# Patient Record
Sex: FEMALE | Race: WHITE | Employment: PART TIME | URBAN - METROPOLITAN AREA
[De-identification: names, ages, dates, MRNs, and addresses within clinical notes are randomized per-mention and may not be internally consistent; named-entity substitution may affect disease eponyms.]

---

## 2017-02-20 ENCOUNTER — OFFICE VISIT (OUTPATIENT)
Dept: ENT CLINIC | Age: 57
End: 2017-02-20

## 2017-02-20 VITALS
SYSTOLIC BLOOD PRESSURE: 134 MMHG | DIASTOLIC BLOOD PRESSURE: 77 MMHG | BODY MASS INDEX: 24.8 KG/M2 | HEART RATE: 140 BPM | WEIGHT: 158 LBS | HEIGHT: 67 IN

## 2017-02-20 DIAGNOSIS — S09.93XA FACIAL TRAUMA, INITIAL ENCOUNTER: Primary | ICD-10-CM

## 2017-02-20 PROCEDURE — 99213 OFFICE O/P EST LOW 20 MIN: CPT | Performed by: OTOLARYNGOLOGY

## 2017-02-21 ENCOUNTER — OFFICE VISIT (OUTPATIENT)
Dept: FAMILY MEDICINE CLINIC | Age: 57
End: 2017-02-21

## 2017-02-21 ENCOUNTER — TELEPHONE (OUTPATIENT)
Dept: FAMILY MEDICINE CLINIC | Age: 57
End: 2017-02-21

## 2017-02-21 VITALS
HEART RATE: 94 BPM | BODY MASS INDEX: 23.23 KG/M2 | RESPIRATION RATE: 13 BRPM | DIASTOLIC BLOOD PRESSURE: 82 MMHG | HEIGHT: 67 IN | WEIGHT: 148 LBS | OXYGEN SATURATION: 96 % | SYSTOLIC BLOOD PRESSURE: 138 MMHG

## 2017-02-21 DIAGNOSIS — S09.93XA FACIAL TRAUMA, INITIAL ENCOUNTER: ICD-10-CM

## 2017-02-21 DIAGNOSIS — F10.10 ETOH ABUSE: ICD-10-CM

## 2017-02-21 DIAGNOSIS — E87.1 HYPONATREMIA: Primary | ICD-10-CM

## 2017-02-21 PROCEDURE — 99204 OFFICE O/P NEW MOD 45 MIN: CPT | Performed by: CLINICAL NURSE SPECIALIST

## 2017-02-21 RX ORDER — LORAZEPAM 1 MG/1
1 TABLET ORAL 2 TIMES DAILY PRN
Qty: 5 TABLET | Refills: 0 | Status: ON HOLD | OUTPATIENT
Start: 2017-02-21 | End: 2017-02-24 | Stop reason: HOSPADM

## 2017-02-21 ASSESSMENT — ENCOUNTER SYMPTOMS
SORE THROAT: 0
NAUSEA: 0
CHEST TIGHTNESS: 0
VOMITING: 0
WHEEZING: 0
ABDOMINAL PAIN: 0
SHORTNESS OF BREATH: 0
CONSTIPATION: 0
COUGH: 1
VISUAL CHANGE: 0
SINUS PAIN: 0

## 2017-02-22 PROBLEM — F10.931 ALCOHOL WITHDRAWAL DELIRIUM (HCC): Status: ACTIVE | Noted: 2017-02-22

## 2017-02-22 PROBLEM — S02.92XA FACIAL BONES, CLOSED FRACTURE (HCC): Status: ACTIVE | Noted: 2017-02-22

## 2017-02-24 ASSESSMENT — ENCOUNTER SYMPTOMS: RHINORRHEA: 1

## 2017-02-28 ENCOUNTER — OFFICE VISIT (OUTPATIENT)
Dept: FAMILY MEDICINE CLINIC | Age: 57
End: 2017-02-28

## 2017-02-28 VITALS
OXYGEN SATURATION: 98 % | SYSTOLIC BLOOD PRESSURE: 158 MMHG | HEIGHT: 67 IN | RESPIRATION RATE: 20 BRPM | WEIGHT: 152 LBS | DIASTOLIC BLOOD PRESSURE: 82 MMHG | BODY MASS INDEX: 23.86 KG/M2 | HEART RATE: 132 BPM | TEMPERATURE: 98.2 F

## 2017-02-28 DIAGNOSIS — F10.10 ETOH ABUSE: ICD-10-CM

## 2017-02-28 DIAGNOSIS — E78.49 OTHER HYPERLIPIDEMIA: ICD-10-CM

## 2017-02-28 DIAGNOSIS — N39.0 URINARY TRACT INFECTION WITH HEMATURIA, SITE UNSPECIFIED: ICD-10-CM

## 2017-02-28 DIAGNOSIS — R31.9 URINARY TRACT INFECTION WITH HEMATURIA, SITE UNSPECIFIED: ICD-10-CM

## 2017-02-28 DIAGNOSIS — M54.12 CERVICAL RADICULOPATHY: ICD-10-CM

## 2017-02-28 DIAGNOSIS — F41.9 ANXIETY: ICD-10-CM

## 2017-02-28 DIAGNOSIS — I10 ESSENTIAL HYPERTENSION: Primary | ICD-10-CM

## 2017-02-28 PROCEDURE — 99214 OFFICE O/P EST MOD 30 MIN: CPT | Performed by: CLINICAL NURSE SPECIALIST

## 2017-02-28 RX ORDER — MELOXICAM 7.5 MG/1
7.5 TABLET ORAL DAILY
Qty: 30 TABLET | Refills: 0 | Status: SHIPPED | OUTPATIENT
Start: 2017-02-28 | End: 2017-04-19 | Stop reason: ALTCHOICE

## 2017-02-28 RX ORDER — ESCITALOPRAM OXALATE 10 MG/1
10 TABLET ORAL DAILY
Qty: 30 TABLET | Refills: 0 | Status: SHIPPED | OUTPATIENT
Start: 2017-02-28 | End: 2017-03-13 | Stop reason: SDUPTHER

## 2017-02-28 ASSESSMENT — ENCOUNTER SYMPTOMS
VOMITING: 0
SHORTNESS OF BREATH: 0
CHEST TIGHTNESS: 0
NAUSEA: 0
COUGH: 0
ABDOMINAL PAIN: 0
DIARRHEA: 0

## 2017-03-06 ENCOUNTER — TELEPHONE (OUTPATIENT)
Dept: FAMILY MEDICINE CLINIC | Age: 57
End: 2017-03-06

## 2017-03-07 ENCOUNTER — TELEPHONE (OUTPATIENT)
Dept: FAMILY MEDICINE CLINIC | Age: 57
End: 2017-03-07

## 2017-03-08 ENCOUNTER — TELEPHONE (OUTPATIENT)
Dept: FAMILY MEDICINE CLINIC | Age: 57
End: 2017-03-08

## 2017-03-13 ENCOUNTER — OFFICE VISIT (OUTPATIENT)
Dept: FAMILY MEDICINE CLINIC | Age: 57
End: 2017-03-13

## 2017-03-13 VITALS
DIASTOLIC BLOOD PRESSURE: 72 MMHG | TEMPERATURE: 97.8 F | OXYGEN SATURATION: 96 % | RESPIRATION RATE: 12 BRPM | HEIGHT: 67 IN | WEIGHT: 149.2 LBS | BODY MASS INDEX: 23.42 KG/M2 | SYSTOLIC BLOOD PRESSURE: 136 MMHG | HEART RATE: 90 BPM

## 2017-03-13 DIAGNOSIS — Z72.0 TOBACCO USE: ICD-10-CM

## 2017-03-13 DIAGNOSIS — I10 ESSENTIAL HYPERTENSION: Primary | ICD-10-CM

## 2017-03-13 DIAGNOSIS — F10.11 HISTORY OF ALCOHOL ABUSE: ICD-10-CM

## 2017-03-13 DIAGNOSIS — J06.9 UPPER RESPIRATORY TRACT INFECTION, UNSPECIFIED TYPE: ICD-10-CM

## 2017-03-13 DIAGNOSIS — Z87.440 HX: UTI (URINARY TRACT INFECTION): ICD-10-CM

## 2017-03-13 DIAGNOSIS — F41.9 ANXIETY: ICD-10-CM

## 2017-03-13 PROBLEM — S02.92XA FACIAL BONES, CLOSED FRACTURE (HCC): Status: RESOLVED | Noted: 2017-02-22 | Resolved: 2017-03-13

## 2017-03-13 PROBLEM — F10.931 ALCOHOL WITHDRAWAL DELIRIUM (HCC): Status: RESOLVED | Noted: 2017-02-22 | Resolved: 2017-03-13

## 2017-03-13 PROBLEM — Z91.89 HISTORY OF DRUG OVERDOSE: Status: ACTIVE | Noted: 2017-03-13

## 2017-03-13 PROCEDURE — 99214 OFFICE O/P EST MOD 30 MIN: CPT | Performed by: FAMILY MEDICINE

## 2017-03-13 RX ORDER — AZITHROMYCIN 250 MG/1
TABLET, FILM COATED ORAL
Qty: 1 PACKET | Refills: 0 | Status: SHIPPED | OUTPATIENT
Start: 2017-03-13 | End: 2017-03-23

## 2017-03-13 RX ORDER — GUAIFENESIN 600 MG/1
600 TABLET, EXTENDED RELEASE ORAL 2 TIMES DAILY
Qty: 20 TABLET | Refills: 0 | Status: SHIPPED | OUTPATIENT
Start: 2017-03-13 | End: 2017-04-19 | Stop reason: ALTCHOICE

## 2017-03-13 RX ORDER — LOSARTAN POTASSIUM 25 MG/1
25 TABLET ORAL DAILY
Qty: 30 TABLET | Refills: 2 | Status: SHIPPED | OUTPATIENT
Start: 2017-03-13 | End: 2017-03-28

## 2017-03-13 RX ORDER — ESCITALOPRAM OXALATE 20 MG/1
20 TABLET ORAL DAILY
Qty: 30 TABLET | Refills: 2 | Status: SHIPPED | OUTPATIENT
Start: 2017-03-13 | End: 2017-05-18 | Stop reason: SDUPTHER

## 2017-03-18 PROBLEM — Z72.0 TOBACCO USE: Status: ACTIVE | Noted: 2017-03-18

## 2017-03-18 ASSESSMENT — ENCOUNTER SYMPTOMS
COUGH: 1
GASTROINTESTINAL NEGATIVE: 1

## 2017-03-28 ENCOUNTER — TELEPHONE (OUTPATIENT)
Dept: FAMILY MEDICINE CLINIC | Age: 57
End: 2017-03-28

## 2017-03-28 DIAGNOSIS — I10 ESSENTIAL HYPERTENSION: Primary | ICD-10-CM

## 2017-04-03 ENCOUNTER — TELEPHONE (OUTPATIENT)
Dept: FAMILY MEDICINE CLINIC | Age: 57
End: 2017-04-03

## 2017-04-19 ENCOUNTER — OFFICE VISIT (OUTPATIENT)
Dept: INTERNAL MEDICINE CLINIC | Age: 57
End: 2017-04-19

## 2017-04-19 VITALS
SYSTOLIC BLOOD PRESSURE: 120 MMHG | HEART RATE: 118 BPM | HEIGHT: 66 IN | BODY MASS INDEX: 23.14 KG/M2 | DIASTOLIC BLOOD PRESSURE: 70 MMHG | WEIGHT: 144 LBS

## 2017-04-19 DIAGNOSIS — G47.00 INSOMNIA, UNSPECIFIED TYPE: ICD-10-CM

## 2017-04-19 DIAGNOSIS — M47.812 DEGENERATIVE JOINT DISEASE OF CERVICAL AND LUMBAR SPINE: ICD-10-CM

## 2017-04-19 DIAGNOSIS — F41.9 ANXIETY: ICD-10-CM

## 2017-04-19 DIAGNOSIS — I10 ESSENTIAL HYPERTENSION: Primary | ICD-10-CM

## 2017-04-19 DIAGNOSIS — D64.9 ANEMIA, UNSPECIFIED TYPE: ICD-10-CM

## 2017-04-19 DIAGNOSIS — J45.30 RAD (REACTIVE AIRWAY DISEASE), MILD PERSISTENT, UNCOMPLICATED: ICD-10-CM

## 2017-04-19 DIAGNOSIS — F10.11 HISTORY OF ALCOHOL ABUSE: ICD-10-CM

## 2017-04-19 DIAGNOSIS — M47.816 DEGENERATIVE JOINT DISEASE OF CERVICAL AND LUMBAR SPINE: ICD-10-CM

## 2017-04-19 PROCEDURE — 99215 OFFICE O/P EST HI 40 MIN: CPT | Performed by: INTERNAL MEDICINE

## 2017-04-19 PROCEDURE — 96160 PT-FOCUSED HLTH RISK ASSMT: CPT | Performed by: INTERNAL MEDICINE

## 2017-04-19 RX ORDER — ALBUTEROL SULFATE 90 UG/1
2 AEROSOL, METERED RESPIRATORY (INHALATION) EVERY 6 HOURS PRN
Qty: 1 INHALER | Refills: 3 | Status: SHIPPED | OUTPATIENT
Start: 2017-04-19 | End: 2017-11-29 | Stop reason: SDUPTHER

## 2017-04-19 RX ORDER — LOSARTAN POTASSIUM 25 MG/1
25 TABLET ORAL DAILY
COMMUNITY
End: 2017-10-16 | Stop reason: SDUPTHER

## 2017-04-19 RX ORDER — BUSPIRONE HYDROCHLORIDE 7.5 MG/1
7.5 TABLET ORAL DAILY PRN
Qty: 30 TABLET | Refills: 0 | Status: SHIPPED | OUTPATIENT
Start: 2017-04-19 | End: 2017-09-11 | Stop reason: SDUPTHER

## 2017-04-19 RX ORDER — IBUPROFEN 200 MG
200 TABLET ORAL EVERY 6 HOURS PRN
COMMUNITY
End: 2017-04-19 | Stop reason: ALTCHOICE

## 2017-04-19 ASSESSMENT — PATIENT HEALTH QUESTIONNAIRE - PHQ9
1. LITTLE INTEREST OR PLEASURE IN DOING THINGS: 1
10. IF YOU CHECKED OFF ANY PROBLEMS, HOW DIFFICULT HAVE THESE PROBLEMS MADE IT FOR YOU TO DO YOUR WORK, TAKE CARE OF THINGS AT HOME, OR GET ALONG WITH OTHER PEOPLE: 2
6. FEELING BAD ABOUT YOURSELF - OR THAT YOU ARE A FAILURE OR HAVE LET YOURSELF OR YOUR FAMILY DOWN: 3
3. TROUBLE FALLING OR STAYING ASLEEP: 3
SUM OF ALL RESPONSES TO PHQ9 QUESTIONS 1 & 2: 1
5. POOR APPETITE OR OVEREATING: 0
4. FEELING TIRED OR HAVING LITTLE ENERGY: 0
8. MOVING OR SPEAKING SO SLOWLY THAT OTHER PEOPLE COULD HAVE NOTICED. OR THE OPPOSITE, BEING SO FIGETY OR RESTLESS THAT YOU HAVE BEEN MOVING AROUND A LOT MORE THAN USUAL: 0
9. THOUGHTS THAT YOU WOULD BE BETTER OFF DEAD, OR OF HURTING YOURSELF: 0
7. TROUBLE CONCENTRATING ON THINGS, SUCH AS READING THE NEWSPAPER OR WATCHING TELEVISION: 0
SUM OF ALL RESPONSES TO PHQ QUESTIONS 1-9: 7

## 2017-04-19 ASSESSMENT — ENCOUNTER SYMPTOMS
WHEEZING: 1
COUGH: 1
VOMITING: 0
ABDOMINAL PAIN: 0
COLOR CHANGE: 0
NAUSEA: 0
RHINORRHEA: 1
CHEST TIGHTNESS: 0
SHORTNESS OF BREATH: 0

## 2017-05-15 ENCOUNTER — OFFICE VISIT (OUTPATIENT)
Dept: PSYCHOLOGY | Age: 57
End: 2017-05-15

## 2017-05-15 DIAGNOSIS — F43.22 ADJUSTMENT DISORDER WITH ANXIOUS MOOD: Primary | ICD-10-CM

## 2017-05-15 DIAGNOSIS — D64.9 ANEMIA, UNSPECIFIED TYPE: ICD-10-CM

## 2017-05-15 DIAGNOSIS — I10 ESSENTIAL HYPERTENSION: ICD-10-CM

## 2017-05-15 LAB
A/G RATIO: 2 (ref 1.1–2.2)
ALBUMIN SERPL-MCNC: 4.7 G/DL (ref 3.4–5)
ALP BLD-CCNC: 135 U/L (ref 40–129)
ALT SERPL-CCNC: 13 U/L (ref 10–40)
ANION GAP SERPL CALCULATED.3IONS-SCNC: 17 MMOL/L (ref 3–16)
AST SERPL-CCNC: 23 U/L (ref 15–37)
BASOPHILS ABSOLUTE: 0.1 K/UL (ref 0–0.2)
BASOPHILS RELATIVE PERCENT: 0.9 %
BILIRUB SERPL-MCNC: <0.2 MG/DL (ref 0–1)
BUN BLDV-MCNC: 9 MG/DL (ref 7–20)
CALCIUM SERPL-MCNC: 9.4 MG/DL (ref 8.3–10.6)
CHLORIDE BLD-SCNC: 95 MMOL/L (ref 99–110)
CO2: 25 MMOL/L (ref 21–32)
CREAT SERPL-MCNC: 0.6 MG/DL (ref 0.6–1.1)
EOSINOPHILS ABSOLUTE: 0.1 K/UL (ref 0–0.6)
EOSINOPHILS RELATIVE PERCENT: 0.8 %
FERRITIN: 43.9 NG/ML (ref 15–150)
FOLATE: >20 NG/ML (ref 4.78–24.2)
GFR AFRICAN AMERICAN: >60
GFR NON-AFRICAN AMERICAN: >60
GLOBULIN: 2.4 G/DL
GLUCOSE BLD-MCNC: 99 MG/DL (ref 70–99)
HCT VFR BLD CALC: 44.4 % (ref 36–48)
HEMOGLOBIN: 14.2 G/DL (ref 12–16)
IRON SATURATION: 18 % (ref 15–50)
IRON: 69 UG/DL (ref 37–145)
LYMPHOCYTES ABSOLUTE: 1.2 K/UL (ref 1–5.1)
LYMPHOCYTES RELATIVE PERCENT: 17.9 %
MCH RBC QN AUTO: 32.6 PG (ref 26–34)
MCHC RBC AUTO-ENTMCNC: 32 G/DL (ref 31–36)
MCV RBC AUTO: 101.8 FL (ref 80–100)
MONOCYTES ABSOLUTE: 0.3 K/UL (ref 0–1.3)
MONOCYTES RELATIVE PERCENT: 4.6 %
NEUTROPHILS ABSOLUTE: 4.9 K/UL (ref 1.7–7.7)
NEUTROPHILS RELATIVE PERCENT: 75.8 %
PDW BLD-RTO: 14 % (ref 12.4–15.4)
PLATELET # BLD: 203 K/UL (ref 135–450)
PMV BLD AUTO: 8.8 FL (ref 5–10.5)
POTASSIUM SERPL-SCNC: 4.7 MMOL/L (ref 3.5–5.1)
RBC # BLD: 4.36 M/UL (ref 4–5.2)
SODIUM BLD-SCNC: 137 MMOL/L (ref 136–145)
TOTAL IRON BINDING CAPACITY: 382 UG/DL (ref 260–445)
TOTAL PROTEIN: 7.1 G/DL (ref 6.4–8.2)
TSH REFLEX: 1.21 UIU/ML (ref 0.27–4.2)
VITAMIN B-12: 292 PG/ML (ref 211–911)
WBC # BLD: 6.5 K/UL (ref 4–11)

## 2017-05-15 PROCEDURE — 90791 PSYCH DIAGNOSTIC EVALUATION: CPT | Performed by: PSYCHOLOGIST

## 2017-05-18 ENCOUNTER — OFFICE VISIT (OUTPATIENT)
Dept: INTERNAL MEDICINE CLINIC | Age: 57
End: 2017-05-18

## 2017-05-18 ENCOUNTER — TELEPHONE (OUTPATIENT)
Dept: INTERNAL MEDICINE CLINIC | Age: 57
End: 2017-05-18

## 2017-05-18 VITALS
DIASTOLIC BLOOD PRESSURE: 82 MMHG | HEIGHT: 66 IN | BODY MASS INDEX: 23.14 KG/M2 | SYSTOLIC BLOOD PRESSURE: 130 MMHG | WEIGHT: 144 LBS | HEART RATE: 102 BPM

## 2017-05-18 DIAGNOSIS — J30.1 SEASONAL ALLERGIC RHINITIS DUE TO POLLEN: ICD-10-CM

## 2017-05-18 DIAGNOSIS — R00.0 SINUS TACHYCARDIA: ICD-10-CM

## 2017-05-18 DIAGNOSIS — K21.9 GASTROESOPHAGEAL REFLUX DISEASE, ESOPHAGITIS PRESENCE NOT SPECIFIED: ICD-10-CM

## 2017-05-18 DIAGNOSIS — I10 ESSENTIAL HYPERTENSION: Primary | ICD-10-CM

## 2017-05-18 DIAGNOSIS — F41.9 ANXIETY: ICD-10-CM

## 2017-05-18 DIAGNOSIS — R19.7 DIARRHEA, UNSPECIFIED TYPE: ICD-10-CM

## 2017-05-18 PROCEDURE — 99214 OFFICE O/P EST MOD 30 MIN: CPT | Performed by: INTERNAL MEDICINE

## 2017-05-18 RX ORDER — RANITIDINE 150 MG/1
150 TABLET ORAL 2 TIMES DAILY PRN
Qty: 180 TABLET | Refills: 1 | Status: SHIPPED | OUTPATIENT
Start: 2017-05-18 | End: 2020-08-20

## 2017-05-18 RX ORDER — RANITIDINE 150 MG/1
150 TABLET ORAL PRN
Qty: 180 TABLET | Refills: 0 | Status: SHIPPED | OUTPATIENT
Start: 2017-05-18 | End: 2017-05-18 | Stop reason: SDUPTHER

## 2017-05-18 RX ORDER — METOPROLOL SUCCINATE 25 MG/1
25 TABLET, EXTENDED RELEASE ORAL DAILY
Qty: 90 TABLET | Refills: 1 | Status: SHIPPED | OUTPATIENT
Start: 2017-05-18 | End: 2017-09-11 | Stop reason: SDUPTHER

## 2017-05-18 RX ORDER — ESCITALOPRAM OXALATE 20 MG/1
20 TABLET ORAL DAILY
Qty: 90 TABLET | Refills: 1 | Status: SHIPPED | OUTPATIENT
Start: 2017-05-18 | End: 2017-09-11 | Stop reason: SDUPTHER

## 2017-05-18 RX ORDER — FLUTICASONE PROPIONATE 50 MCG
1 SPRAY, SUSPENSION (ML) NASAL DAILY
Qty: 1 BOTTLE | Refills: 5 | Status: SHIPPED | OUTPATIENT
Start: 2017-05-18 | End: 2018-06-11 | Stop reason: SDUPTHER

## 2017-05-18 ASSESSMENT — ENCOUNTER SYMPTOMS
SINUS PRESSURE: 1
CONSTIPATION: 0
ABDOMINAL DISTENTION: 0
ABDOMINAL PAIN: 0
ANAL BLEEDING: 0
NAUSEA: 0
WHEEZING: 0
DIARRHEA: 1
BLOOD IN STOOL: 0
CHEST TIGHTNESS: 0
RHINORRHEA: 1
SHORTNESS OF BREATH: 0

## 2017-06-14 ENCOUNTER — OFFICE VISIT (OUTPATIENT)
Dept: PSYCHOLOGY | Age: 57
End: 2017-06-14

## 2017-06-14 DIAGNOSIS — F43.22 ADJUSTMENT DISORDER WITH ANXIOUS MOOD: Primary | ICD-10-CM

## 2017-06-14 DIAGNOSIS — F10.21 ALCOHOL DEPENDENCE IN REMISSION (HCC): ICD-10-CM

## 2017-06-14 PROCEDURE — 90832 PSYTX W PT 30 MINUTES: CPT | Performed by: PSYCHOLOGIST

## 2017-06-16 PROBLEM — F43.22 ADJUSTMENT DISORDER WITH ANXIOUS MOOD: Status: ACTIVE | Noted: 2017-06-16

## 2017-06-16 PROBLEM — F10.21 ALCOHOL DEPENDENCE IN REMISSION (HCC): Status: ACTIVE | Noted: 2017-06-16

## 2017-06-26 ENCOUNTER — OFFICE VISIT (OUTPATIENT)
Dept: INTERNAL MEDICINE CLINIC | Age: 57
End: 2017-06-26

## 2017-06-26 VITALS
HEIGHT: 66 IN | SYSTOLIC BLOOD PRESSURE: 138 MMHG | HEART RATE: 88 BPM | BODY MASS INDEX: 23.95 KG/M2 | DIASTOLIC BLOOD PRESSURE: 84 MMHG | WEIGHT: 149 LBS

## 2017-06-26 DIAGNOSIS — M54.50 ACUTE BILATERAL LOW BACK PAIN WITHOUT SCIATICA: Primary | ICD-10-CM

## 2017-06-26 PROCEDURE — 99213 OFFICE O/P EST LOW 20 MIN: CPT | Performed by: NURSE PRACTITIONER

## 2017-06-26 RX ORDER — NAPROXEN 500 MG/1
500 TABLET ORAL 2 TIMES DAILY WITH MEALS
Qty: 60 TABLET | Refills: 0 | Status: SHIPPED | OUTPATIENT
Start: 2017-06-26 | End: 2017-12-11 | Stop reason: SDUPTHER

## 2017-06-26 RX ORDER — TIZANIDINE 2 MG/1
2 TABLET ORAL EVERY 8 HOURS PRN
Qty: 15 TABLET | Refills: 0 | Status: SHIPPED | OUTPATIENT
Start: 2017-06-26 | End: 2017-09-11 | Stop reason: SDUPTHER

## 2017-06-27 ASSESSMENT — ENCOUNTER SYMPTOMS
BACK PAIN: 1
SHORTNESS OF BREATH: 0

## 2017-07-13 ENCOUNTER — TELEPHONE (OUTPATIENT)
Dept: INTERNAL MEDICINE CLINIC | Age: 57
End: 2017-07-13

## 2017-09-11 ENCOUNTER — OFFICE VISIT (OUTPATIENT)
Dept: INTERNAL MEDICINE CLINIC | Age: 57
End: 2017-09-11

## 2017-09-11 VITALS
HEART RATE: 80 BPM | WEIGHT: 149 LBS | DIASTOLIC BLOOD PRESSURE: 90 MMHG | HEIGHT: 66 IN | BODY MASS INDEX: 23.95 KG/M2 | SYSTOLIC BLOOD PRESSURE: 166 MMHG

## 2017-09-11 DIAGNOSIS — R00.0 SINUS TACHYCARDIA: ICD-10-CM

## 2017-09-11 DIAGNOSIS — F41.9 ANXIETY: ICD-10-CM

## 2017-09-11 DIAGNOSIS — Z72.0 TOBACCO USE: ICD-10-CM

## 2017-09-11 DIAGNOSIS — Z13.31 POSITIVE DEPRESSION SCREENING: ICD-10-CM

## 2017-09-11 DIAGNOSIS — J43.9 PULMONARY EMPHYSEMA, UNSPECIFIED EMPHYSEMA TYPE (HCC): ICD-10-CM

## 2017-09-11 DIAGNOSIS — M62.830 BACK SPASM: ICD-10-CM

## 2017-09-11 DIAGNOSIS — K21.9 GASTROESOPHAGEAL REFLUX DISEASE, ESOPHAGITIS PRESENCE NOT SPECIFIED: ICD-10-CM

## 2017-09-11 DIAGNOSIS — I10 ESSENTIAL HYPERTENSION: Primary | ICD-10-CM

## 2017-09-11 PROCEDURE — 99214 OFFICE O/P EST MOD 30 MIN: CPT | Performed by: INTERNAL MEDICINE

## 2017-09-11 PROCEDURE — G8431 POS CLIN DEPRES SCRN F/U DOC: HCPCS | Performed by: INTERNAL MEDICINE

## 2017-09-11 RX ORDER — TIZANIDINE 2 MG/1
2 TABLET ORAL EVERY 8 HOURS PRN
Qty: 30 TABLET | Refills: 0 | Status: SHIPPED | OUTPATIENT
Start: 2017-09-11 | End: 2017-12-11 | Stop reason: SDUPTHER

## 2017-09-11 RX ORDER — BUSPIRONE HYDROCHLORIDE 7.5 MG/1
7.5 TABLET ORAL 2 TIMES DAILY
Qty: 60 TABLET | Refills: 2 | Status: SHIPPED | OUTPATIENT
Start: 2017-09-11 | End: 2017-12-11 | Stop reason: SDUPTHER

## 2017-09-11 RX ORDER — ESCITALOPRAM OXALATE 20 MG/1
20 TABLET ORAL DAILY
Qty: 90 TABLET | Refills: 1 | Status: SHIPPED | OUTPATIENT
Start: 2017-09-11 | End: 2018-05-04 | Stop reason: SDUPTHER

## 2017-09-11 RX ORDER — METOPROLOL SUCCINATE 25 MG/1
25 TABLET, EXTENDED RELEASE ORAL DAILY
Qty: 90 TABLET | Refills: 1 | Status: SHIPPED | OUTPATIENT
Start: 2017-09-11 | End: 2017-12-19 | Stop reason: SDUPTHER

## 2017-09-11 ASSESSMENT — ENCOUNTER SYMPTOMS
NAUSEA: 0
CHOKING: 0
BACK PAIN: 1
WHEEZING: 0
ABDOMINAL PAIN: 0
CHEST TIGHTNESS: 0
SHORTNESS OF BREATH: 0

## 2017-09-13 ENCOUNTER — NURSE ONLY (OUTPATIENT)
Dept: INTERNAL MEDICINE CLINIC | Age: 57
End: 2017-09-13

## 2017-09-13 ENCOUNTER — OFFICE VISIT (OUTPATIENT)
Dept: PSYCHOLOGY | Age: 57
End: 2017-09-13

## 2017-09-13 DIAGNOSIS — F43.22 ADJUSTMENT DISORDER WITH ANXIOUS MOOD: Primary | ICD-10-CM

## 2017-09-13 DIAGNOSIS — Z23 NEED FOR INFLUENZA VACCINATION: Primary | ICD-10-CM

## 2017-09-13 DIAGNOSIS — F10.21 ALCOHOL DEPENDENCE IN REMISSION (HCC): ICD-10-CM

## 2017-09-13 PROCEDURE — 90832 PSYTX W PT 30 MINUTES: CPT | Performed by: PSYCHOLOGIST

## 2017-09-13 PROCEDURE — 90471 IMMUNIZATION ADMIN: CPT | Performed by: INTERNAL MEDICINE

## 2017-09-13 PROCEDURE — 90686 IIV4 VACC NO PRSV 0.5 ML IM: CPT | Performed by: INTERNAL MEDICINE

## 2017-10-02 ENCOUNTER — TELEPHONE (OUTPATIENT)
Dept: INTERNAL MEDICINE CLINIC | Age: 57
End: 2017-10-02

## 2017-10-02 NOTE — TELEPHONE ENCOUNTER
Pt called to report blood pressure reading to dr Jonas Galarza  on 9/23  140/80   On  9/25 132/72      On  9/28 138/80  And  Today 128/82

## 2017-10-05 ENCOUNTER — OFFICE VISIT (OUTPATIENT)
Dept: PSYCHOLOGY | Age: 57
End: 2017-10-05

## 2017-10-05 DIAGNOSIS — F43.22 ADJUSTMENT DISORDER WITH ANXIOUS MOOD: Primary | ICD-10-CM

## 2017-10-05 DIAGNOSIS — F10.21 ALCOHOL DEPENDENCE IN REMISSION (HCC): ICD-10-CM

## 2017-10-05 PROCEDURE — 90832 PSYTX W PT 30 MINUTES: CPT | Performed by: PSYCHOLOGIST

## 2017-10-05 ASSESSMENT — PATIENT HEALTH QUESTIONNAIRE - PHQ9
5. POOR APPETITE OR OVEREATING: 0
SUM OF ALL RESPONSES TO PHQ QUESTIONS 1-9: 2
8. MOVING OR SPEAKING SO SLOWLY THAT OTHER PEOPLE COULD HAVE NOTICED. OR THE OPPOSITE, BEING SO FIGETY OR RESTLESS THAT YOU HAVE BEEN MOVING AROUND A LOT MORE THAN USUAL: 0
SUM OF ALL RESPONSES TO PHQ9 QUESTIONS 1 & 2: 1
3. TROUBLE FALLING OR STAYING ASLEEP: 0
7. TROUBLE CONCENTRATING ON THINGS, SUCH AS READING THE NEWSPAPER OR WATCHING TELEVISION: 0
1. LITTLE INTEREST OR PLEASURE IN DOING THINGS: 0
2. FEELING DOWN, DEPRESSED OR HOPELESS: 1
4. FEELING TIRED OR HAVING LITTLE ENERGY: 0
10. IF YOU CHECKED OFF ANY PROBLEMS, HOW DIFFICULT HAVE THESE PROBLEMS MADE IT FOR YOU TO DO YOUR WORK, TAKE CARE OF THINGS AT HOME, OR GET ALONG WITH OTHER PEOPLE: 0
6. FEELING BAD ABOUT YOURSELF - OR THAT YOU ARE A FAILURE OR HAVE LET YOURSELF OR YOUR FAMILY DOWN: 1
9. THOUGHTS THAT YOU WOULD BE BETTER OFF DEAD, OR OF HURTING YOURSELF: 0

## 2017-10-05 NOTE — MR AVS SNAPSHOT
After Visit Summary             Heena Jimenez   10/5/2017 3:00 PM   Office Visit    Description:  Female : 1960   Provider:  Sultana Bolivar, PhD   Department:  [facility]              Your Follow-Up and Future Appointments         Below is a list of your follow-up and future appointments. This may not be a complete list as you may have made appointments directly with providers that we are not aware of or your providers may have made some for you. Please call your providers to confirm appointments. It is important to keep your appointments. Please bring your current insurance card, photo ID, co-pay, and all medication bottles to your appointment. If self-pay, payment is expected at the time of service. Your To-Do List     Future Appointments Provider Department Dept Phone    2017 10:30 AM RYLAND Damon MD [facility] [phone]    Please arrive 15 minutes prior to appointment, bring photo ID and insurance card. Follow-Up    Return in about 4 weeks (around 2017) for follow-up. Information from Your Visit        Department     Name Address Phone Fax    [facility] [address] [place] [address] Dr Lovett S [address] [phone]      Vital Signs     Smoking Status                   Current Some Day Smoker           Instructions    1. Research your housing option and create a tentative budget. 2. Move journaling up to a few hours before you want to go to bed. 3. Follow-up with Dr. Sergio Pope in 4 weeks, or sooner, if necessary.                Medications and Orders      Your Current Medications Are              tiZANidine (ZANAFLEX) 2 MG tablet Take 1 tablet by mouth every 8 hours as needed (back pain)    busPIRone (BUSPAR) 7.5 MG tablet Take 1 tablet by mouth 2 times daily    escitalopram (LEXAPRO) 20 MG tablet Take 1 tablet by mouth daily aged 15-65 years at least once (lifetime) who have never been HIV tested. 11/25/1974    Tetanus Combination Vaccine (1 - Tdap) 11/25/1978    Mammograms are recommended every 2 years for low/average risk patients aged 48 - 69, and every year for high risk patients per updated national guidelines. However these guidelines can be individualized by your provider. 5/21/2017    Colon Cancer Stool Test 2/22/2018    Pap Smear 7/27/2018    Cholesterol Screening 8/14/2020            MyChart Signup           [employer] allows you to send messages to your doctor, view your test results, renew your prescriptions, schedule appointments, view visit notes, and more. How Do I Sign Up? 1. In your Internet browser, go to [email or web address]  2. Click on the Sign Up Now link in the Sign In box. You will see the New Member Sign Up page. 3. Enter your [employer] Access Code exactly as it appears below. You will not need to use this code after youve completed the sign-up process. If you do not sign up before the expiration date, you must request a new code. [employer] Access Code: [email or web address]  Expires: 11/10/2017 11:18 AM    4. Enter your Social Security Number ([id number]) and Date of Birth (mm/dd/yyyy) as indicated and click Submit. You will be taken to the next sign-up page. 5. Create a [employer] ID. This will be your [employer] login ID and cannot be changed, so think of one that is secure and easy to remember. 6. Create a [employer] password. You can change your password at any time. 7. Enter your Password Reset Question and Answer. This can be used at a later time if you forget your password. 8. Enter your e-mail address. You will receive e-mail notification when new information is available in [address] 9. Click Sign Up. You can now view your medical record.      Additional Information  If you have questions, please contact the physician practice where you

## 2017-10-05 NOTE — PROGRESS NOTES
Behavioral Health Consultation  Lance Soto, Ph.D.  Psychologist  10/5/2017  3:17 PM      Time spent with Patient: 25 minutes  This is patient's fourth  [facility] appointment. Reason for Consult:    Chief Complaint   Patient presents with    Anxiety     Referring Provider: Lisa Monroe MD  [address] [email or web address], [address]    Feedback given to PCP. S:  Pt seen for f/u of anxiety. Reported generally stable mood and symptoms, slightly improved relationship with son and DIL. Feeling strong desire to move into her own place, whenever she discusses sister, sister convinces her that she is not yet ready. Explored this and patient acknowledges her sister will likely never view her as ready, patient plans to minimize her anxiety by learning about her options and creating a tentative budget. Patient's journaling continues to be helpful over all but often increases her agitation and results in poor sleep. Encouraged moving journaling time to earlier in the evening and creating relaxing bedtime routine.   O:  MSE:    Appearance    alert, cooperative  Appetite normal  Sleep disturbance Yes  Fatigue Yes  Loss of pleasure No  Impulsive behavior No  Speech    spontaneous, normal rate, normal volume and well articulated  Mood    Anxious  Affect    anxiety  Thought Content    intact and cognitive distortions  Thought Process    goal directed and coherent  Associations    logical connections  Insight    Fair  Judgment    Intact  Orientation    oriented to person, place, time, and general circumstances  Memory    recent and remote memory intact  Attention/Concentration    impaired  Morbid ideation No  Suicide Assessment    no suicidal ideation    History:    Medications:   Current Outpatient Prescriptions   Medication Sig Dispense Refill    tiZANidine (ZANAFLEX) 2 MG tablet Take 1 tablet by mouth every 8 hours as needed (back pain) 30 tablet 0    busPIRone (BUSPAR) 7.5 MG tablet Take 1 tablet by mouth 2 times daily 60 0.09 packs per day. She has never used smokeless tobacco.  ETOH:   reports that she does not drink alcohol. Family History:   Family History   Problem Relation Age of Onset    Kidney Disease Mother     Rheum Arthritis Mother    Kosta Gibson Migraines Mother     Heart Disease Father     Hypertension Father     Cancer Father      lung/liver    Asthma Father      A:  Administered PHQ-9 (see below). Patient endorses minimal symptoms of depression. Denied SI/HI.      PHQ Scores 10/5/2017 4/19/2017   PHQ2 Score 1 1   PHQ9 Score 2 7     Interpretation of Total Score Depression Severity: 1-4 = Minimal depression, 5-9 = Mild depression, 10-14 = Moderate depression, 15-19 = Moderately severe depression, 20-27 = Severe depression      Diagnosis:  Adjustment disorder with anxiety  Alcohol Use Disorder, in early remission      Diagnosis Date    Alcohol problem drinking     Alcohol withdrawal delirium ([facility] [address] 2/22/2017    Anxiety     Facial bones, closed fracture ([facility] [address] 2/22/2017    Hypertension     Hyponatremia     Malignant hypertension 3/17/2016    Panic attacks      Problems with primary support group, Problems related to the social environment, Occupational problems, Housing problems and Economic problems     Plan:  Pt interventions:  Trained in strategies for increasing balanced thinking, CBT to target anxiety and Collaboratively set goals with pt re: self-care

## 2017-10-05 NOTE — PATIENT INSTRUCTIONS
1. Research your housing option and create a tentative budget. 2. Move journaling up to a few hours before you want to go to bed. 3. Follow-up with Dr. Pb Reynoso in 4 weeks, or sooner, if necessary.

## 2017-10-16 RX ORDER — LOSARTAN POTASSIUM 25 MG/1
25 TABLET ORAL DAILY
Qty: 30 TABLET | Refills: 5 | Status: SHIPPED | OUTPATIENT
Start: 2017-10-16 | End: 2018-04-15 | Stop reason: SDUPTHER

## 2017-11-02 ENCOUNTER — OFFICE VISIT (OUTPATIENT)
Dept: PSYCHOLOGY | Age: 57
End: 2017-11-02

## 2017-11-02 DIAGNOSIS — F43.22 ADJUSTMENT DISORDER WITH ANXIOUS MOOD: Primary | ICD-10-CM

## 2017-11-02 DIAGNOSIS — F10.11 ALCOHOL USE DISORDER, MILD, IN EARLY REMISSION: ICD-10-CM

## 2017-11-02 PROCEDURE — 90832 PSYTX W PT 30 MINUTES: CPT | Performed by: PSYCHOLOGIST

## 2017-11-02 ASSESSMENT — PATIENT HEALTH QUESTIONNAIRE - PHQ9
7. TROUBLE CONCENTRATING ON THINGS, SUCH AS READING THE NEWSPAPER OR WATCHING TELEVISION: 0
10. IF YOU CHECKED OFF ANY PROBLEMS, HOW DIFFICULT HAVE THESE PROBLEMS MADE IT FOR YOU TO DO YOUR WORK, TAKE CARE OF THINGS AT HOME, OR GET ALONG WITH OTHER PEOPLE: 0
2. FEELING DOWN, DEPRESSED OR HOPELESS: 1
9. THOUGHTS THAT YOU WOULD BE BETTER OFF DEAD, OR OF HURTING YOURSELF: 0
1. LITTLE INTEREST OR PLEASURE IN DOING THINGS: 0
SUM OF ALL RESPONSES TO PHQ9 QUESTIONS 1 & 2: 1
SUM OF ALL RESPONSES TO PHQ QUESTIONS 1-9: 4
8. MOVING OR SPEAKING SO SLOWLY THAT OTHER PEOPLE COULD HAVE NOTICED. OR THE OPPOSITE, BEING SO FIGETY OR RESTLESS THAT YOU HAVE BEEN MOVING AROUND A LOT MORE THAN USUAL: 0
3. TROUBLE FALLING OR STAYING ASLEEP: 1
5. POOR APPETITE OR OVEREATING: 0
4. FEELING TIRED OR HAVING LITTLE ENERGY: 1
6. FEELING BAD ABOUT YOURSELF - OR THAT YOU ARE A FAILURE OR HAVE LET YOURSELF OR YOUR FAMILY DOWN: 1

## 2017-11-02 NOTE — PROGRESS NOTES
Behavioral Health Consultation  Alonzo Wild, Ph.D.  Psychologist  11/2/2017  1:06 PM      Time spent with Patient: 32 minutes  This is patient's fifth  [facility] appointment. Reason for Consult:    Chief Complaint   Patient presents with    Anxiety     Referring Provider: Errol Ingram MD  [address] [employer], [address]    Feedback given to PCP. S:  Pt seen for f/u of anxiety. Pt reported generally stable mood and sxs. Frustrated by process of determining housing costs via [employer]. Would benefit from case management, but unsure how to secure. Son is  from wife, children and wife have moved out of the house to live w DIL's sister, whom pt respects and trusts to provide a secure environment. Son will be moving out soon, pt is relieved.      O:  MSE:    Appearance    alert, cooperative  Appetite normal  Sleep disturbance Yes  Fatigue Yes  Loss of pleasure No  Impulsive behavior No  Speech    spontaneous, normal rate, normal volume and well articulated  Mood    Anxious  Affect    anxiety  Thought Content    intact and cognitive distortions  Thought Process    linear, goal directed and coherent  Associations    logical connections  Insight    Fair  Judgment    Intact  Orientation    oriented to person, place, time, and general circumstances  Memory    recent and remote memory intact  Attention/Concentration    impaired  Morbid ideation No  Suicide Assessment    no suicidal ideation    History:    Medications:   Current Outpatient Prescriptions   Medication Sig Dispense Refill    losartan (COZAAR) 25 MG tablet Take 1 tablet by mouth daily 30 tablet 5    tiZANidine (ZANAFLEX) 2 MG tablet Take 1 tablet by mouth every 8 hours as needed (back pain) 30 tablet 0    busPIRone (BUSPAR) 7.5 MG tablet Take 1 tablet by mouth 2 times daily 60 tablet 2    escitalopram (LEXAPRO) 20 MG tablet Take 1 tablet by mouth daily 90 tablet 1    metoprolol succinate (TOPROL XL) 25 MG extended release tablet Take 1

## 2017-11-07 PROBLEM — F10.11 ALCOHOL USE DISORDER, MILD, IN EARLY REMISSION: Status: ACTIVE | Noted: 2017-11-07

## 2017-11-29 ENCOUNTER — OFFICE VISIT (OUTPATIENT)
Dept: INTERNAL MEDICINE CLINIC | Age: 57
End: 2017-11-29

## 2017-11-29 ENCOUNTER — HOSPITAL ENCOUNTER (OUTPATIENT)
Dept: OTHER | Age: 57
Discharge: HOME | End: 2017-11-29
Attending: INTERNAL MEDICINE | Admitting: INTERNAL MEDICINE

## 2017-11-29 VITALS
HEART RATE: 72 BPM | TEMPERATURE: 97.8 F | WEIGHT: 149 LBS | BODY MASS INDEX: 24.05 KG/M2 | SYSTOLIC BLOOD PRESSURE: 132 MMHG | DIASTOLIC BLOOD PRESSURE: 78 MMHG

## 2017-11-29 DIAGNOSIS — J20.9 ACUTE BRONCHITIS, UNSPECIFIED ORGANISM: ICD-10-CM

## 2017-11-29 DIAGNOSIS — J45.30 RAD (REACTIVE AIRWAY DISEASE), MILD PERSISTENT, UNCOMPLICATED: ICD-10-CM

## 2017-11-29 DIAGNOSIS — R68.89 FLU-LIKE SYMPTOMS: Primary | ICD-10-CM

## 2017-11-29 PROCEDURE — 3017F COLORECTAL CA SCREEN DOC REV: CPT | Performed by: INTERNAL MEDICINE

## 2017-11-29 PROCEDURE — 99214 OFFICE O/P EST MOD 30 MIN: CPT | Performed by: INTERNAL MEDICINE

## 2017-11-29 PROCEDURE — 87804 INFLUENZA ASSAY W/OPTIC: CPT | Performed by: INTERNAL MEDICINE

## 2017-11-29 PROCEDURE — G8484 FLU IMMUNIZE NO ADMIN: HCPCS | Performed by: INTERNAL MEDICINE

## 2017-11-29 PROCEDURE — 3014F SCREEN MAMMO DOC REV: CPT | Performed by: INTERNAL MEDICINE

## 2017-11-29 PROCEDURE — 1036F TOBACCO NON-USER: CPT | Performed by: INTERNAL MEDICINE

## 2017-11-29 PROCEDURE — G8420 CALC BMI NORM PARAMETERS: HCPCS | Performed by: INTERNAL MEDICINE

## 2017-11-29 PROCEDURE — G8427 DOCREV CUR MEDS BY ELIG CLIN: HCPCS | Performed by: INTERNAL MEDICINE

## 2017-11-29 RX ORDER — AMOXICILLIN AND CLAVULANATE POTASSIUM 875; 125 MG/1; MG/1
1 TABLET, FILM COATED ORAL 2 TIMES DAILY
Qty: 20 TABLET | Refills: 0 | Status: SHIPPED | OUTPATIENT
Start: 2017-11-29 | End: 2017-12-09

## 2017-11-29 RX ORDER — ALBUTEROL SULFATE 90 UG/1
2 AEROSOL, METERED RESPIRATORY (INHALATION) EVERY 6 HOURS PRN
Qty: 1 INHALER | Refills: 3 | Status: SHIPPED | OUTPATIENT
Start: 2017-11-29 | End: 2018-11-29 | Stop reason: SDUPTHER

## 2017-11-29 RX ORDER — METHYLPREDNISOLONE 4 MG/1
TABLET ORAL
Qty: 1 KIT | Refills: 0 | Status: SHIPPED | OUTPATIENT
Start: 2017-11-29 | End: 2017-12-05

## 2017-11-29 ASSESSMENT — ENCOUNTER SYMPTOMS
COUGH: 1
SORE THROAT: 1
ABDOMINAL PAIN: 0
WHEEZING: 1
NAUSEA: 0

## 2017-11-29 NOTE — PROGRESS NOTES
Subjective:      Chief Complaint   Patient presents with    Cough     x 5 days productive, 101 the other day    Other     otc meds not helping. (proair inhaler defective)-now needs ventolin       Patient ID: Lizette Billy is a 61 y.o. female. Cough   This is a new problem. Episode onset: last 3 days. The cough is productive of sputum. Associated symptoms include chills, a fever, myalgias, a sore throat and wheezing. Pertinent negatives include no headaches. Nothing aggravates the symptoms. Treatments tried: ran out of albuterol. The treatment provided no relief. Her past medical history is significant for COPD. Other   Associated symptoms include chills, coughing, a fever, myalgias and a sore throat. Pertinent negatives include no abdominal pain, headaches or nausea. Review of Systems   Constitutional: Positive for chills and fever. HENT: Positive for sore throat. Respiratory: Positive for cough and wheezing. Gastrointestinal: Negative for abdominal pain and nausea. Vomited once  2 days ago   Genitourinary: Negative for enuresis, flank pain and frequency. Musculoskeletal: Positive for myalgias. Neurological: Negative for dizziness, light-headedness and headaches. Allergies   Allergen Reactions    Epinephrine     Novocain [Procaine]     Procaine Hcl      Vitals:    11/29/17 1132 11/29/17 1138   BP: (!) 158/86 132/78   Site: Right Arm Right Arm   Position: Sitting Sitting   Cuff Size: Medium Adult Medium Adult   Pulse: 72    Temp: 97.8 °F (36.6 °C)    Weight: 149 lb (67.6 kg)          Objective:   Physical Exam   Constitutional: She is oriented to person, place, and time. She appears well-developed and well-nourished. Eyes: Conjunctivae and EOM are normal.   Neck: Normal range of motion. Neck supple. Cardiovascular: Normal rate and normal heart sounds. Pulmonary/Chest:   Bilateral scattered rhonchi in both lungs field   Abdominal: Soft.  Bowel sounds are normal. She

## 2017-12-11 ENCOUNTER — OFFICE VISIT (OUTPATIENT)
Dept: INTERNAL MEDICINE CLINIC | Age: 57
End: 2017-12-11

## 2017-12-11 VITALS
HEIGHT: 66 IN | HEART RATE: 72 BPM | DIASTOLIC BLOOD PRESSURE: 84 MMHG | SYSTOLIC BLOOD PRESSURE: 132 MMHG | WEIGHT: 150 LBS | BODY MASS INDEX: 24.11 KG/M2

## 2017-12-11 DIAGNOSIS — M62.830 BACK SPASM: ICD-10-CM

## 2017-12-11 DIAGNOSIS — M62.838 NECK MUSCLE SPASM: ICD-10-CM

## 2017-12-11 DIAGNOSIS — I10 ESSENTIAL HYPERTENSION: Primary | ICD-10-CM

## 2017-12-11 DIAGNOSIS — M54.50 ACUTE BILATERAL LOW BACK PAIN WITHOUT SCIATICA: ICD-10-CM

## 2017-12-11 DIAGNOSIS — F41.9 ANXIETY: ICD-10-CM

## 2017-12-11 DIAGNOSIS — L98.9 SKIN LESION: ICD-10-CM

## 2017-12-11 PROCEDURE — 99214 OFFICE O/P EST MOD 30 MIN: CPT | Performed by: INTERNAL MEDICINE

## 2017-12-11 PROCEDURE — G8420 CALC BMI NORM PARAMETERS: HCPCS | Performed by: INTERNAL MEDICINE

## 2017-12-11 PROCEDURE — 3017F COLORECTAL CA SCREEN DOC REV: CPT | Performed by: INTERNAL MEDICINE

## 2017-12-11 PROCEDURE — G8427 DOCREV CUR MEDS BY ELIG CLIN: HCPCS | Performed by: INTERNAL MEDICINE

## 2017-12-11 PROCEDURE — 1036F TOBACCO NON-USER: CPT | Performed by: INTERNAL MEDICINE

## 2017-12-11 PROCEDURE — 3014F SCREEN MAMMO DOC REV: CPT | Performed by: INTERNAL MEDICINE

## 2017-12-11 PROCEDURE — G8484 FLU IMMUNIZE NO ADMIN: HCPCS | Performed by: INTERNAL MEDICINE

## 2017-12-11 RX ORDER — NAPROXEN 500 MG/1
500 TABLET ORAL 2 TIMES DAILY WITH MEALS
Qty: 60 TABLET | Refills: 5 | Status: SHIPPED | OUTPATIENT
Start: 2017-12-11 | End: 2019-03-12 | Stop reason: SDUPTHER

## 2017-12-11 RX ORDER — TIZANIDINE 2 MG/1
2 TABLET ORAL EVERY 8 HOURS PRN
Qty: 30 TABLET | Refills: 5 | Status: SHIPPED | OUTPATIENT
Start: 2017-12-11 | End: 2018-06-11 | Stop reason: SDUPTHER

## 2017-12-11 RX ORDER — BUSPIRONE HYDROCHLORIDE 7.5 MG/1
7.5 TABLET ORAL 2 TIMES DAILY
Qty: 60 TABLET | Refills: 5 | Status: SHIPPED | OUTPATIENT
Start: 2017-12-11 | End: 2018-06-11 | Stop reason: SDUPTHER

## 2017-12-11 ASSESSMENT — ENCOUNTER SYMPTOMS
COUGH: 0
NAUSEA: 0
CHEST TIGHTNESS: 0
VOMITING: 0
ABDOMINAL PAIN: 0
WHEEZING: 0
SHORTNESS OF BREATH: 0

## 2017-12-11 NOTE — PROGRESS NOTES
mood and affect. Thought content normal.   Nursing note and vitals reviewed. Assessment/Plan:  Alfredo Umanzor was seen today for 3 month follow-up, hypertension and other. Diagnoses and all orders for this visit:    Essential hypertension  We'll continue current losartan and metoprolol  Anxiety  -     busPIRone (BUSPAR) 7.5 MG tablet; Take 1 tablet by mouth 2 times daily  We'll continue current . Lexapro  Acute bilateral low back pain without sciatica  As needed-     naproxen (NAPROSYN) 500 MG tablet; Take 1 tablet by mouth 2 times daily (with meals)    Back spasm  As needed-     tiZANidine (ZANAFLEX) 2 MG tablet; Take 1 tablet by mouth every 8 hours as needed (back pain)    Skin lesion  -     fluocinonide (LIDEX) 0.05 % cream; Apply topically 2 times daily. Patient will call and let me know in a month if skin lesion does not resolve completely  Neck muscle spasm  As needed Zanaflex   Documentation was done using voice recognition dragon software. Every effort was made to ensure accuracy; however, inadvertent  Unintentional computerized transcription errors may be present. An After Visit Summary was printed and given to the patient. \  F/u in 3 months

## 2017-12-13 ENCOUNTER — OFFICE VISIT (OUTPATIENT)
Dept: PSYCHOLOGY | Age: 57
End: 2017-12-13

## 2017-12-13 DIAGNOSIS — F10.21 ALCOHOL DEPENDENCE IN REMISSION (HCC): Primary | ICD-10-CM

## 2017-12-13 DIAGNOSIS — F43.22 ADJUSTMENT DISORDER WITH ANXIOUS MOOD: ICD-10-CM

## 2017-12-13 PROCEDURE — 90832 PSYTX W PT 30 MINUTES: CPT | Performed by: PSYCHOLOGIST

## 2017-12-13 ASSESSMENT — PATIENT HEALTH QUESTIONNAIRE - PHQ9
10. IF YOU CHECKED OFF ANY PROBLEMS, HOW DIFFICULT HAVE THESE PROBLEMS MADE IT FOR YOU TO DO YOUR WORK, TAKE CARE OF THINGS AT HOME, OR GET ALONG WITH OTHER PEOPLE: 0
1. LITTLE INTEREST OR PLEASURE IN DOING THINGS: 0
7. TROUBLE CONCENTRATING ON THINGS, SUCH AS READING THE NEWSPAPER OR WATCHING TELEVISION: 0
9. THOUGHTS THAT YOU WOULD BE BETTER OFF DEAD, OR OF HURTING YOURSELF: 0
3. TROUBLE FALLING OR STAYING ASLEEP: 1
8. MOVING OR SPEAKING SO SLOWLY THAT OTHER PEOPLE COULD HAVE NOTICED. OR THE OPPOSITE, BEING SO FIGETY OR RESTLESS THAT YOU HAVE BEEN MOVING AROUND A LOT MORE THAN USUAL: 0
2. FEELING DOWN, DEPRESSED OR HOPELESS: 1
SUM OF ALL RESPONSES TO PHQ9 QUESTIONS 1 & 2: 1
4. FEELING TIRED OR HAVING LITTLE ENERGY: 1
5. POOR APPETITE OR OVEREATING: 0
SUM OF ALL RESPONSES TO PHQ QUESTIONS 1-9: 4
6. FEELING BAD ABOUT YOURSELF - OR THAT YOU ARE A FAILURE OR HAVE LET YOURSELF OR YOUR FAMILY DOWN: 1

## 2017-12-13 NOTE — PROGRESS NOTES
Behavioral Health Consultation  Queta Stacy, Ph.D.  Psychologist  12/13/2017  3:02 PM      Time spent with Patient: 30 minutes  This is patient's sixth  [facility] appointment. Reason for Consult:    Chief Complaint   Patient presents with    Anxiety     Referring Provider: Ana Pendleton MD  [address], [address]    Feedback given to PCP. S:  Patient seen for follow-up of anxiety and alcohol use disorder. Reported mood and symptoms are generally stable. Did have one instance of creating a beer while in a restaurant that serves alcohol, but decided not engage in this because she has done so well for the last year. \"I cannot believe that I've come this far. \" Continued anxiety about her son's situation: couch homeless, in chaotic relationship. Continues to have high economic distress, beginning to believe that perhaps she cannot move out of her sister's home. Stressed about living situation, doesn't think she can move out anymore.      O:  MSE:    Appearance    alert, cooperative  Appetite normal  Sleep disturbance Yes  Fatigue Yes  Loss of pleasure No  Impulsive behavior No  Speech    spontaneous, normal rate, normal volume and well articulated  Mood    Anxious  Affect    anxiety  Thought Content    intact  Thought Process    linear, goal directed and coherent  Associations    logical connections  Insight    Good  Judgment    Intact  Orientation    oriented to person, place, time, and general circumstances  Memory    recent and remote memory intact  Attention/Concentration    impaired  Morbid ideation No  Suicide Assessment    no suicidal ideation    History:    Medications:   Current Outpatient Prescriptions   Medication Sig Dispense Refill    busPIRone (BUSPAR) 7.5 MG tablet Take 1 tablet by mouth 2 times daily 60 tablet 5    naproxen (NAPROSYN) 500 MG tablet Take 1 tablet by mouth 2 times daily (with meals) 60 tablet 5    tiZANidine (ZANAFLEX) 2 MG tablet Take 1 tablet by mouth every 8

## 2017-12-19 DIAGNOSIS — I10 ESSENTIAL HYPERTENSION: ICD-10-CM

## 2017-12-19 RX ORDER — METOPROLOL SUCCINATE 25 MG/1
TABLET, EXTENDED RELEASE ORAL
Qty: 90 TABLET | Refills: 0 | Status: SHIPPED | OUTPATIENT
Start: 2017-12-19 | End: 2018-04-03 | Stop reason: SDUPTHER

## 2018-01-15 ENCOUNTER — OFFICE VISIT (OUTPATIENT)
Dept: PSYCHOLOGY | Age: 58
End: 2018-01-15

## 2018-01-15 DIAGNOSIS — F43.22 ADJUSTMENT DISORDER WITH ANXIOUS MOOD: Primary | ICD-10-CM

## 2018-01-15 DIAGNOSIS — F10.21 ALCOHOL DEPENDENCE IN REMISSION (HCC): ICD-10-CM

## 2018-01-15 PROCEDURE — 90832 PSYTX W PT 30 MINUTES: CPT | Performed by: PSYCHOLOGIST

## 2018-01-15 ASSESSMENT — PATIENT HEALTH QUESTIONNAIRE - PHQ9
3. TROUBLE FALLING OR STAYING ASLEEP: 0
4. FEELING TIRED OR HAVING LITTLE ENERGY: 0
SUM OF ALL RESPONSES TO PHQ9 QUESTIONS 1 & 2: 1
8. MOVING OR SPEAKING SO SLOWLY THAT OTHER PEOPLE COULD HAVE NOTICED. OR THE OPPOSITE, BEING SO FIGETY OR RESTLESS THAT YOU HAVE BEEN MOVING AROUND A LOT MORE THAN USUAL: 0
1. LITTLE INTEREST OR PLEASURE IN DOING THINGS: 0
6. FEELING BAD ABOUT YOURSELF - OR THAT YOU ARE A FAILURE OR HAVE LET YOURSELF OR YOUR FAMILY DOWN: 1
9. THOUGHTS THAT YOU WOULD BE BETTER OFF DEAD, OR OF HURTING YOURSELF: 0
10. IF YOU CHECKED OFF ANY PROBLEMS, HOW DIFFICULT HAVE THESE PROBLEMS MADE IT FOR YOU TO DO YOUR WORK, TAKE CARE OF THINGS AT HOME, OR GET ALONG WITH OTHER PEOPLE: 0
7. TROUBLE CONCENTRATING ON THINGS, SUCH AS READING THE NEWSPAPER OR WATCHING TELEVISION: 0
2. FEELING DOWN, DEPRESSED OR HOPELESS: 1
SUM OF ALL RESPONSES TO PHQ QUESTIONS 1-9: 2
5. POOR APPETITE OR OVEREATING: 0
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
## 2018-02-19 ENCOUNTER — OFFICE VISIT (OUTPATIENT)
Dept: PSYCHOLOGY | Age: 58
End: 2018-02-19

## 2018-02-19 DIAGNOSIS — F43.22 ADJUSTMENT DISORDER WITH ANXIOUS MOOD: ICD-10-CM

## 2018-02-19 DIAGNOSIS — F10.11 ALCOHOL USE DISORDER, MILD, IN EARLY REMISSION: Primary | ICD-10-CM

## 2018-02-19 PROCEDURE — 90832 PSYTX W PT 30 MINUTES: CPT | Performed by: PSYCHOLOGIST

## 2018-02-19 ASSESSMENT — PATIENT HEALTH QUESTIONNAIRE - PHQ9
SUM OF ALL RESPONSES TO PHQ QUESTIONS 1-9: 4
2. FEELING DOWN, DEPRESSED OR HOPELESS: 1
SUM OF ALL RESPONSES TO PHQ9 QUESTIONS 1 & 2: 1
9. THOUGHTS THAT YOU WOULD BE BETTER OFF DEAD, OR OF HURTING YOURSELF: 0
5. POOR APPETITE OR OVEREATING: 0
6. FEELING BAD ABOUT YOURSELF - OR THAT YOU ARE A FAILURE OR HAVE LET YOURSELF OR YOUR FAMILY DOWN: 1
7. TROUBLE CONCENTRATING ON THINGS, SUCH AS READING THE NEWSPAPER OR WATCHING TELEVISION: 0
4. FEELING TIRED OR HAVING LITTLE ENERGY: 1
3. TROUBLE FALLING OR STAYING ASLEEP: 1
10. IF YOU CHECKED OFF ANY PROBLEMS, HOW DIFFICULT HAVE THESE PROBLEMS MADE IT FOR YOU TO DO YOUR WORK, TAKE CARE OF THINGS AT HOME, OR GET ALONG WITH OTHER PEOPLE: 0
1. LITTLE INTEREST OR PLEASURE IN DOING THINGS: 0
8. MOVING OR SPEAKING SO SLOWLY THAT OTHER PEOPLE COULD HAVE NOTICED. OR THE OPPOSITE, BEING SO FIGETY OR RESTLESS THAT YOU HAVE BEEN MOVING AROUND A LOT MORE THAN USUAL: 0

## 2018-02-19 NOTE — PATIENT INSTRUCTIONS
Assertive Communication     Assertiveness Is Simple but Hard    NonAssertive   Assertive   Aggressive     (Passive)            (Tactful)             (Rude)           H onest         X  H onest          X  H onest             X A ppropriate        X  A ppropriate                 A ppropriate             X R espectful        X  R espectful             R espectful     D irect                   X  D irect        X  D irect      Assertiveness involves respecting your rights and the rights of others. Important Facts About Assertiveness      Use I or me statements such as When you do ______, I feel _____.     Voice tone, eye contact, and body posture are important parts of assertive communication.  Use a steady and calm voice, stand or sit up straight, look the other person in the eyes without glaring.  Feelings are usually only one word (e.g. angry, anxious, happy, sad, hurt, frustrated, joyful)    Remember, assertiveness doesnt guarantee that you will get what you want or that the other person will understand your concerns or be happy with what you said. It does improve the chances that the other person will understand what you want or how you feel and thus improve your chances of communicating effectively. Four Essential Steps to Assertive Communication   1. Tell the person what you think about their behavior without accusing them. 2. Tell them how you feel when they behave a certain way. 3. Tell them how their behavior affects you and your relationship with them. 4. Tell them what you would prefer them to do instead. XYZ* Formula for Effective Communication   The goal of the XYZ* formula is to express the way you feel (internal world) in response to others behavior (external world) in specific situations. You are the only person who has access to your feelings. Others have no access to your internal world. The only way they will know what you are feeling is if you tell them.  Similarly, you only have access to other peoples external world. It is very easy to make a mistake when trying to guess what others are feeling or intending. I feel X  when you do Y  in situation Z  and I would like *    I feel angry  when you leave your socks and underwear on the bedroom floor  after work  and I would like you to put them in the hamper. I felt insignificant  when you left me with an empty gas tank  yesterday  and I would like you to leave the car with at least 1/4 tank of gas. I feel angry  when you dont call me  if you are staying late at work  and I would like you to call as soon as you know you will be late. I feel loved  when you kiss me  when you get home  and I would like you to do that everyday. COMMUNICATING EFFECTIVELY  ______________________________________________________________________  Through communication, we can have an impact on how others act toward us and how much they take our preferences and feelings into account.  ______________________________________________________________________    Components of Communication:    1)  Body Language (non-verbal behavior)    2)  What you say (speech content)    3)  How you say it (tone of voice)    Method of Effective Communication:     1)    Plan what you will say (What do you need?)    2)   Avoid framing requests using Why?  or other blaming types of   statements (these typically elicit defensive responses from the other person)    3)  Consider the following framework (X = a particular situation;   Y = a specific behavior (can be verbal or non-verbal; Z = a specific feeling)    a)  When stating how you need help:  In [Situation X], what I need from   you is [Y]. b)  If someone did / said something un-helpful:  In [Situation X],            when you did / said [Y], I felt [Z]. Heres what you could do   instead [State what you believe would have been a more helpful Y].      4)  The preceding framework has four key

## 2018-02-19 NOTE — PROGRESS NOTES
Behavioral Health Consultation  Chrissie Velásquez, Ph.D.  Psychologist  2/19/2018  11:15 AM      Time spent with Patient: 30 minutes  This is patient's eighth  [facility] appointment. Reason for Consult:    Chief Complaint   Patient presents with    Anxiety     Referring Provider: Amish Navas MD  [address]    Feedback given to PCP. S:  Patient seen for follow-up of anxiety and alcohol use disorder, in sustained remission. Patient reported increased anxiety related to son's wife having a baby that is likely not her son's and uncertainty about her future work and living situation. Discussed insurance issues and created plan. Discussed assertive communication w sister. O:  MSE:    Appearance    alert, cooperative  Appetite normal  Sleep disturbance Yes  Fatigue Yes  Loss of pleasure No  Impulsive behavior No  Speech    spontaneous, normal rate, normal volume and well articulated  Mood    Anxious  Affect    anxiety  Thought Content    intact and all or nothing thinking  Thought Process    goal directed and coherent  Associations    logical connections  Insight    Fair  Judgment    Intact  Orientation    oriented to person, place, time, and general circumstances  Memory    recent and remote memory intact  Attention/Concentration    intact  Morbid ideation No  Suicide Assessment    no suicidal ideation    History:    Medications:   Current Outpatient Prescriptions   Medication Sig Dispense Refill    metoprolol succinate (TOPROL XL) 25 MG extended release tablet TAKE ONE TABLET BY MOUTH DAILY 90 tablet 0    busPIRone (BUSPAR) 7.5 MG tablet Take 1 tablet by mouth 2 times daily 60 tablet 5    naproxen (NAPROSYN) 500 MG tablet Take 1 tablet by mouth 2 times daily (with meals) 60 tablet 5    tiZANidine (ZANAFLEX) 2 MG tablet Take 1 tablet by mouth every 8 hours as needed (back pain) 30 tablet 5    fluocinonide (LIDEX) 0.05 % cream Apply topically 2 times daily.  30 g 1    albuterol

## 2018-02-22 ENCOUNTER — TELEPHONE (OUTPATIENT)
Dept: INTERNAL MEDICINE CLINIC | Age: 58
End: 2018-02-22

## 2018-02-22 NOTE — TELEPHONE ENCOUNTER
Yes, her care should be covered under [facility]. Can you please call her to advise? Thanks Windy Man!

## 2018-02-22 NOTE — TELEPHONE ENCOUNTER
Patient has [address]. She was advised her plan will not cover behavioral health visits. She states she has an opportunity to change insurance to [employer] but wants to make sure that Dr Dillon Padilla would be covered. I told her that I think [facility] but she ask that I send a message to Dr Dillon Padilla as she wants to be sure before she changes plans.

## 2018-03-01 ENCOUNTER — OFFICE VISIT (OUTPATIENT)
Dept: INTERNAL MEDICINE CLINIC | Age: 58
End: 2018-03-01

## 2018-03-01 VITALS
DIASTOLIC BLOOD PRESSURE: 80 MMHG | SYSTOLIC BLOOD PRESSURE: 138 MMHG | BODY MASS INDEX: 24.91 KG/M2 | HEIGHT: 66 IN | WEIGHT: 155 LBS | HEART RATE: 72 BPM

## 2018-03-01 DIAGNOSIS — Z12.31 ENCOUNTER FOR SCREENING MAMMOGRAM FOR BREAST CANCER: ICD-10-CM

## 2018-03-01 DIAGNOSIS — M47.816 DEGENERATIVE JOINT DISEASE OF CERVICAL AND LUMBAR SPINE: ICD-10-CM

## 2018-03-01 DIAGNOSIS — T14.8XXA BRUISE: ICD-10-CM

## 2018-03-01 DIAGNOSIS — K21.9 GASTROESOPHAGEAL REFLUX DISEASE, ESOPHAGITIS PRESENCE NOT SPECIFIED: ICD-10-CM

## 2018-03-01 DIAGNOSIS — Z12.11 ENCOUNTER FOR SCREENING COLONOSCOPY: ICD-10-CM

## 2018-03-01 DIAGNOSIS — I10 ESSENTIAL HYPERTENSION: Primary | ICD-10-CM

## 2018-03-01 DIAGNOSIS — F43.22 ADJUSTMENT DISORDER WITH ANXIOUS MOOD: ICD-10-CM

## 2018-03-01 DIAGNOSIS — M47.812 DEGENERATIVE JOINT DISEASE OF CERVICAL AND LUMBAR SPINE: ICD-10-CM

## 2018-03-01 PROCEDURE — G8427 DOCREV CUR MEDS BY ELIG CLIN: HCPCS | Performed by: INTERNAL MEDICINE

## 2018-03-01 PROCEDURE — 99214 OFFICE O/P EST MOD 30 MIN: CPT | Performed by: INTERNAL MEDICINE

## 2018-03-01 PROCEDURE — G8419 CALC BMI OUT NRM PARAM NOF/U: HCPCS | Performed by: INTERNAL MEDICINE

## 2018-03-01 PROCEDURE — 1036F TOBACCO NON-USER: CPT | Performed by: INTERNAL MEDICINE

## 2018-03-01 PROCEDURE — 3014F SCREEN MAMMO DOC REV: CPT | Performed by: INTERNAL MEDICINE

## 2018-03-01 PROCEDURE — 3017F COLORECTAL CA SCREEN DOC REV: CPT | Performed by: INTERNAL MEDICINE

## 2018-03-01 PROCEDURE — G8484 FLU IMMUNIZE NO ADMIN: HCPCS | Performed by: INTERNAL MEDICINE

## 2018-03-01 RX ORDER — ASCORBIC ACID 500 MG
500 TABLET ORAL DAILY
Qty: 30 TABLET | Refills: 3 | Status: SHIPPED | OUTPATIENT
Start: 2018-03-01 | End: 2018-06-11 | Stop reason: SDUPTHER

## 2018-03-01 ASSESSMENT — ENCOUNTER SYMPTOMS
WHEEZING: 0
VOMITING: 0
NAUSEA: 0
COUGH: 0
VOICE CHANGE: 0
TROUBLE SWALLOWING: 0
SHORTNESS OF BREATH: 0
ABDOMINAL PAIN: 0

## 2018-03-14 ENCOUNTER — OFFICE VISIT (OUTPATIENT)
Dept: PSYCHOLOGY | Age: 58
End: 2018-03-14

## 2018-03-14 DIAGNOSIS — F43.22 ADJUSTMENT DISORDER WITH ANXIOUS MOOD: Primary | ICD-10-CM

## 2018-03-14 DIAGNOSIS — F10.21 ALCOHOL DEPENDENCE IN REMISSION (HCC): ICD-10-CM

## 2018-03-14 PROCEDURE — 90832 PSYTX W PT 30 MINUTES: CPT | Performed by: PSYCHOLOGIST

## 2018-03-14 NOTE — PROGRESS NOTES
Behavioral Health Consultation  Constance Salas, Ph.D.  Psychologist  3/14/2018  3:10 PM      Time spent with Patient: 30 minutes  This is patient's ninth  [facility] appointment. Reason for Consult:    Chief Complaint   Patient presents with    Depression    Anxiety    Alcohol Problem     Referring Provider: Geovanna Jones MD  [address], [address]    Feedback given to PCP. S:  Pt seen for f/u of anxiety and etoh use disorder. Pt reported continued improved mood and sxs. Working w insurance on getting changed to [employer]. Pt engaging in adaptive coping, working well toward financial independence and is quite proud of her progress. O:  MSE:    Appearance    alert, cooperative  Appetite normal  Sleep disturbance No  Fatigue Yes  Loss of pleasure No  Impulsive behavior No  Speech    spontaneous, normal rate, normal volume and well articulated  Mood    Euthymic  Affect    normal affect  Thought Content    intact  Thought Process    linear, goal directed and coherent  Associations    logical connections  Insight    Fair  Judgment    Intact  Orientation    oriented to person, place, time, and general circumstances  Memory    recent and remote memory intact  Attention/Concentration    impaired  Morbid ideation No  Suicide Assessment    no suicidal ideation    History:    Medications:   Current Outpatient Prescriptions   Medication Sig Dispense Refill    vitamin C (ASCORBIC ACID) 500 MG tablet Take 1 tablet by mouth daily 30 tablet 3    metoprolol succinate (TOPROL XL) 25 MG extended release tablet TAKE ONE TABLET BY MOUTH DAILY 90 tablet 0    busPIRone (BUSPAR) 7.5 MG tablet Take 1 tablet by mouth 2 times daily 60 tablet 5    naproxen (NAPROSYN) 500 MG tablet Take 1 tablet by mouth 2 times daily (with meals) 60 tablet 5    tiZANidine (ZANAFLEX) 2 MG tablet Take 1 tablet by mouth every 8 hours as needed (back pain) 30 tablet 5    fluocinonide (LIDEX) 0.05 % cream Apply topically 2 times daily. Migraines Mother     Heart Disease Father     Hypertension Father     Cancer Father      lung/liver    Asthma Father      A:  Pt's mood and sxs are improving as circumstances and adaptive coping improve. Diagnosis:  Adjustment disorder with anxiety  Substance disorders:  Alcohol Use Disorder, in remission        Diagnosis Date    Alcohol problem drinking     Alcohol withdrawal delirium ([facility] [address] 2/22/2017    Anxiety     [facility], [facility] 2/22/2017    Hypertension     Hyponatremia     Malignant hypertension 3/17/2016    Panic attacks        Plan:  Pt interventions:  Supportive techniques and CBT to target depression and anxiety        Documentation was done using voice recognition dragon software. Every effort was made to ensure accuracy; however, inadvertent, unintentional computerized transcription errors may be present.

## 2018-03-30 ENCOUNTER — TELEPHONE (OUTPATIENT)
Dept: INTERNAL MEDICINE CLINIC | Age: 58
End: 2018-03-30

## 2018-04-03 DIAGNOSIS — I10 ESSENTIAL HYPERTENSION: ICD-10-CM

## 2018-04-03 RX ORDER — METOPROLOL SUCCINATE 25 MG/1
TABLET, EXTENDED RELEASE ORAL
Qty: 90 TABLET | Refills: 1 | Status: SHIPPED | OUTPATIENT
Start: 2018-04-03 | End: 2018-11-08 | Stop reason: SDUPTHER

## 2018-04-04 ENCOUNTER — TELEPHONE (OUTPATIENT)
Dept: PSYCHOLOGY | Age: 58
End: 2018-04-04

## 2018-04-11 ENCOUNTER — TELEPHONE (OUTPATIENT)
Dept: PSYCHOLOGY | Age: 58
End: 2018-04-11

## 2018-04-12 ENCOUNTER — TELEPHONE (OUTPATIENT)
Dept: INTERNAL MEDICINE CLINIC | Age: 58
End: 2018-04-12

## 2018-04-24 ENCOUNTER — TELEPHONE (OUTPATIENT)
Dept: PSYCHOLOGY | Age: 58
End: 2018-04-24

## 2018-04-25 ENCOUNTER — TELEPHONE (OUTPATIENT)
Dept: PSYCHOLOGY | Age: 58
End: 2018-04-25

## 2018-05-14 ENCOUNTER — OFFICE VISIT (OUTPATIENT)
Dept: PSYCHOLOGY | Age: 58
End: 2018-05-14

## 2018-05-14 DIAGNOSIS — F10.11 ALCOHOL USE DISORDER, MILD, IN SUSTAINED REMISSION: Primary | ICD-10-CM

## 2018-05-14 DIAGNOSIS — F43.22 ADJUSTMENT DISORDER WITH ANXIOUS MOOD: ICD-10-CM

## 2018-05-14 DIAGNOSIS — T14.8XXA BRUISE: ICD-10-CM

## 2018-05-14 LAB
A/G RATIO: 2.1 (ref 1.1–2.2)
ALBUMIN SERPL-MCNC: 5.4 G/DL (ref 3.4–5)
ALP BLD-CCNC: 125 U/L (ref 40–129)
ALT SERPL-CCNC: 17 U/L (ref 10–40)
ANION GAP SERPL CALCULATED.3IONS-SCNC: 23 MMOL/L (ref 3–16)
AST SERPL-CCNC: 27 U/L (ref 15–37)
BILIRUB SERPL-MCNC: 0.4 MG/DL (ref 0–1)
BUN BLDV-MCNC: 7 MG/DL (ref 7–20)
CALCIUM SERPL-MCNC: 9.8 MG/DL (ref 8.3–10.6)
CHLORIDE BLD-SCNC: 88 MMOL/L (ref 99–110)
CO2: 22 MMOL/L (ref 21–32)
CREAT SERPL-MCNC: <0.5 MG/DL (ref 0.6–1.1)
GFR AFRICAN AMERICAN: >60
GFR NON-AFRICAN AMERICAN: >60
GLOBULIN: 2.6 G/DL
GLUCOSE BLD-MCNC: 87 MG/DL (ref 70–99)
HCT VFR BLD CALC: 40.2 % (ref 36–48)
HEMOGLOBIN: 14.2 G/DL (ref 12–16)
MCH RBC QN AUTO: 34.8 PG (ref 26–34)
MCHC RBC AUTO-ENTMCNC: 35.2 G/DL (ref 31–36)
MCV RBC AUTO: 98.7 FL (ref 80–100)
PDW BLD-RTO: 13.6 % (ref 12.4–15.4)
PLATELET # BLD: 207 K/UL (ref 135–450)
PMV BLD AUTO: 8.5 FL (ref 5–10.5)
POTASSIUM SERPL-SCNC: 4.5 MMOL/L (ref 3.5–5.1)
RBC # BLD: 4.08 M/UL (ref 4–5.2)
SODIUM BLD-SCNC: 133 MMOL/L (ref 136–145)
TOTAL PROTEIN: 8 G/DL (ref 6.4–8.2)
WBC # BLD: 7.1 K/UL (ref 4–11)

## 2018-05-14 PROCEDURE — 99999 PR OFFICE/OUTPT VISIT,PROCEDURE ONLY: CPT | Performed by: PSYCHOLOGIST

## 2018-06-11 ENCOUNTER — OFFICE VISIT (OUTPATIENT)
Dept: INTERNAL MEDICINE CLINIC | Age: 58
End: 2018-06-11

## 2018-06-11 VITALS
DIASTOLIC BLOOD PRESSURE: 76 MMHG | WEIGHT: 150 LBS | SYSTOLIC BLOOD PRESSURE: 116 MMHG | HEART RATE: 72 BPM | BODY MASS INDEX: 24.11 KG/M2 | HEIGHT: 66 IN

## 2018-06-11 DIAGNOSIS — J30.9 CHRONIC ALLERGIC RHINITIS, UNSPECIFIED SEASONALITY, UNSPECIFIED TRIGGER: ICD-10-CM

## 2018-06-11 DIAGNOSIS — J43.9 PULMONARY EMPHYSEMA, UNSPECIFIED EMPHYSEMA TYPE (HCC): Primary | ICD-10-CM

## 2018-06-11 DIAGNOSIS — T14.8XXA BRUISE: ICD-10-CM

## 2018-06-11 DIAGNOSIS — I10 ESSENTIAL HYPERTENSION: ICD-10-CM

## 2018-06-11 DIAGNOSIS — M62.830 BACK SPASM: ICD-10-CM

## 2018-06-11 DIAGNOSIS — F41.9 ANXIETY: ICD-10-CM

## 2018-06-11 PROCEDURE — 3023F SPIROM DOC REV: CPT | Performed by: INTERNAL MEDICINE

## 2018-06-11 PROCEDURE — G8926 SPIRO NO PERF OR DOC: HCPCS | Performed by: INTERNAL MEDICINE

## 2018-06-11 PROCEDURE — 3017F COLORECTAL CA SCREEN DOC REV: CPT | Performed by: INTERNAL MEDICINE

## 2018-06-11 PROCEDURE — 1036F TOBACCO NON-USER: CPT | Performed by: INTERNAL MEDICINE

## 2018-06-11 PROCEDURE — 99214 OFFICE O/P EST MOD 30 MIN: CPT | Performed by: INTERNAL MEDICINE

## 2018-06-11 PROCEDURE — G8427 DOCREV CUR MEDS BY ELIG CLIN: HCPCS | Performed by: INTERNAL MEDICINE

## 2018-06-11 PROCEDURE — G8420 CALC BMI NORM PARAMETERS: HCPCS | Performed by: INTERNAL MEDICINE

## 2018-06-11 RX ORDER — ASCORBIC ACID 500 MG
500 TABLET ORAL DAILY
Qty: 30 TABLET | Refills: 3 | Status: SHIPPED | OUTPATIENT
Start: 2018-06-11 | End: 2019-01-13 | Stop reason: SDUPTHER

## 2018-06-11 RX ORDER — TIZANIDINE 2 MG/1
2 TABLET ORAL EVERY 8 HOURS PRN
Qty: 30 TABLET | Refills: 5 | Status: SHIPPED | OUTPATIENT
Start: 2018-06-11 | End: 2018-12-07 | Stop reason: SDUPTHER

## 2018-06-11 RX ORDER — FLUTICASONE PROPIONATE 50 MCG
1 SPRAY, SUSPENSION (ML) NASAL DAILY
Qty: 3 BOTTLE | Refills: 1 | Status: SHIPPED | OUTPATIENT
Start: 2018-06-11 | End: 2019-07-31 | Stop reason: SDUPTHER

## 2018-06-11 RX ORDER — BUSPIRONE HYDROCHLORIDE 7.5 MG/1
7.5 TABLET ORAL 2 TIMES DAILY
Qty: 60 TABLET | Refills: 5 | Status: SHIPPED | OUTPATIENT
Start: 2018-06-11 | End: 2018-12-07 | Stop reason: SDUPTHER

## 2018-06-11 RX ORDER — FOLIC ACID 1 MG/1
1 TABLET ORAL DAILY
Qty: 90 TABLET | Refills: 3 | Status: SHIPPED | OUTPATIENT
Start: 2018-06-11 | End: 2019-06-15 | Stop reason: SDUPTHER

## 2018-06-11 ASSESSMENT — ENCOUNTER SYMPTOMS
WHEEZING: 0
VOMITING: 0
NAUSEA: 0
ABDOMINAL PAIN: 0
PHOTOPHOBIA: 0
COUGH: 0

## 2018-07-11 ENCOUNTER — OFFICE VISIT (OUTPATIENT)
Dept: PSYCHOLOGY | Age: 58
End: 2018-07-11

## 2018-07-11 DIAGNOSIS — F10.21 ALCOHOL DEPENDENCE IN REMISSION (HCC): ICD-10-CM

## 2018-07-11 DIAGNOSIS — F43.22 ADJUSTMENT DISORDER WITH ANXIOUS MOOD: Primary | ICD-10-CM

## 2018-07-11 PROCEDURE — 99999 PR OFFICE/OUTPT VISIT,PROCEDURE ONLY: CPT | Performed by: PSYCHOLOGIST

## 2018-07-11 ASSESSMENT — PATIENT HEALTH QUESTIONNAIRE - PHQ9
1. LITTLE INTEREST OR PLEASURE IN DOING THINGS: 0
7. TROUBLE CONCENTRATING ON THINGS, SUCH AS READING THE NEWSPAPER OR WATCHING TELEVISION: 0
6. FEELING BAD ABOUT YOURSELF - OR THAT YOU ARE A FAILURE OR HAVE LET YOURSELF OR YOUR FAMILY DOWN: 1
3. TROUBLE FALLING OR STAYING ASLEEP: 1
SUM OF ALL RESPONSES TO PHQ9 QUESTIONS 1 & 2: 1
8. MOVING OR SPEAKING SO SLOWLY THAT OTHER PEOPLE COULD HAVE NOTICED. OR THE OPPOSITE, BEING SO FIGETY OR RESTLESS THAT YOU HAVE BEEN MOVING AROUND A LOT MORE THAN USUAL: 0
SUM OF ALL RESPONSES TO PHQ QUESTIONS 1-9: 3
10. IF YOU CHECKED OFF ANY PROBLEMS, HOW DIFFICULT HAVE THESE PROBLEMS MADE IT FOR YOU TO DO YOUR WORK, TAKE CARE OF THINGS AT HOME, OR GET ALONG WITH OTHER PEOPLE: 0
4. FEELING TIRED OR HAVING LITTLE ENERGY: 0
5. POOR APPETITE OR OVEREATING: 0
2. FEELING DOWN, DEPRESSED OR HOPELESS: 1
9. THOUGHTS THAT YOU WOULD BE BETTER OFF DEAD, OR OF HURTING YOURSELF: 0
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
## 2018-07-18 ENCOUNTER — HOSPITAL ENCOUNTER (OUTPATIENT)
Dept: MAMMOGRAPHY | Age: 58
Discharge: HOME | End: 2018-07-18
Attending: INTERNAL MEDICINE | Admitting: INTERNAL MEDICINE

## 2018-07-18 DIAGNOSIS — Z12.31 ENCOUNTER FOR SCREENING MAMMOGRAM FOR BREAST CANCER: ICD-10-CM

## 2018-07-27 ENCOUNTER — TELEPHONE (OUTPATIENT)
Dept: SURGERY | Age: 58
End: 2018-07-27

## 2018-08-15 RX ORDER — SODIUM CHLORIDE 9 MG/ML
INJECTION, SOLUTION INTRAVENOUS CONTINUOUS
Status: CANCELLED | OUTPATIENT
Start: 2018-08-15

## 2018-08-15 RX ORDER — SODIUM CHLORIDE 0.9 % (FLUSH) 0.9 %
10 SYRINGE (ML) INJECTION EVERY 12 HOURS SCHEDULED
Status: CANCELLED | OUTPATIENT
Start: 2018-08-15

## 2018-08-15 RX ORDER — SODIUM CHLORIDE 0.9 % (FLUSH) 0.9 %
10 SYRINGE (ML) INJECTION PRN
Status: CANCELLED | OUTPATIENT
Start: 2018-08-15

## 2018-08-15 RX ORDER — LIDOCAINE HYDROCHLORIDE 10 MG/ML
1 INJECTION, SOLUTION EPIDURAL; INFILTRATION; INTRACAUDAL; PERINEURAL
Status: CANCELLED | OUTPATIENT
Start: 2018-08-15 | End: 2018-08-15

## 2018-08-21 DIAGNOSIS — Z12.11 SCREENING FOR COLON CANCER: Primary | ICD-10-CM

## 2018-08-29 ENCOUNTER — TELEPHONE (OUTPATIENT)
Dept: SURGERY | Age: 58
End: 2018-08-29

## 2018-08-29 NOTE — TELEPHONE ENCOUNTER
Attempted to reach patient. Colonoscopy will need to be rescheduled. LMOM for patient to call office.

## 2018-08-31 ENCOUNTER — HOSPITAL ENCOUNTER (OUTPATIENT)
Dept: ENDOSCOPY | Age: 58
Discharge: HOME | End: 2018-09-19
Attending: SURGERY | Admitting: SURGERY

## 2018-09-10 ENCOUNTER — OFFICE VISIT (OUTPATIENT)
Dept: INTERNAL MEDICINE CLINIC | Age: 58
End: 2018-09-10

## 2018-09-10 ENCOUNTER — OFFICE VISIT (OUTPATIENT)
Dept: PSYCHOLOGY | Age: 58
End: 2018-09-10

## 2018-09-10 VITALS
WEIGHT: 162 LBS | BODY MASS INDEX: 26.03 KG/M2 | SYSTOLIC BLOOD PRESSURE: 158 MMHG | HEART RATE: 80 BPM | DIASTOLIC BLOOD PRESSURE: 100 MMHG | HEIGHT: 66 IN

## 2018-09-10 DIAGNOSIS — F10.21 ALCOHOL USE DISORDER, MODERATE, IN SUSTAINED REMISSION (HCC): Primary | ICD-10-CM

## 2018-09-10 DIAGNOSIS — I10 ESSENTIAL HYPERTENSION: ICD-10-CM

## 2018-09-10 DIAGNOSIS — B35.3 ATHLETE'S FOOT ON LEFT: Primary | ICD-10-CM

## 2018-09-10 DIAGNOSIS — F43.22 ADJUSTMENT DISORDER WITH ANXIOUS MOOD: ICD-10-CM

## 2018-09-10 DIAGNOSIS — J43.9 PULMONARY EMPHYSEMA, UNSPECIFIED EMPHYSEMA TYPE (HCC): ICD-10-CM

## 2018-09-10 DIAGNOSIS — F41.9 ANXIETY: ICD-10-CM

## 2018-09-10 PROCEDURE — 3017F COLORECTAL CA SCREEN DOC REV: CPT | Performed by: INTERNAL MEDICINE

## 2018-09-10 PROCEDURE — G8419 CALC BMI OUT NRM PARAM NOF/U: HCPCS | Performed by: INTERNAL MEDICINE

## 2018-09-10 PROCEDURE — 99999 PR OFFICE/OUTPT VISIT,PROCEDURE ONLY: CPT | Performed by: PSYCHOLOGIST

## 2018-09-10 PROCEDURE — G8428 CUR MEDS NOT DOCUMENT: HCPCS | Performed by: INTERNAL MEDICINE

## 2018-09-10 PROCEDURE — G8926 SPIRO NO PERF OR DOC: HCPCS | Performed by: INTERNAL MEDICINE

## 2018-09-10 PROCEDURE — 1036F TOBACCO NON-USER: CPT | Performed by: INTERNAL MEDICINE

## 2018-09-10 PROCEDURE — 99214 OFFICE O/P EST MOD 30 MIN: CPT | Performed by: INTERNAL MEDICINE

## 2018-09-10 PROCEDURE — 3023F SPIROM DOC REV: CPT | Performed by: INTERNAL MEDICINE

## 2018-09-10 RX ORDER — CLOTRIMAZOLE 1 %
CREAM (GRAM) TOPICAL
Qty: 45 G | Refills: 1 | Status: SHIPPED | OUTPATIENT
Start: 2018-09-10 | End: 2018-09-17

## 2018-09-10 ASSESSMENT — PATIENT HEALTH QUESTIONNAIRE - PHQ9
2. FEELING DOWN, DEPRESSED OR HOPELESS: 1
SUM OF ALL RESPONSES TO PHQ QUESTIONS 1-9: 3
3. TROUBLE FALLING OR STAYING ASLEEP: 1
4. FEELING TIRED OR HAVING LITTLE ENERGY: 0
7. TROUBLE CONCENTRATING ON THINGS, SUCH AS READING THE NEWSPAPER OR WATCHING TELEVISION: 0
5. POOR APPETITE OR OVEREATING: 0
10. IF YOU CHECKED OFF ANY PROBLEMS, HOW DIFFICULT HAVE THESE PROBLEMS MADE IT FOR YOU TO DO YOUR WORK, TAKE CARE OF THINGS AT HOME, OR GET ALONG WITH OTHER PEOPLE: 0
9. THOUGHTS THAT YOU WOULD BE BETTER OFF DEAD, OR OF HURTING YOURSELF: 0
SUM OF ALL RESPONSES TO PHQ QUESTIONS 1-9: 3
6. FEELING BAD ABOUT YOURSELF - OR THAT YOU ARE A FAILURE OR HAVE LET YOURSELF OR YOUR FAMILY DOWN: 1
8. MOVING OR SPEAKING SO SLOWLY THAT OTHER PEOPLE COULD HAVE NOTICED. OR THE OPPOSITE, BEING SO FIGETY OR RESTLESS THAT YOU HAVE BEEN MOVING AROUND A LOT MORE THAN USUAL: 0
1. LITTLE INTEREST OR PLEASURE IN DOING THINGS: 0
SUM OF ALL RESPONSES TO PHQ9 QUESTIONS 1 & 2: 1

## 2018-09-10 ASSESSMENT — ENCOUNTER SYMPTOMS
WHEEZING: 0
SHORTNESS OF BREATH: 0
ABDOMINAL PAIN: 0
VOMITING: 0
NAUSEA: 0

## 2018-09-10 NOTE — PROGRESS NOTES
affecting 4th interdigital space   Psychiatric: She has a normal mood and affect. Thought content normal.   Nursing note and vitals reviewed.       CBC:   Lab Results   Component Value Date    WBC 7.1 05/14/2018    HGB 14.2 05/14/2018    HCT 40.2 05/14/2018     05/14/2018     CMP:   Lab Results   Component Value Date     05/14/2018    K 4.5 05/14/2018    CL 88 05/14/2018    CO2 22 05/14/2018    ANIONGAP 23 05/14/2018    GLUCOSE 87 05/14/2018    BUN 7 05/14/2018    CREATININE <0.5 05/14/2018    GFRAA >60 05/14/2018    GFRAA >60 08/17/2010    CALCIUM 9.8 05/14/2018    PROT 8.0 05/14/2018    PROT 7.3 08/17/2010    LABALBU 5.4 05/14/2018    AGRATIO 2.1 05/14/2018    BILITOT 0.4 05/14/2018    ALKPHOS 125 05/14/2018    ALT 17 05/14/2018    AST 27 05/14/2018    GLOB 2.6 05/14/2018     URINALYSIS:   Lab Results   Component Value Date    GLUCOSEU Negative 02/22/2017    KETUA Negative 02/22/2017    SPECGRAV 1.006 02/22/2017    BLOODU MODERATE 02/22/2017    PHUR 7.0 02/22/2017    PROTEINU Negative 02/22/2017    NITRU Negative 02/22/2017    LEUKOCYTESUR SMALL 02/22/2017    LABMICR YES 02/22/2017    URINETYPE Voided 02/22/2017     HBA1C:   Lab Results   Component Value Date    LABA1C 5.6 08/14/2015     MICRO/ALB:   Lab Results   Component Value Date    LABMICR YES 02/22/2017     LIPID:   Lab Results   Component Value Date    CHOL 200 08/14/2015    CHOL 129 08/14/2015    TRIG 79 08/14/2015    HDL 71 08/14/2015    HDL 65 08/17/2010    LDLCALC 113 08/14/2015    LABVLDL 36 08/17/2010     TSH:   Lab Results   Component Value Date    TSHREFLEX 1.21 05/15/2017     PHQ Scores 9/10/2018 7/11/2018 2/19/2018 1/15/2018 12/13/2017 11/2/2017 10/5/2017   PHQ2 Score 1 1 1 1 1 1 1   PHQ9 Score 3 3 4 2 4 4 2     Interpretation of Total Score Depression Severity: 1-4 = Minimal depression, 5-9 = Mild depression, 10-14 = Moderate depression, 15-19 = Moderately severe depression, 20-27 = Severe depression      ASSESSMENT/PLAN:    Shaista Alvarado was seen today for 3 month follow-up, hypertension and other. Diagnoses and all orders for this visit:    Athlete's foot on left  -     clotrimazole (LOTRIMIN AF) 1 % cream; Apply topically 2 times daily. Pulmonary emphysema, unspecified emphysema type ([address]  Patient has  quitted smoking. Continue albuterol as needed  Essential hypertension  She will monitor her blood pressure while taking medication regularly and will let us know  Anxiety  Well controlled. Continue BuSpar and Lexapro          No orders of the defined types were placed in this encounter. Current Outpatient Prescriptions   Medication Sig Dispense Refill    clotrimazole (LOTRIMIN AF) 1 % cream Apply topically 2 times daily. 45 g 1    escitalopram (LEXAPRO) 20 MG tablet TAKE ONE TABLET BY MOUTH DAILY 30 tablet 5    folic acid (FOLVITE) 1 MG tablet Take 1 tablet by mouth daily 90 tablet 3    fluticasone (FLONASE) 50 MCG/ACT nasal spray 1 spray by Nasal route daily 3 Bottle 1    tiZANidine (ZANAFLEX) 2 MG tablet Take 1 tablet by mouth every 8 hours as needed (back pain) 30 tablet 5    busPIRone (BUSPAR) 7.5 MG tablet Take 1 tablet by mouth 2 times daily 60 tablet 5    vitamin C (ASCORBIC ACID) 500 MG tablet Take 1 tablet by mouth daily 30 tablet 3    losartan (COZAAR) 25 MG tablet TAKE ONE TABLET BY MOUTH DAILY 90 tablet 1    metoprolol succinate (TOPROL XL) 25 MG extended release tablet TAKE ONE TABLET BY MOUTH DAILY 90 tablet 1    naproxen (NAPROSYN) 500 MG tablet Take 1 tablet by mouth 2 times daily (with meals) (Patient taking differently: Take 500 mg by mouth 2 times daily as needed ) 60 tablet 5    fluocinonide (LIDEX) 0.05 % cream Apply topically 2 times daily.  30 g 1    albuterol sulfate HFA (VENTOLIN HFA) 108 (90 Base) MCG/ACT inhaler Inhale 2 puffs into the lungs every 6 hours as needed for Wheezing 1 Inhaler 3    ranitidine (ZANTAC) 150 MG tablet Take 1 tablet by mouth 2 times daily as needed for Heartburn 180 tablet 1  aspirin 81 MG tablet Take 81 mg by mouth daily      vitamin B-1 (THIAMINE) 100 MG tablet Take 1 tablet by mouth daily 90 tablet 1    polyethylene glycol (GOLYTELY) 236 g solution Please follow prep instructions provided by doctor's office. 4000 mL 0     No current facility-administered medications for this visit. Return in about 3 months (around 12/10/2018) for physical.  An After Visit Summary was printed and given to the patient. Documentation was done using voice recognition dragon software. Every effort was made to ensure accuracy; however, inadvertent  Unintentional computerized transcription errors may be present.

## 2018-09-10 NOTE — PATIENT INSTRUCTIONS
Follow-up with Dr. Carmel Bales as needed.  If the following occur, call our office to schedule an appointment:   - If really struggling with sleep  - If struggling with concentration  - If anxiety or depression seem worse  - If craving for alcohol becomes overwhelming
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
## 2018-09-12 ENCOUNTER — TELEPHONE (OUTPATIENT)
Dept: INTERNAL MEDICINE CLINIC | Age: 58
End: 2018-09-12

## 2018-10-01 ENCOUNTER — TELEPHONE (OUTPATIENT)
Dept: SURGERY | Age: 58
End: 2018-10-01

## 2018-10-30 NOTE — PROGRESS NOTES
Patient not reached. Preop instructions left on voice mail.  [phone]______________    -Date_11/16/18  ____time_1100______arrival__0930 - [facility]__________  -Nothing to eat or drink after midnight  -Responsible adult 18 or older to stay on site while you are here and drive you home and stay with you after  -Follow any instructions your doctors office has given you  -Bring a complete list of all your medications and supplements  -If you normally take the following medications in the morning please do so with a small    sip of water-heart,blood pressure,seizure,breathing or thyroid-avoid water pillls and any blood pressure medications ending in \"rebekah\" or \"pril\"  -You may use your inhalers  -Take half of your normal dose of any long acting insulins the night before-do not take    any diabetic medications in the morning  -Follow your doctors instructions regarding blood thinners  -Any questions call your surgeons office  Anesthesia attempts to review all Endo charts prior to surgery and will place any PAT orders,Surgery patients will have orders placed based on history in chart which may not be complete  ENDOSCOPY PATIENTS ONLY-FOLLOW YOUR DOCTORS BOWEL PREP INSTRUCTIONS,THIS MAY INCLUDE TAKING A SECOND PORTION OF YOUR PREP AFTER MIDNIGHT

## 2018-10-31 ENCOUNTER — ANESTHESIA EVENT (OUTPATIENT)
Dept: ENDOSCOPY | Age: 58
End: 2018-10-31
Payer: MEDICAID

## 2018-11-07 DIAGNOSIS — Z12.11 SCREENING FOR COLON CANCER: Primary | ICD-10-CM

## 2018-11-15 ENCOUNTER — TELEPHONE (OUTPATIENT)
Dept: SURGERY | Age: 58
End: 2018-11-15

## 2018-11-16 ENCOUNTER — ANESTHESIA (OUTPATIENT)
Dept: ENDOSCOPY | Age: 58
End: 2018-11-16
Payer: MEDICAID

## 2018-11-16 ENCOUNTER — HOSPITAL ENCOUNTER (OUTPATIENT)
Age: 58
Setting detail: OUTPATIENT SURGERY
Discharge: HOME | End: 2018-11-16
Attending: SURGERY | Admitting: SURGERY
Payer: MEDICAID

## 2018-11-16 VITALS
DIASTOLIC BLOOD PRESSURE: 85 MMHG | SYSTOLIC BLOOD PRESSURE: 166 MMHG | TEMPERATURE: 98.1 F | HEIGHT: 67 IN | OXYGEN SATURATION: 100 % | WEIGHT: 158 LBS | HEART RATE: 87 BPM | RESPIRATION RATE: 16 BRPM | BODY MASS INDEX: 24.8 KG/M2

## 2018-11-16 VITALS — DIASTOLIC BLOOD PRESSURE: 47 MMHG | SYSTOLIC BLOOD PRESSURE: 120 MMHG | OXYGEN SATURATION: 100 %

## 2018-11-16 PROCEDURE — 2709999900 HC NON-CHARGEABLE SUPPLY: Performed by: SURGERY

## 2018-11-16 PROCEDURE — 6360000002 HC RX W HCPCS: Performed by: NURSE ANESTHETIST, CERTIFIED REGISTERED

## 2018-11-16 PROCEDURE — 3609010600 HC COLONOSCOPY POLYPECTOMY SNARE/COLD BIOPSY: Performed by: SURGERY

## 2018-11-16 PROCEDURE — 7100000000 HC PACU RECOVERY - FIRST 15 MIN: Performed by: SURGERY

## 2018-11-16 PROCEDURE — 88305 TISSUE EXAM BY PATHOLOGIST: CPT

## 2018-11-16 PROCEDURE — 7100000011 HC PHASE II RECOVERY - ADDTL 15 MIN: Performed by: SURGERY

## 2018-11-16 PROCEDURE — 2580000003 HC RX 258: Performed by: ANESTHESIOLOGY

## 2018-11-16 PROCEDURE — 2580000003 HC RX 258: Performed by: NURSE ANESTHETIST, CERTIFIED REGISTERED

## 2018-11-16 PROCEDURE — 7100000010 HC PHASE II RECOVERY - FIRST 15 MIN: Performed by: SURGERY

## 2018-11-16 PROCEDURE — 3700000001 HC ADD 15 MINUTES (ANESTHESIA): Performed by: SURGERY

## 2018-11-16 PROCEDURE — 7100000001 HC PACU RECOVERY - ADDTL 15 MIN: Performed by: SURGERY

## 2018-11-16 PROCEDURE — 45385 COLONOSCOPY W/LESION REMOVAL: CPT | Performed by: SURGERY

## 2018-11-16 PROCEDURE — 2500000003 HC RX 250 WO HCPCS: Performed by: NURSE ANESTHETIST, CERTIFIED REGISTERED

## 2018-11-16 PROCEDURE — 2500000003 HC RX 250 WO HCPCS

## 2018-11-16 PROCEDURE — 3700000000 HC ANESTHESIA ATTENDED CARE: Performed by: SURGERY

## 2018-11-16 RX ORDER — SODIUM CHLORIDE 9 MG/ML
INJECTION, SOLUTION INTRAVENOUS CONTINUOUS
Status: DISCONTINUED | OUTPATIENT
Start: 2018-11-16 | End: 2018-11-16 | Stop reason: HOSPADM

## 2018-11-16 RX ORDER — SODIUM CHLORIDE 0.9 % (FLUSH) 0.9 %
10 SYRINGE (ML) INJECTION PRN
Status: DISCONTINUED | OUTPATIENT
Start: 2018-11-16 | End: 2018-11-16 | Stop reason: HOSPADM

## 2018-11-16 RX ORDER — SODIUM CHLORIDE 9 MG/ML
INJECTION, SOLUTION INTRAVENOUS CONTINUOUS PRN
Status: DISCONTINUED | OUTPATIENT
Start: 2018-11-16 | End: 2018-11-16 | Stop reason: SDUPTHER

## 2018-11-16 RX ORDER — ONDANSETRON 2 MG/ML
4 INJECTION INTRAMUSCULAR; INTRAVENOUS PRN
Status: DISCONTINUED | OUTPATIENT
Start: 2018-11-16 | End: 2018-11-16 | Stop reason: HOSPADM

## 2018-11-16 RX ORDER — PROPOFOL 10 MG/ML
INJECTION, EMULSION INTRAVENOUS CONTINUOUS PRN
Status: DISCONTINUED | OUTPATIENT
Start: 2018-11-16 | End: 2018-11-16 | Stop reason: SDUPTHER

## 2018-11-16 RX ORDER — SODIUM CHLORIDE 0.9 % (FLUSH) 0.9 %
10 SYRINGE (ML) INJECTION EVERY 12 HOURS SCHEDULED
Status: DISCONTINUED | OUTPATIENT
Start: 2018-11-16 | End: 2018-11-16 | Stop reason: HOSPADM

## 2018-11-16 RX ORDER — LABETALOL HYDROCHLORIDE 5 MG/ML
5 INJECTION, SOLUTION INTRAVENOUS ONCE
Status: COMPLETED | OUTPATIENT
Start: 2018-11-16 | End: 2018-11-16

## 2018-11-16 RX ORDER — LABETALOL HYDROCHLORIDE 5 MG/ML
INJECTION, SOLUTION INTRAVENOUS
Status: COMPLETED
Start: 2018-11-16 | End: 2018-11-16

## 2018-11-16 RX ORDER — LIDOCAINE HYDROCHLORIDE 10 MG/ML
1 INJECTION, SOLUTION EPIDURAL; INFILTRATION; INTRACAUDAL; PERINEURAL
Status: DISCONTINUED | OUTPATIENT
Start: 2018-11-16 | End: 2018-11-16 | Stop reason: HOSPADM

## 2018-11-16 RX ORDER — LIDOCAINE HYDROCHLORIDE 20 MG/ML
INJECTION, SOLUTION INFILTRATION; PERINEURAL PRN
Status: DISCONTINUED | OUTPATIENT
Start: 2018-11-16 | End: 2018-11-16 | Stop reason: SDUPTHER

## 2018-11-16 RX ORDER — PROPOFOL 10 MG/ML
INJECTION, EMULSION INTRAVENOUS PRN
Status: DISCONTINUED | OUTPATIENT
Start: 2018-11-16 | End: 2018-11-16 | Stop reason: SDUPTHER

## 2018-11-16 RX ADMIN — PROPOFOL 200 MCG/KG/MIN: 10 INJECTION, EMULSION INTRAVENOUS at 11:51

## 2018-11-16 RX ADMIN — SODIUM CHLORIDE: 9 INJECTION, SOLUTION INTRAVENOUS at 11:15

## 2018-11-16 RX ADMIN — PROPOFOL 60 MG: 10 INJECTION, EMULSION INTRAVENOUS at 12:04

## 2018-11-16 RX ADMIN — PROPOFOL 50 MG: 10 INJECTION, EMULSION INTRAVENOUS at 11:55

## 2018-11-16 RX ADMIN — LIDOCAINE HYDROCHLORIDE 100 MG: 20 INJECTION, SOLUTION INFILTRATION; PERINEURAL at 11:46

## 2018-11-16 RX ADMIN — SODIUM CHLORIDE: 9 INJECTION, SOLUTION INTRAVENOUS at 11:23

## 2018-11-16 RX ADMIN — PROPOFOL 200 MG: 10 INJECTION, EMULSION INTRAVENOUS at 11:46

## 2018-11-16 RX ADMIN — LABETALOL HYDROCHLORIDE 5 MG: 5 INJECTION, SOLUTION INTRAVENOUS at 13:09

## 2018-11-16 RX ADMIN — LABETALOL HYDROCHLORIDE 5 MG: 5 INJECTION INTRAVENOUS at 13:09

## 2018-11-16 ASSESSMENT — PULMONARY FUNCTION TESTS
PIF_VALUE: 0

## 2018-11-16 ASSESSMENT — PAIN - FUNCTIONAL ASSESSMENT: PAIN_FUNCTIONAL_ASSESSMENT: 0-10

## 2018-11-16 NOTE — ANESTHESIA PRE PROCEDURE
Department of Anesthesiology  Preprocedure Note       Name:  Savita Martínez   Age:  61 y.o.  :  1960                                          MRN:  [id number]         Date:  2018      Surgeon: Jena Curry):  Kierra Ross MD    Procedure: COLONOSCOPY (N/A )    Medications prior to admission:   Prior to Admission medications    Medication Sig Start Date End Date Taking? Authorizing Provider   metoprolol succinate (TOPROL XL) 25 MG extended release tablet TAKE ONE TABLET BY MOUTH DAILY 18  Yes Keara Ba MD   losartan (COZAAR) 25 MG tablet TAKE ONE TABLET BY MOUTH DAILY 10/8/18  Yes Keara Ba MD   escitalopram (LEXAPRO) 20 MG tablet TAKE ONE TABLET BY MOUTH DAILY 18  Yes RYLAND Garber MD   folic acid (FOLVITE) 1 MG tablet Take 1 tablet by mouth daily 18  Yes Keara Ba MD   fluticasone (FLONASE) 50 MCG/ACT nasal spray 1 spray by Nasal route daily 18  Yes Keara Ba MD   tiZANidine (ZANAFLEX) 2 MG tablet Take 1 tablet by mouth every 8 hours as needed (back pain) 18  Yes Keara Ba MD   busPIRone (BUSPAR) 7.5 MG tablet Take 1 tablet by mouth 2 times daily 18  Yes Keara Ba MD   vitamin C (ASCORBIC ACID) 500 MG tablet Take 1 tablet by mouth daily 18  Yes Keara Ba MD   albuterol sulfate HFA (VENTOLIN HFA) 108 (90 Base) MCG/ACT inhaler Inhale 2 puffs into the lungs every 6 hours as needed for Wheezing 17  Yes RYLAND Garber MD   vitamin B-1 (THIAMINE) 100 MG tablet Take 1 tablet by mouth daily 17  Yes Stalin Butler MD   polyethylene glycol (GOLYTELY) 236 g solution Please follow prep instructions provided by doctor's office. 18   Kierra Ross MD   polyethylene glycol (GOLYTELY) 236 g solution Please follow prep instructions provided by doctor's office.  18   Kierra Ross MD   naproxen (NAPROSYN) 500 MG tablet Take 1 tablet by mouth 2 times daily (with meals)  Patient taking differently: Take

## 2018-11-16 NOTE — H&P
PRE-ENDOSCOPY H&P    Visit Date: 11/16/2018    History:     Diana Disla is a 61 y.o. female who presents today for endoscopy procedure. See A/P below for indications. Problem list, PMH, PSH, Meds, Allergies reviewed with patient. Physical Exam:     BP (!) 178/89   Pulse 95   Temp 98.1 °F (36.7 °C) (Temporal)   Resp 18   Ht 5' 7\" (1.702 m)   Wt 158 lb (71.7 kg)   SpO2 100%   BMI 24.75 kg/m²  Body mass index is 24.75 kg/m². Constitutional: Appears well-developed and well-nourished. Grooming appropriate. Head: Normocephalic, atraumatic. Eyes: No scleral icterus. Vision intact grossly. ENT: Hearing grossly intact. No facial deformity. Cardiovascular: Normal rate on monitor. Pulmonary/Chest: Effort normal. No respiratory distress. No wheezes. No use of accessory muscles. Musculoskeletal: Normal range of motion of UE. No gross deformity. Neurological: Alert and oriented to person, place, and time. No gross deficits. Psychiatric: Normal mood and affect. Behavior normal. Oriented to person, place, and time. Abdomen: soft, NTTP, non distended    Recent labs/radiology reviewed as appropriate    Assessment/Plan:     Previous colonoscopy: no  Family history of colorectal cancer: no  Symptoms: yes - diarrhea    Anesthesia to provide sedation. Please see their documentation regarding airway and ASA classification. Proceed as planned for endoscopy, possible polypectomy    Risks/benefits/alternatives of procedure discussed with patient and any present family members. Risks including, but not limited to: bleeding, perforation, post polypectomy syndrome, splenic injury, need for additional procedures or surgery, risks of anesthesia. Patient understands it is their responsibility to call office for pathology results if they do not hear from my office within 1-2 weeks. All questions answered.     Electronically signed by Mary Beth Flanagan MD on 11/16/2018 at 10:42 AM

## 2018-12-10 ENCOUNTER — OFFICE VISIT (OUTPATIENT)
Dept: INTERNAL MEDICINE CLINIC | Age: 58
End: 2018-12-10
Payer: COMMERCIAL

## 2018-12-10 VITALS
SYSTOLIC BLOOD PRESSURE: 138 MMHG | HEIGHT: 67 IN | DIASTOLIC BLOOD PRESSURE: 72 MMHG | HEART RATE: 72 BPM | BODY MASS INDEX: 25.43 KG/M2 | WEIGHT: 162 LBS

## 2018-12-10 DIAGNOSIS — M47.812 DEGENERATIVE JOINT DISEASE OF CERVICAL AND LUMBAR SPINE: ICD-10-CM

## 2018-12-10 DIAGNOSIS — Z23 NEED FOR INFLUENZA VACCINATION: ICD-10-CM

## 2018-12-10 DIAGNOSIS — F43.22 ADJUSTMENT DISORDER WITH ANXIOUS MOOD: ICD-10-CM

## 2018-12-10 DIAGNOSIS — M47.816 DEGENERATIVE JOINT DISEASE OF CERVICAL AND LUMBAR SPINE: ICD-10-CM

## 2018-12-10 DIAGNOSIS — I10 ESSENTIAL HYPERTENSION: Primary | ICD-10-CM

## 2018-12-10 DIAGNOSIS — M62.830 LUMBAR PARASPINAL MUSCLE SPASM: ICD-10-CM

## 2018-12-10 PROCEDURE — 1036F TOBACCO NON-USER: CPT | Performed by: INTERNAL MEDICINE

## 2018-12-10 PROCEDURE — 3017F COLORECTAL CA SCREEN DOC REV: CPT | Performed by: INTERNAL MEDICINE

## 2018-12-10 PROCEDURE — 99214 OFFICE O/P EST MOD 30 MIN: CPT | Performed by: INTERNAL MEDICINE

## 2018-12-10 PROCEDURE — G8427 DOCREV CUR MEDS BY ELIG CLIN: HCPCS | Performed by: INTERNAL MEDICINE

## 2018-12-10 PROCEDURE — G8419 CALC BMI OUT NRM PARAM NOF/U: HCPCS | Performed by: INTERNAL MEDICINE

## 2018-12-10 PROCEDURE — G8482 FLU IMMUNIZE ORDER/ADMIN: HCPCS | Performed by: INTERNAL MEDICINE

## 2018-12-10 PROCEDURE — 90471 IMMUNIZATION ADMIN: CPT | Performed by: INTERNAL MEDICINE

## 2018-12-10 PROCEDURE — 90686 IIV4 VACC NO PRSV 0.5 ML IM: CPT | Performed by: INTERNAL MEDICINE

## 2018-12-10 ASSESSMENT — ENCOUNTER SYMPTOMS
TROUBLE SWALLOWING: 0
VOMITING: 0
NAUSEA: 0
VOICE CHANGE: 0
WHEEZING: 0
SHORTNESS OF BREATH: 0
BACK PAIN: 1
ABDOMINAL PAIN: 0

## 2018-12-10 NOTE — PROGRESS NOTES
Samira Starr  1960  female  60 y.o. SUBJECTIVE:    Chief Complaint   Patient presents with    3 Month Follow-Up    Hypertension       HPI:  Hypertension:    Samira Starr returns for follow up of hypertension. Tolerating medications well and taking them as directed. Does not check BP at home. No symptoms (denies chest pain,dyspnea,edema or TIA's or blurred vision) concerning for end organ damage are present. Patient feels her mood disorder anxiety stress appears better control with current medication. She would like to see Dr. Vaughn Winters. She denies any suicidal or homicidal ideation    Patient has been complaining of on and off lower back pain. Pain get worse with certain activities. She continued to use muscle relaxant. Denies any bladder or bowel incontinence, denies any leg weakness.       Past Medical History:   Diagnosis Date    Alcohol problem drinking     Alcohol withdrawal delirium ([facility] [address] 2/22/2017    Anxiety     Facial bones, closed fracture ([facility] [address] 2/22/2017    Hypertension     Hyponatremia     Malignant hypertension 3/17/2016    Panic attacks      Past Surgical History:   Procedure Laterality Date    COLONOSCOPY  11/16/2018    next colonoscopy in 2028, 10 years    COLONOSCOPY N/A 11/16/2018    COLONOSCOPY POLYPECTOMY SNARE/COLD BIOPSY performed by Sylvia Carballo MD at [address] [place] [address]    OTHER SURGICAL HISTORY      Right carotid artery surgery    TUBAL LIGATION      WRIST GANGLION EXCISION  1988    bilateral [facility] [place]     Social History     Social History    Marital status: Legally      Spouse name: N/A    Number of children: 1    Years of education: N/A     Occupational History    [employer]       5/2017    Previously a nurse's aide      Social History Main Topics    Smoking status: Former Smoker     Types: Cigarettes     Quit date: 10/18/2017    Smokeless tobacco: Never Used    Alcohol use No      Comment: off

## 2019-01-03 ENCOUNTER — HOSPITAL ENCOUNTER (OUTPATIENT)
Dept: GENERAL RADIOLOGY | Age: 59
Discharge: HOME | End: 2019-01-03
Payer: COMMERCIAL

## 2019-01-03 ENCOUNTER — HOSPITAL ENCOUNTER (OUTPATIENT)
Age: 59
Discharge: HOME | End: 2019-01-03
Payer: COMMERCIAL

## 2019-01-03 ENCOUNTER — OFFICE VISIT (OUTPATIENT)
Dept: INTERNAL MEDICINE CLINIC | Age: 59
End: 2019-01-03
Payer: COMMERCIAL

## 2019-01-03 VITALS
SYSTOLIC BLOOD PRESSURE: 160 MMHG | HEIGHT: 67 IN | DIASTOLIC BLOOD PRESSURE: 90 MMHG | WEIGHT: 165 LBS | HEART RATE: 78 BPM | BODY MASS INDEX: 25.9 KG/M2

## 2019-01-03 DIAGNOSIS — J43.9 PULMONARY EMPHYSEMA, UNSPECIFIED EMPHYSEMA TYPE (HCC): ICD-10-CM

## 2019-01-03 DIAGNOSIS — J20.9 ACUTE BRONCHITIS, UNSPECIFIED ORGANISM: ICD-10-CM

## 2019-01-03 DIAGNOSIS — I10 ESSENTIAL HYPERTENSION: ICD-10-CM

## 2019-01-03 DIAGNOSIS — J20.9 ACUTE BRONCHITIS, UNSPECIFIED ORGANISM: Primary | ICD-10-CM

## 2019-01-03 PROCEDURE — 99214 OFFICE O/P EST MOD 30 MIN: CPT | Performed by: INTERNAL MEDICINE

## 2019-01-03 PROCEDURE — 71046 X-RAY EXAM CHEST 2 VIEWS: CPT

## 2019-01-03 RX ORDER — SULFAMETHOXAZOLE AND TRIMETHOPRIM 800; 160 MG/1; MG/1
1 TABLET ORAL 2 TIMES DAILY
Qty: 20 TABLET | Refills: 0 | Status: SHIPPED | OUTPATIENT
Start: 2019-01-03 | End: 2019-01-03 | Stop reason: SDUPTHER

## 2019-01-03 RX ORDER — SULFAMETHOXAZOLE AND TRIMETHOPRIM 800; 160 MG/1; MG/1
1 TABLET ORAL 2 TIMES DAILY
Qty: 20 TABLET | Refills: 0 | Status: SHIPPED | OUTPATIENT
Start: 2019-01-03 | End: 2019-01-13

## 2019-01-03 RX ORDER — GUAIFENESIN 600 MG/1
600 TABLET, EXTENDED RELEASE ORAL 2 TIMES DAILY
Qty: 20 TABLET | Refills: 0 | Status: SHIPPED | OUTPATIENT
Start: 2019-01-03 | End: 2019-01-03 | Stop reason: SDUPTHER

## 2019-01-03 RX ORDER — PREDNISONE 10 MG/1
10 TABLET ORAL 2 TIMES DAILY
Qty: 10 TABLET | Refills: 0 | Status: SHIPPED | OUTPATIENT
Start: 2019-01-03 | End: 2019-01-03 | Stop reason: SDUPTHER

## 2019-01-03 RX ORDER — GUAIFENESIN 600 MG/1
600 TABLET, EXTENDED RELEASE ORAL 2 TIMES DAILY
Qty: 20 TABLET | Refills: 0 | Status: SHIPPED | OUTPATIENT
Start: 2019-01-03 | End: 2019-01-13

## 2019-01-03 RX ORDER — PREDNISONE 10 MG/1
10 TABLET ORAL 2 TIMES DAILY
Qty: 10 TABLET | Refills: 0 | Status: SHIPPED | OUTPATIENT
Start: 2019-01-03 | End: 2019-01-08

## 2019-01-03 ASSESSMENT — ENCOUNTER SYMPTOMS
ABDOMINAL PAIN: 0
WHEEZING: 1
SHORTNESS OF BREATH: 0
NAUSEA: 0
COUGH: 1
HEMOPTYSIS: 0
PHOTOPHOBIA: 0
VOMITING: 0
TROUBLE SWALLOWING: 0
SORE THROAT: 0
CHEST TIGHTNESS: 0
RHINORRHEA: 1

## 2019-02-04 ENCOUNTER — OFFICE VISIT (OUTPATIENT)
Dept: INTERNAL MEDICINE CLINIC | Age: 59
End: 2019-02-04
Payer: COMMERCIAL

## 2019-02-04 VITALS
SYSTOLIC BLOOD PRESSURE: 162 MMHG | WEIGHT: 168 LBS | HEART RATE: 72 BPM | HEIGHT: 67 IN | DIASTOLIC BLOOD PRESSURE: 98 MMHG | BODY MASS INDEX: 26.37 KG/M2

## 2019-02-04 DIAGNOSIS — R05.8 RECURRENT COUGH: Primary | ICD-10-CM

## 2019-02-04 DIAGNOSIS — I10 ESSENTIAL HYPERTENSION: ICD-10-CM

## 2019-02-04 DIAGNOSIS — J30.9 CHRONIC ALLERGIC RHINITIS: ICD-10-CM

## 2019-02-04 DIAGNOSIS — J43.9 PULMONARY EMPHYSEMA, UNSPECIFIED EMPHYSEMA TYPE (HCC): ICD-10-CM

## 2019-02-04 PROCEDURE — 1036F TOBACCO NON-USER: CPT | Performed by: INTERNAL MEDICINE

## 2019-02-04 PROCEDURE — G8427 DOCREV CUR MEDS BY ELIG CLIN: HCPCS | Performed by: INTERNAL MEDICINE

## 2019-02-04 PROCEDURE — G8419 CALC BMI OUT NRM PARAM NOF/U: HCPCS | Performed by: INTERNAL MEDICINE

## 2019-02-04 PROCEDURE — 3017F COLORECTAL CA SCREEN DOC REV: CPT | Performed by: INTERNAL MEDICINE

## 2019-02-04 PROCEDURE — G8482 FLU IMMUNIZE ORDER/ADMIN: HCPCS | Performed by: INTERNAL MEDICINE

## 2019-02-04 PROCEDURE — 99214 OFFICE O/P EST MOD 30 MIN: CPT | Performed by: INTERNAL MEDICINE

## 2019-02-04 PROCEDURE — G8926 SPIRO NO PERF OR DOC: HCPCS | Performed by: INTERNAL MEDICINE

## 2019-02-04 PROCEDURE — 3023F SPIROM DOC REV: CPT | Performed by: INTERNAL MEDICINE

## 2019-02-04 RX ORDER — LOSARTAN POTASSIUM 100 MG/1
100 TABLET ORAL DAILY
Qty: 30 TABLET | Refills: 2 | Status: SHIPPED | OUTPATIENT
Start: 2019-02-04 | End: 2019-05-03 | Stop reason: SDUPTHER

## 2019-02-04 RX ORDER — DOXYCYCLINE HYCLATE 100 MG
100 TABLET ORAL 2 TIMES DAILY
Qty: 20 TABLET | Refills: 0 | Status: SHIPPED | OUTPATIENT
Start: 2019-02-04 | End: 2019-02-14

## 2019-02-04 RX ORDER — PREDNISONE 10 MG/1
10 TABLET ORAL 2 TIMES DAILY
Qty: 10 TABLET | Refills: 0 | Status: SHIPPED | OUTPATIENT
Start: 2019-02-04 | End: 2019-02-09

## 2019-02-04 RX ORDER — MONTELUKAST SODIUM 10 MG/1
10 TABLET ORAL DAILY
Qty: 30 TABLET | Refills: 3 | Status: SHIPPED | OUTPATIENT
Start: 2019-02-04 | End: 2019-06-04 | Stop reason: SDUPTHER

## 2019-02-04 ASSESSMENT — ENCOUNTER SYMPTOMS
ABDOMINAL PAIN: 0
PHOTOPHOBIA: 0
VOMITING: 0
WHEEZING: 1
NAUSEA: 0
VOICE CHANGE: 0
TROUBLE SWALLOWING: 0
COUGH: 1

## 2019-02-13 DIAGNOSIS — F41.9 ANXIETY: ICD-10-CM

## 2019-02-13 RX ORDER — ESCITALOPRAM OXALATE 20 MG/1
TABLET ORAL
Qty: 30 TABLET | Refills: 5 | Status: SHIPPED | OUTPATIENT
Start: 2019-02-13 | End: 2019-08-25 | Stop reason: SDUPTHER

## 2019-03-18 ENCOUNTER — OFFICE VISIT (OUTPATIENT)
Dept: INTERNAL MEDICINE CLINIC | Age: 59
End: 2019-03-18
Payer: COMMERCIAL

## 2019-03-18 VITALS
BODY MASS INDEX: 26.21 KG/M2 | HEIGHT: 67 IN | HEART RATE: 78 BPM | SYSTOLIC BLOOD PRESSURE: 132 MMHG | WEIGHT: 167 LBS | DIASTOLIC BLOOD PRESSURE: 74 MMHG

## 2019-03-18 DIAGNOSIS — B35.3 TINEA PEDIS OF BOTH FEET: ICD-10-CM

## 2019-03-18 DIAGNOSIS — M25.552 LEFT HIP PAIN: ICD-10-CM

## 2019-03-18 DIAGNOSIS — I10 ESSENTIAL HYPERTENSION: ICD-10-CM

## 2019-03-18 DIAGNOSIS — R05.8 RECURRENT COUGH: Primary | ICD-10-CM

## 2019-03-18 DIAGNOSIS — J43.9 PULMONARY EMPHYSEMA, UNSPECIFIED EMPHYSEMA TYPE (HCC): ICD-10-CM

## 2019-03-18 DIAGNOSIS — L30.9 VESICULAR FOOT ECZEMA: ICD-10-CM

## 2019-03-18 PROCEDURE — G8427 DOCREV CUR MEDS BY ELIG CLIN: HCPCS | Performed by: INTERNAL MEDICINE

## 2019-03-18 PROCEDURE — 3023F SPIROM DOC REV: CPT | Performed by: INTERNAL MEDICINE

## 2019-03-18 PROCEDURE — G8419 CALC BMI OUT NRM PARAM NOF/U: HCPCS | Performed by: INTERNAL MEDICINE

## 2019-03-18 PROCEDURE — G8482 FLU IMMUNIZE ORDER/ADMIN: HCPCS | Performed by: INTERNAL MEDICINE

## 2019-03-18 PROCEDURE — G8926 SPIRO NO PERF OR DOC: HCPCS | Performed by: INTERNAL MEDICINE

## 2019-03-18 PROCEDURE — 3017F COLORECTAL CA SCREEN DOC REV: CPT | Performed by: INTERNAL MEDICINE

## 2019-03-18 PROCEDURE — 1036F TOBACCO NON-USER: CPT | Performed by: INTERNAL MEDICINE

## 2019-03-18 PROCEDURE — 99214 OFFICE O/P EST MOD 30 MIN: CPT | Performed by: INTERNAL MEDICINE

## 2019-03-18 ASSESSMENT — ENCOUNTER SYMPTOMS
ABDOMINAL PAIN: 0
COUGH: 0
VOMITING: 0
SHORTNESS OF BREATH: 0
WHEEZING: 0
NAUSEA: 0
CHEST TIGHTNESS: 0

## 2019-03-18 ASSESSMENT — PATIENT HEALTH QUESTIONNAIRE - PHQ9
2. FEELING DOWN, DEPRESSED OR HOPELESS: 0
SUM OF ALL RESPONSES TO PHQ9 QUESTIONS 1 & 2: 0
1. LITTLE INTEREST OR PLEASURE IN DOING THINGS: 0
SUM OF ALL RESPONSES TO PHQ QUESTIONS 1-9: 0
SUM OF ALL RESPONSES TO PHQ QUESTIONS 1-9: 0

## 2019-06-03 ENCOUNTER — OFFICE VISIT (OUTPATIENT)
Dept: INTERNAL MEDICINE CLINIC | Age: 59
End: 2019-06-03
Payer: COMMERCIAL

## 2019-06-03 VITALS
BODY MASS INDEX: 26.31 KG/M2 | SYSTOLIC BLOOD PRESSURE: 134 MMHG | WEIGHT: 168 LBS | DIASTOLIC BLOOD PRESSURE: 84 MMHG | HEART RATE: 66 BPM

## 2019-06-03 DIAGNOSIS — J31.0 RHINOSINUSITIS: ICD-10-CM

## 2019-06-03 DIAGNOSIS — J32.9 RHINOSINUSITIS: ICD-10-CM

## 2019-06-03 DIAGNOSIS — H65.92 OME (OTITIS MEDIA WITH EFFUSION), LEFT: Primary | ICD-10-CM

## 2019-06-03 PROCEDURE — 3017F COLORECTAL CA SCREEN DOC REV: CPT | Performed by: INTERNAL MEDICINE

## 2019-06-03 PROCEDURE — G8419 CALC BMI OUT NRM PARAM NOF/U: HCPCS | Performed by: INTERNAL MEDICINE

## 2019-06-03 PROCEDURE — 99213 OFFICE O/P EST LOW 20 MIN: CPT | Performed by: INTERNAL MEDICINE

## 2019-06-03 PROCEDURE — 1036F TOBACCO NON-USER: CPT | Performed by: INTERNAL MEDICINE

## 2019-06-03 PROCEDURE — G8427 DOCREV CUR MEDS BY ELIG CLIN: HCPCS | Performed by: INTERNAL MEDICINE

## 2019-06-03 RX ORDER — FEXOFENADINE HCL 180 MG/1
180 TABLET ORAL DAILY
Qty: 7 TABLET | Refills: 0 | Status: SHIPPED | OUTPATIENT
Start: 2019-06-03 | End: 2019-07-25 | Stop reason: ALTCHOICE

## 2019-06-03 RX ORDER — PREDNISONE 20 MG/1
20 TABLET ORAL 2 TIMES DAILY
Qty: 10 TABLET | Refills: 0 | Status: SHIPPED | OUTPATIENT
Start: 2019-06-03 | End: 2019-06-08

## 2019-06-03 RX ORDER — AMOXICILLIN AND CLAVULANATE POTASSIUM 875; 125 MG/1; MG/1
1 TABLET, FILM COATED ORAL 2 TIMES DAILY
Qty: 20 TABLET | Refills: 0 | Status: SHIPPED | OUTPATIENT
Start: 2019-06-03 | End: 2019-06-13

## 2019-06-03 ASSESSMENT — ENCOUNTER SYMPTOMS
VOMITING: 0
COUGH: 1
HEMOPTYSIS: 0
SHORTNESS OF BREATH: 0
DIARRHEA: 0
RHINORRHEA: 1
WHEEZING: 0
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
## 2019-06-15 DIAGNOSIS — F41.9 ANXIETY: ICD-10-CM

## 2019-06-15 DIAGNOSIS — M62.830 BACK SPASM: ICD-10-CM

## 2019-06-17 RX ORDER — FOLIC ACID 1 MG/1
TABLET ORAL
Qty: 30 TABLET | Refills: 2 | Status: SHIPPED | OUTPATIENT
Start: 2019-06-17 | End: 2019-09-17 | Stop reason: SDUPTHER

## 2019-06-17 RX ORDER — BUSPIRONE HYDROCHLORIDE 7.5 MG/1
TABLET ORAL
Qty: 60 TABLET | Refills: 4 | Status: SHIPPED | OUTPATIENT
Start: 2019-06-17 | End: 2020-10-19

## 2019-06-17 RX ORDER — TIZANIDINE 2 MG/1
TABLET ORAL
Qty: 30 TABLET | Refills: 4 | Status: SHIPPED | OUTPATIENT
Start: 2019-06-17 | End: 2020-01-02

## 2019-07-25 ENCOUNTER — OFFICE VISIT (OUTPATIENT)
Dept: INTERNAL MEDICINE CLINIC | Age: 59
End: 2019-07-25
Payer: COMMERCIAL

## 2019-07-25 VITALS
HEART RATE: 84 BPM | WEIGHT: 167 LBS | SYSTOLIC BLOOD PRESSURE: 154 MMHG | HEIGHT: 67 IN | BODY MASS INDEX: 26.21 KG/M2 | DIASTOLIC BLOOD PRESSURE: 92 MMHG

## 2019-07-25 DIAGNOSIS — M62.830 LUMBAR PARASPINAL MUSCLE SPASM: ICD-10-CM

## 2019-07-25 DIAGNOSIS — F43.22 ADJUSTMENT DISORDER WITH ANXIOUS MOOD: ICD-10-CM

## 2019-07-25 DIAGNOSIS — L57.0 ACTINIC KERATOSIS: ICD-10-CM

## 2019-07-25 DIAGNOSIS — I49.9 IRREGULAR HEART BEAT: ICD-10-CM

## 2019-07-25 DIAGNOSIS — Z13.220 LIPID SCREENING: ICD-10-CM

## 2019-07-25 DIAGNOSIS — I10 ESSENTIAL HYPERTENSION: Primary | ICD-10-CM

## 2019-07-25 DIAGNOSIS — I10 ESSENTIAL HYPERTENSION: ICD-10-CM

## 2019-07-25 DIAGNOSIS — J43.9 PULMONARY EMPHYSEMA, UNSPECIFIED EMPHYSEMA TYPE (HCC): ICD-10-CM

## 2019-07-25 LAB
A/G RATIO: 2.1 (ref 1.1–2.2)
ALBUMIN SERPL-MCNC: 5.1 G/DL (ref 3.4–5)
ALP BLD-CCNC: 121 U/L (ref 40–129)
ALT SERPL-CCNC: 18 U/L (ref 10–40)
ANION GAP SERPL CALCULATED.3IONS-SCNC: 18 MMOL/L (ref 3–16)
AST SERPL-CCNC: 28 U/L (ref 15–37)
BILIRUB SERPL-MCNC: 0.6 MG/DL (ref 0–1)
BUN BLDV-MCNC: 5 MG/DL (ref 7–20)
CALCIUM SERPL-MCNC: 9.6 MG/DL (ref 8.3–10.6)
CHLORIDE BLD-SCNC: 90 MMOL/L (ref 99–110)
CO2: 22 MMOL/L (ref 21–32)
CREAT SERPL-MCNC: <0.5 MG/DL (ref 0.6–1.1)
GFR AFRICAN AMERICAN: >60
GFR NON-AFRICAN AMERICAN: >60
GLOBULIN: 2.4 G/DL
GLUCOSE BLD-MCNC: 94 MG/DL (ref 70–99)
HCT VFR BLD CALC: 38.5 % (ref 36–48)
HEMOGLOBIN: 13.3 G/DL (ref 12–16)
MCH RBC QN AUTO: 35.4 PG (ref 26–34)
MCHC RBC AUTO-ENTMCNC: 34.6 G/DL (ref 31–36)
MCV RBC AUTO: 102.4 FL (ref 80–100)
PDW BLD-RTO: 12.5 % (ref 12.4–15.4)
PLATELET # BLD: 202 K/UL (ref 135–450)
PMV BLD AUTO: 8.4 FL (ref 5–10.5)
POTASSIUM SERPL-SCNC: 4.6 MMOL/L (ref 3.5–5.1)
RBC # BLD: 3.76 M/UL (ref 4–5.2)
SODIUM BLD-SCNC: 130 MMOL/L (ref 136–145)
TOTAL PROTEIN: 7.5 G/DL (ref 6.4–8.2)
TSH REFLEX: 1.61 UIU/ML (ref 0.27–4.2)
WBC # BLD: 8 K/UL (ref 4–11)

## 2019-07-25 PROCEDURE — 3017F COLORECTAL CA SCREEN DOC REV: CPT | Performed by: INTERNAL MEDICINE

## 2019-07-25 PROCEDURE — G8427 DOCREV CUR MEDS BY ELIG CLIN: HCPCS | Performed by: INTERNAL MEDICINE

## 2019-07-25 PROCEDURE — 93000 ELECTROCARDIOGRAM COMPLETE: CPT | Performed by: INTERNAL MEDICINE

## 2019-07-25 PROCEDURE — 99214 OFFICE O/P EST MOD 30 MIN: CPT | Performed by: INTERNAL MEDICINE

## 2019-07-25 PROCEDURE — 3023F SPIROM DOC REV: CPT | Performed by: INTERNAL MEDICINE

## 2019-07-25 PROCEDURE — 1036F TOBACCO NON-USER: CPT | Performed by: INTERNAL MEDICINE

## 2019-07-25 PROCEDURE — G8926 SPIRO NO PERF OR DOC: HCPCS | Performed by: INTERNAL MEDICINE

## 2019-07-25 PROCEDURE — G8419 CALC BMI OUT NRM PARAM NOF/U: HCPCS | Performed by: INTERNAL MEDICINE

## 2019-07-25 RX ORDER — METOPROLOL SUCCINATE 50 MG/1
50 TABLET, EXTENDED RELEASE ORAL DAILY
Qty: 90 TABLET | Refills: 1 | Status: SHIPPED | OUTPATIENT
Start: 2019-07-25 | End: 2020-01-31

## 2019-07-25 ASSESSMENT — ENCOUNTER SYMPTOMS
WHEEZING: 0
NAUSEA: 0
ABDOMINAL PAIN: 0
SHORTNESS OF BREATH: 0
TROUBLE SWALLOWING: 0
CHEST TIGHTNESS: 0
VOICE CHANGE: 0
VOMITING: 0
PHOTOPHOBIA: 0

## 2019-07-25 NOTE — PROGRESS NOTES
Negative for chest tightness, shortness of breath and wheezing. Cardiovascular: Negative for chest pain and palpitations. Gastrointestinal: Negative for abdominal pain, nausea and vomiting. Endocrine: Negative for polyphagia. Genitourinary: Negative for difficulty urinating and flank pain. Neurological: Negative for dizziness and light-headedness. Psychiatric/Behavioral: Negative for dysphoric mood and sleep disturbance. OBJECTIVE:  Pulse Readings from Last 4 Encounters:   07/25/19 84   06/03/19 66   03/18/19 78   02/04/19 72     Wt Readings from Last 4 Encounters:   07/25/19 167 lb (75.8 kg)   06/03/19 168 lb (76.2 kg)   03/18/19 167 lb (75.8 kg)   02/04/19 168 lb (76.2 kg)     BP Readings from Last 4 Encounters:   07/25/19 (!) 154/92   06/03/19 134/84   03/18/19 132/74   02/04/19 (!) 162/98     Physical Exam   Constitutional: She is oriented to person, place, and time. She appears well-developed and well-nourished. Eyes: Conjunctivae are normal.   Neck: No thyromegaly present. Cardiovascular: Normal rate and normal heart sounds. No murmur heard. Rhythm appears irregular today  Patient denies chest pain, palpitation dizziness  No longer drinking alcohol   Pulmonary/Chest: Effort normal and breath sounds normal.   Abdominal: Soft. Bowel sounds are normal.   Musculoskeletal: She exhibits no edema. Lymphadenopathy:     She has no cervical adenopathy. Neurological: She is alert and oriented to person, place, and time. Skin:   Actinic keratotic-like lesion at the anterior chest wall   Psychiatric: She has a normal mood and affect. Thought content normal.   Nursing note and vitals reviewed.       CBC:   Lab Results   Component Value Date    WBC 7.1 05/14/2018    HGB 14.2 05/14/2018    HCT 40.2 05/14/2018     05/14/2018     CMP:  Lab Results   Component Value Date     05/14/2018    K 4.5 05/14/2018    CL 88 05/14/2018    CO2 22 05/14/2018    ANIONGAP 23 05/14/2018    GLUCOSE 87 XL) 50 MG extended release tablet; Take 1 tablet by mouth daily    Actinic keratosis  -     External Referral To Dermatology    Lipid screening  -     Lipid, Fasting;  Future    Adjustment disorder with anxious mood  Stable with current Lexapro  Dictation side effect          Orders Placed This Encounter   Procedures    CBC     Standing Status:   Future     Standing Expiration Date:   7/25/2020    COMPREHENSIVE METABOLIC PANEL     Standing Status:   Future     Standing Expiration Date:   7/25/2020    TSH with Reflex     Standing Status:   Future     Standing Expiration Date:   7/25/2020    Urinalysis     Standing Status:   Future     Standing Expiration Date:   7/25/2020    Lipid, Fasting     Standing Status:   Future     Standing Expiration Date:   7/25/2020    External Referral To Dermatology     Referral Priority:   Routine     Referral Type:   Eval and Treat     Referral Reason:   Specialty Services Required     Requested Specialty:   Dermatology     Number of Visits Requested:   1    EKG 12 Lead     Order Specific Question:   Reason for Exam?     Answer:   Irregular heart rate     Current Outpatient Medications   Medication Sig Dispense Refill    metoprolol succinate (TOPROL XL) 50 MG extended release tablet Take 1 tablet by mouth daily 90 tablet 1    folic acid (FOLVITE) 1 MG tablet TAKE ONE TABLET BY MOUTH DAILY 30 tablet 2    busPIRone (BUSPAR) 7.5 MG tablet TAKE ONE TABLET BY MOUTH TWICE A DAY 60 tablet 4    tiZANidine (ZANAFLEX) 2 MG tablet TAKE ONE TABLET BY MOUTH EVERY 8 HOURS AS NEEDED FOR BACK PAIN 30 tablet 4    montelukast (SINGULAIR) 10 MG tablet TAKE ONE TABLET BY MOUTH DAILY 90 tablet 1    losartan (COZAAR) 100 MG tablet TAKE ONE TABLET BY MOUTH DAILY 30 tablet 5    naproxen (NAPROSYN) 500 MG tablet Take 1 tablet by mouth 2 times daily as needed (arthritis) 60 tablet 2    escitalopram (LEXAPRO) 20 MG tablet TAKE ONE TABLET BY MOUTH DAILY 30 tablet 5    vitamin C (ASCORBIC ACID) 500

## 2019-08-18 DIAGNOSIS — T14.8XXA BRUISE: ICD-10-CM

## 2019-08-19 RX ORDER — ASCORBIC ACID 500 MG
TABLET ORAL
Qty: 30 TABLET | Refills: 4 | Status: SHIPPED | OUTPATIENT
Start: 2019-08-19 | End: 2020-01-16

## 2019-09-05 ENCOUNTER — OFFICE VISIT (OUTPATIENT)
Dept: INTERNAL MEDICINE CLINIC | Age: 59
End: 2019-09-05
Payer: COMMERCIAL

## 2019-09-05 VITALS
BODY MASS INDEX: 26.21 KG/M2 | HEIGHT: 67 IN | HEART RATE: 78 BPM | WEIGHT: 167 LBS | DIASTOLIC BLOOD PRESSURE: 80 MMHG | SYSTOLIC BLOOD PRESSURE: 120 MMHG

## 2019-09-05 DIAGNOSIS — Z23 NEED FOR INFLUENZA VACCINATION: ICD-10-CM

## 2019-09-05 DIAGNOSIS — I10 ESSENTIAL HYPERTENSION: Primary | ICD-10-CM

## 2019-09-05 DIAGNOSIS — Z13.220 LIPID SCREENING: ICD-10-CM

## 2019-09-05 DIAGNOSIS — R79.89 LOW SERUM SODIUM: ICD-10-CM

## 2019-09-05 DIAGNOSIS — I10 ESSENTIAL HYPERTENSION: ICD-10-CM

## 2019-09-05 DIAGNOSIS — E87.1 CHRONIC HYPONATREMIA: Primary | ICD-10-CM

## 2019-09-05 LAB
ANION GAP SERPL CALCULATED.3IONS-SCNC: 15 MMOL/L (ref 3–16)
BILIRUBIN URINE: NEGATIVE
BLOOD, URINE: NEGATIVE
BUN BLDV-MCNC: 12 MG/DL (ref 7–20)
CALCIUM SERPL-MCNC: 9.4 MG/DL (ref 8.3–10.6)
CHLORIDE BLD-SCNC: 92 MMOL/L (ref 99–110)
CHOLESTEROL, FASTING: 231 MG/DL (ref 0–199)
CLARITY: CLEAR
CO2: 21 MMOL/L (ref 21–32)
COLOR: YELLOW
CREAT SERPL-MCNC: 0.7 MG/DL (ref 0.6–1.1)
EPITHELIAL CELLS, UA: 8 /HPF (ref 0–5)
GFR AFRICAN AMERICAN: >60
GFR NON-AFRICAN AMERICAN: >60
GLUCOSE BLD-MCNC: 118 MG/DL (ref 70–99)
GLUCOSE URINE: NEGATIVE MG/DL
HDLC SERPL-MCNC: 68 MG/DL (ref 40–60)
HYALINE CASTS: 2 /LPF (ref 0–8)
KETONES, URINE: NEGATIVE MG/DL
LDL CHOLESTEROL CALCULATED: 145 MG/DL
LEUKOCYTE ESTERASE, URINE: ABNORMAL
MICROSCOPIC EXAMINATION: YES
NITRITE, URINE: NEGATIVE
PH UA: 7 (ref 5–8)
POTASSIUM SERPL-SCNC: 4.9 MMOL/L (ref 3.5–5.1)
PROTEIN UA: NEGATIVE MG/DL
RBC UA: 3 /HPF (ref 0–4)
SODIUM BLD-SCNC: 128 MMOL/L (ref 136–145)
SPECIFIC GRAVITY UA: 1.01 (ref 1–1.03)
TRIGLYCERIDE, FASTING: 90 MG/DL (ref 0–150)
URINE TYPE: ABNORMAL
UROBILINOGEN, URINE: 0.2 E.U./DL
VLDLC SERPL CALC-MCNC: 18 MG/DL
WBC UA: 4 /HPF (ref 0–5)

## 2019-09-05 PROCEDURE — 99213 OFFICE O/P EST LOW 20 MIN: CPT | Performed by: INTERNAL MEDICINE

## 2019-09-05 PROCEDURE — 1036F TOBACCO NON-USER: CPT | Performed by: INTERNAL MEDICINE

## 2019-09-05 PROCEDURE — 3017F COLORECTAL CA SCREEN DOC REV: CPT | Performed by: INTERNAL MEDICINE

## 2019-09-05 PROCEDURE — G8419 CALC BMI OUT NRM PARAM NOF/U: HCPCS | Performed by: INTERNAL MEDICINE

## 2019-09-05 PROCEDURE — 90686 IIV4 VACC NO PRSV 0.5 ML IM: CPT | Performed by: INTERNAL MEDICINE

## 2019-09-05 PROCEDURE — G8427 DOCREV CUR MEDS BY ELIG CLIN: HCPCS | Performed by: INTERNAL MEDICINE

## 2019-09-05 PROCEDURE — 90471 IMMUNIZATION ADMIN: CPT | Performed by: INTERNAL MEDICINE

## 2019-09-05 ASSESSMENT — ENCOUNTER SYMPTOMS
SHORTNESS OF BREATH: 0
WHEEZING: 0
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
## 2019-12-05 ENCOUNTER — OFFICE VISIT (OUTPATIENT)
Dept: INTERNAL MEDICINE CLINIC | Age: 59
End: 2019-12-05
Payer: COMMERCIAL

## 2019-12-05 VITALS
SYSTOLIC BLOOD PRESSURE: 138 MMHG | DIASTOLIC BLOOD PRESSURE: 70 MMHG | WEIGHT: 169 LBS | HEART RATE: 78 BPM | BODY MASS INDEX: 26.53 KG/M2 | HEIGHT: 67 IN

## 2019-12-05 DIAGNOSIS — I10 ESSENTIAL HYPERTENSION: Primary | ICD-10-CM

## 2019-12-05 DIAGNOSIS — I65.22 ASYMPTOMATIC STENOSIS OF LEFT CAROTID ARTERY: ICD-10-CM

## 2019-12-05 DIAGNOSIS — J43.9 PULMONARY EMPHYSEMA, UNSPECIFIED EMPHYSEMA TYPE (HCC): ICD-10-CM

## 2019-12-05 DIAGNOSIS — F41.9 ANXIETY: ICD-10-CM

## 2019-12-05 DIAGNOSIS — R73.9 HYPERGLYCEMIA: ICD-10-CM

## 2019-12-05 LAB — HBA1C MFR BLD: 5.3 %

## 2019-12-05 PROCEDURE — G8926 SPIRO NO PERF OR DOC: HCPCS | Performed by: INTERNAL MEDICINE

## 2019-12-05 PROCEDURE — 3017F COLORECTAL CA SCREEN DOC REV: CPT | Performed by: INTERNAL MEDICINE

## 2019-12-05 PROCEDURE — G8482 FLU IMMUNIZE ORDER/ADMIN: HCPCS | Performed by: INTERNAL MEDICINE

## 2019-12-05 PROCEDURE — G8598 ASA/ANTIPLAT THER USED: HCPCS | Performed by: INTERNAL MEDICINE

## 2019-12-05 PROCEDURE — G8419 CALC BMI OUT NRM PARAM NOF/U: HCPCS | Performed by: INTERNAL MEDICINE

## 2019-12-05 PROCEDURE — 3023F SPIROM DOC REV: CPT | Performed by: INTERNAL MEDICINE

## 2019-12-05 PROCEDURE — 99214 OFFICE O/P EST MOD 30 MIN: CPT | Performed by: INTERNAL MEDICINE

## 2019-12-05 PROCEDURE — 83036 HEMOGLOBIN GLYCOSYLATED A1C: CPT | Performed by: INTERNAL MEDICINE

## 2019-12-05 PROCEDURE — G8427 DOCREV CUR MEDS BY ELIG CLIN: HCPCS | Performed by: INTERNAL MEDICINE

## 2019-12-05 PROCEDURE — 1036F TOBACCO NON-USER: CPT | Performed by: INTERNAL MEDICINE

## 2019-12-05 ASSESSMENT — ENCOUNTER SYMPTOMS
VOICE CHANGE: 0
VOMITING: 0
SHORTNESS OF BREATH: 0
NAUSEA: 0
TROUBLE SWALLOWING: 0
COUGH: 1
ABDOMINAL PAIN: 0

## 2019-12-27 ENCOUNTER — TELEPHONE (OUTPATIENT)
Dept: INTERNAL MEDICINE CLINIC | Age: 59
End: 2019-12-27

## 2020-02-04 ENCOUNTER — TELEPHONE (OUTPATIENT)
Dept: INTERNAL MEDICINE CLINIC | Age: 60
End: 2020-02-04

## 2020-02-05 ENCOUNTER — TELEPHONE (OUTPATIENT)
Dept: INTERNAL MEDICINE CLINIC | Age: 60
End: 2020-02-05

## 2020-02-05 RX ORDER — VALSARTAN 160 MG/1
160 TABLET ORAL DAILY
Qty: 30 TABLET | Refills: 5 | Status: SHIPPED | OUTPATIENT
Start: 2020-02-05 | End: 2020-08-05

## 2020-02-13 ENCOUNTER — HOSPITAL ENCOUNTER (OUTPATIENT)
Dept: PULMONOLOGY | Age: 60
Discharge: HOME | End: 2020-02-13
Payer: COMMERCIAL

## 2020-02-13 ENCOUNTER — HOSPITAL ENCOUNTER (OUTPATIENT)
Dept: VASCULAR LAB | Age: 60
Discharge: HOME | End: 2020-02-13
Payer: COMMERCIAL

## 2020-02-13 VITALS — OXYGEN SATURATION: 98 % | HEART RATE: 84 BPM | RESPIRATION RATE: 16 BRPM

## 2020-02-13 PROCEDURE — 94760 N-INVAS EAR/PLS OXIMETRY 1: CPT

## 2020-02-13 PROCEDURE — 94010 BREATHING CAPACITY TEST: CPT

## 2020-02-13 PROCEDURE — 6370000000 HC RX 637 (ALT 250 FOR IP): Performed by: INTERNAL MEDICINE

## 2020-02-13 PROCEDURE — 94060 EVALUATION OF WHEEZING: CPT

## 2020-02-13 PROCEDURE — 93880 EXTRACRANIAL BILAT STUDY: CPT

## 2020-02-13 RX ORDER — ALBUTEROL SULFATE 90 UG/1
4 AEROSOL, METERED RESPIRATORY (INHALATION) ONCE
Status: COMPLETED | OUTPATIENT
Start: 2020-02-13 | End: 2020-02-13

## 2020-02-13 RX ADMIN — Medication 4 PUFF: at 07:56

## 2020-02-14 LAB
EXPIRATORY TIME-POST: NORMAL
EXPIRATORY TIME: NORMAL
FEF 25-75% %CHNG: NORMAL
FEF 25-75% %PRED-POST: NORMAL
FEF 25-75% %PRED-PRE: NORMAL
FEF 25-75% PRED: NORMAL
FEF 25-75%-POST: NORMAL
FEF 25-75%-PRE: NORMAL
FEV1 %PRED-POST: 77 %
FEV1 %PRED-PRE: 70 %
FEV1 PRED: NORMAL
FEV1-POST: NORMAL
FEV1-PRE: NORMAL
FEV1/FVC %PRED-POST: NORMAL
FEV1/FVC %PRED-PRE: NORMAL
FEV1/FVC PRED: NORMAL
FEV1/FVC-POST: 90 %
FEV1/FVC-PRE: 85 %
FVC %PRED-POST: NORMAL
FVC %PRED-PRE: NORMAL
FVC PRED: NORMAL
FVC-POST: NORMAL
FVC-PRE: NORMAL
PEF %PRED-POST: NORMAL
PEF %PRED-PRE: NORMAL
PEF PRED: NORMAL
PEF%CHNG: NORMAL
PEF-POST: NORMAL
PEF-PRE: NORMAL

## 2020-02-14 ASSESSMENT — PULMONARY FUNCTION TESTS
FEV1_PERCENT_PREDICTED_POST: 77
FEV1/FVC_POST: 90
FEV1/FVC_PRE: 85
FEV1_PERCENT_PREDICTED_PRE: 70

## 2020-02-17 NOTE — PROCEDURES
[facility]                     [address] [facility], [address]                               PULMONARY FUNCTION    PATIENT NAME: Reyne Ahumada                   :        1960  MED REC NO:   [id number]                          ROOM:  ACCOUNT NO:   [de-identified]                           ADMIT DATE: 2020  PROVIDER:     Lianna Paez MD    DATE OF PROCEDURE:  2020    Spirometry reveals normal FVC. FEV1 is reduced at 2 liters, which is  70% predicted. FEV1/FVC ratio is reduced at 67%. There is 200 mL  improvement in FEV1 after inhaled bronchodilators, but this is only 10%  improvement. Expiratory flow rates are also reduced. IMPRESSION:  Moderate obstructive lung disease with a trend toward  improvement after inhaled bronchodilators.         Joslyn Pro MD    D: 2020 11:57:04       T: 2020 17:14:14     [email or web address]  Job#: [id number]     Doc#: [id number]    CC:

## 2020-03-02 ENCOUNTER — OFFICE VISIT (OUTPATIENT)
Dept: INTERNAL MEDICINE CLINIC | Age: 60
End: 2020-03-02
Payer: COMMERCIAL

## 2020-03-02 VITALS
DIASTOLIC BLOOD PRESSURE: 72 MMHG | WEIGHT: 177 LBS | BODY MASS INDEX: 27.78 KG/M2 | HEART RATE: 84 BPM | HEIGHT: 67 IN | SYSTOLIC BLOOD PRESSURE: 110 MMHG

## 2020-03-02 DIAGNOSIS — L50.9 URTICARIA: ICD-10-CM

## 2020-03-02 PROBLEM — J45.30 MILD PERSISTENT ASTHMA, UNCOMPLICATED: Status: ACTIVE | Noted: 2020-03-02

## 2020-03-02 LAB
ALBUMIN SERPL-MCNC: 4.6 G/DL (ref 3.4–5)
ALP BLD-CCNC: 127 U/L (ref 40–129)
ALT SERPL-CCNC: 12 U/L (ref 10–40)
ANION GAP SERPL CALCULATED.3IONS-SCNC: 21 MMOL/L (ref 3–16)
AST SERPL-CCNC: 22 U/L (ref 15–37)
BASOPHILS ABSOLUTE: 0 K/UL (ref 0–0.2)
BASOPHILS RELATIVE PERCENT: 0.6 %
BILIRUB SERPL-MCNC: 0.3 MG/DL (ref 0–1)
BILIRUBIN DIRECT: <0.2 MG/DL (ref 0–0.3)
BILIRUBIN, INDIRECT: NORMAL MG/DL (ref 0–1)
BUN BLDV-MCNC: 15 MG/DL (ref 7–20)
CALCIUM SERPL-MCNC: 9.5 MG/DL (ref 8.3–10.6)
CHLORIDE BLD-SCNC: 93 MMOL/L (ref 99–110)
CO2: 20 MMOL/L (ref 21–32)
CREAT SERPL-MCNC: 0.7 MG/DL (ref 0.6–1.2)
EOSINOPHILS ABSOLUTE: 0.1 K/UL (ref 0–0.6)
EOSINOPHILS RELATIVE PERCENT: 0.8 %
GFR AFRICAN AMERICAN: >60
GFR NON-AFRICAN AMERICAN: >60
GLUCOSE BLD-MCNC: 83 MG/DL (ref 70–99)
HCT VFR BLD CALC: 37 % (ref 36–48)
HEMOGLOBIN: 12.8 G/DL (ref 12–16)
LYMPHOCYTES ABSOLUTE: 1.5 K/UL (ref 1–5.1)
LYMPHOCYTES RELATIVE PERCENT: 19.3 %
MCH RBC QN AUTO: 34.6 PG (ref 26–34)
MCHC RBC AUTO-ENTMCNC: 34.5 G/DL (ref 31–36)
MCV RBC AUTO: 100.4 FL (ref 80–100)
MONOCYTES ABSOLUTE: 0.6 K/UL (ref 0–1.3)
MONOCYTES RELATIVE PERCENT: 7.2 %
NEUTROPHILS ABSOLUTE: 5.6 K/UL (ref 1.7–7.7)
NEUTROPHILS RELATIVE PERCENT: 72.1 %
PDW BLD-RTO: 13.6 % (ref 12.4–15.4)
PLATELET # BLD: 194 K/UL (ref 135–450)
PMV BLD AUTO: 9.2 FL (ref 5–10.5)
POTASSIUM SERPL-SCNC: 5 MMOL/L (ref 3.5–5.1)
RBC # BLD: 3.69 M/UL (ref 4–5.2)
SODIUM BLD-SCNC: 134 MMOL/L (ref 136–145)
TOTAL PROTEIN: 7 G/DL (ref 6.4–8.2)
WBC # BLD: 7.8 K/UL (ref 4–11)

## 2020-03-02 PROCEDURE — 3023F SPIROM DOC REV: CPT | Performed by: INTERNAL MEDICINE

## 2020-03-02 PROCEDURE — 1036F TOBACCO NON-USER: CPT | Performed by: INTERNAL MEDICINE

## 2020-03-02 PROCEDURE — G8419 CALC BMI OUT NRM PARAM NOF/U: HCPCS | Performed by: INTERNAL MEDICINE

## 2020-03-02 PROCEDURE — G8482 FLU IMMUNIZE ORDER/ADMIN: HCPCS | Performed by: INTERNAL MEDICINE

## 2020-03-02 PROCEDURE — G8926 SPIRO NO PERF OR DOC: HCPCS | Performed by: INTERNAL MEDICINE

## 2020-03-02 PROCEDURE — 3017F COLORECTAL CA SCREEN DOC REV: CPT | Performed by: INTERNAL MEDICINE

## 2020-03-02 PROCEDURE — G8427 DOCREV CUR MEDS BY ELIG CLIN: HCPCS | Performed by: INTERNAL MEDICINE

## 2020-03-02 PROCEDURE — 99214 OFFICE O/P EST MOD 30 MIN: CPT | Performed by: INTERNAL MEDICINE

## 2020-03-02 RX ORDER — AZITHROMYCIN 250 MG/1
250 TABLET, FILM COATED ORAL SEE ADMIN INSTRUCTIONS
Qty: 6 TABLET | Refills: 0 | Status: SHIPPED | OUTPATIENT
Start: 2020-03-02 | End: 2020-03-07

## 2020-03-02 RX ORDER — PREDNISONE 20 MG/1
20 TABLET ORAL 2 TIMES DAILY
Qty: 10 TABLET | Refills: 0 | Status: SHIPPED | OUTPATIENT
Start: 2020-03-02 | End: 2020-03-07

## 2020-03-02 RX ORDER — CETIRIZINE HYDROCHLORIDE 10 MG/1
10 TABLET ORAL DAILY
Qty: 30 TABLET | Refills: 0 | Status: SHIPPED | OUTPATIENT
Start: 2020-03-02 | End: 2020-04-01

## 2020-03-02 SDOH — ECONOMIC STABILITY: TRANSPORTATION INSECURITY
IN THE PAST 12 MONTHS, HAS THE LACK OF TRANSPORTATION KEPT YOU FROM MEDICAL APPOINTMENTS OR FROM GETTING MEDICATIONS?: NO

## 2020-03-02 SDOH — ECONOMIC STABILITY: INCOME INSECURITY: HOW HARD IS IT FOR YOU TO PAY FOR THE VERY BASICS LIKE FOOD, HOUSING, MEDICAL CARE, AND HEATING?: NOT HARD AT ALL

## 2020-03-02 SDOH — ECONOMIC STABILITY: FOOD INSECURITY: WITHIN THE PAST 12 MONTHS, THE FOOD YOU BOUGHT JUST DIDN'T LAST AND YOU DIDN'T HAVE MONEY TO GET MORE.: NEVER TRUE

## 2020-03-02 SDOH — ECONOMIC STABILITY: TRANSPORTATION INSECURITY
IN THE PAST 12 MONTHS, HAS LACK OF TRANSPORTATION KEPT YOU FROM MEETINGS, WORK, OR FROM GETTING THINGS NEEDED FOR DAILY LIVING?: NO

## 2020-03-02 ASSESSMENT — ENCOUNTER SYMPTOMS
TROUBLE SWALLOWING: 0
SHORTNESS OF BREATH: 0
VOMITING: 0
ABDOMINAL PAIN: 0
WHEEZING: 0
COUGH: 0
VOICE CHANGE: 0
NAUSEA: 0

## 2020-03-02 ASSESSMENT — PATIENT HEALTH QUESTIONNAIRE - PHQ9
SUM OF ALL RESPONSES TO PHQ QUESTIONS 1-9: 0
SUM OF ALL RESPONSES TO PHQ QUESTIONS 1-9: 0
SUM OF ALL RESPONSES TO PHQ9 QUESTIONS 1 & 2: 0
1. LITTLE INTEREST OR PLEASURE IN DOING THINGS: 0
2. FEELING DOWN, DEPRESSED OR HOPELESS: 0

## 2020-03-02 NOTE — PROGRESS NOTES
Yousuf Parks  1960  female  60 y.o. SUBJECTIVE:       Chief Complaint   Patient presents with    3 Month Follow-Up    Hypertension    Rash     arms and legs, gone off back, lump rt groin       HPI:  COPD/EMPHYSEMA/ASthma:  Patient cough, sputum production and shortness of breath have not worsened from baseline. Dyspnea on activities is none. Denies chest pain,dizziness, palpitation. Taking current inhalers as prescribed. Hypertension:    Yousuf Parks returns for follow up of hypertension. Tolerating medications well and taking them as directed. No symptoms (denies chest pain,dyspnea,edema or TIA's or blurred vision) concerning for end organ damage are present. Patient has been complaining of gradual onset of urticarial rash affecting almost everywhere in the body. Symptom has been gradually getting better. She denies any sore throat, fever, joint pain, diarrhea, abdominal pain, any neurologic symptoms. She did not have any lip swelling or tongue swelling. She did not have worsening respiratory symptoms. She is not taking any new medication. No change of soaps. Cannot recall any allergen exposure. History of alcohol use disorder in the past and does not drink alcohol anymore.     Past Medical History:   Diagnosis Date    Alcohol problem drinking     Alcohol withdrawal delirium ([facility] [address] 2/22/2017    Anxiety     Facial bones, closed fracture ([facility] [address] 2/22/2017    Hypertension     Hyponatremia     Malignant hypertension 3/17/2016    Panic attacks      Past Surgical History:   Procedure Laterality Date    COLONOSCOPY  11/16/2018    next colonoscopy in 2028, 10 years    COLONOSCOPY N/A 11/16/2018    COLONOSCOPY POLYPECTOMY SNARE/COLD BIOPSY performed by Alysa Moore MD at [address] [place] [address]    OTHER SURGICAL HISTORY      Right carotid artery surgery    TUBAL LIGATION      WRIST GANGLION EXCISION  1988    [facility], [place]     Social History Review of Systems   Constitutional: Negative for unexpected weight change. HENT: Negative for trouble swallowing and voice change. Respiratory: Negative for cough, shortness of breath and wheezing. Cardiovascular: Negative for chest pain and palpitations. Gastrointestinal: Negative for abdominal pain, nausea and vomiting. Genitourinary: Negative for difficulty urinating, flank pain and frequency. Neurological: Negative for dizziness and light-headedness. OBJECTIVE:  Pulse Readings from Last 4 Encounters:   03/02/20 84   02/13/20 84   12/05/19 78   09/05/19 78     Wt Readings from Last 4 Encounters:   03/02/20 177 lb (80.3 kg)   12/05/19 169 lb (76.7 kg)   09/05/19 167 lb (75.8 kg)   07/25/19 167 lb (75.8 kg)     BP Readings from Last 4 Encounters:   03/02/20 110/72   12/05/19 138/70   09/05/19 120/80   07/25/19 (!) 154/92     Physical Exam  Vitals signs and nursing note reviewed. Constitutional:       Appearance: Normal appearance. She is well-developed. Eyes:      General: No scleral icterus. Conjunctiva/sclera: Conjunctivae normal.   Cardiovascular:      Rate and Rhythm: Normal rate and regular rhythm. Pulses: Normal pulses. Heart sounds: Normal heart sounds. Pulmonary:      Effort: Pulmonary effort is normal.      Breath sounds: Normal breath sounds. Abdominal:      General: Bowel sounds are normal.      Palpations: Abdomen is soft. Tenderness: There is no guarding or rebound. Musculoskeletal:      Right lower leg: No edema. Left lower leg: No edema. Skin:     Comments: Scattered urticarial lesion at the trunk as well as upper and lower extremity. Also have unrelated centimeter size infected cyst at the right inner thigh   Neurological:      General: No focal deficit present. Mental Status: She is alert and oriented to person, place, and time.    Psychiatric:         Mood and Affect: Mood normal.         CBC:   Lab Results   Component Value Date WBC 8.0 07/25/2019    HGB 13.3 07/25/2019    HCT 38.5 07/25/2019     07/25/2019     CMP:  Lab Results   Component Value Date     09/05/2019    K 4.9 09/05/2019    CL 92 09/05/2019    CO2 21 09/05/2019    ANIONGAP 15 09/05/2019    GLUCOSE 118 09/05/2019    BUN 12 09/05/2019    CREATININE 0.7 09/05/2019    GFRAA >60 09/05/2019    GFRAA >60 08/17/2010    CALCIUM 9.4 09/05/2019    PROT 7.5 07/25/2019    PROT 7.3 08/17/2010    LABALBU 5.1 07/25/2019    AGRATIO 2.1 07/25/2019    BILITOT 0.6 07/25/2019    ALKPHOS 121 07/25/2019    ALT 18 07/25/2019    AST 28 07/25/2019    GLOB 2.4 07/25/2019     URINALYSIS:  Lab Results   Component Value Date    GLUCOSEU Negative 09/05/2019    KETUA Negative 09/05/2019    SPECGRAV 1.014 09/05/2019    BLOODU Negative 09/05/2019    PHUR 7.0 09/05/2019    PROTEINU Negative 09/05/2019    NITRU Negative 09/05/2019    LEUKOCYTESUR TRACE 09/05/2019    LABMICR YES 09/05/2019    URINETYPE Clean catch 09/05/2019     HBA1C:   Lab Results   Component Value Date    LABA1C 5.3 12/05/2019     MICRO/ALB:   Lab Results   Component Value Date    LABMICR YES 09/05/2019     LIPID:  Lab Results   Component Value Date    CHOL 200 08/14/2015    CHOL 129 08/14/2015    TRIG 79 08/14/2015    HDL 68 09/05/2019    HDL 65 08/17/2010    LDLCALC 145 09/05/2019    LABVLDL 18 09/05/2019     TSH:   Lab Results   Component Value Date    TSHREFLEX 1.61 07/25/2019         ASSESSMENT/PLAN:    Malcom Guzman was seen today for 3 month follow-up, hypertension and rash. Diagnoses and all orders for this visit:    Urticaria of unclear etiology. -     CBC Auto Differential; Future  -     BASIC METABOLIC PANEL; Future  -     HEPATIC FUNCTION PANEL; Future  -     Urinalysis; Future  -     HEPATITIS B SURFACE ANTIGEN; Future  -     HEPATITIS C ANTIBODY; Future  -     azithromycin (ZITHROMAX) 250 MG tablet;  Take 1 tablet by mouth See Admin Instructions for 5 days 500mg on day 1 followed by 250mg on days 2 - 5  - tablet Take 1 tablet by mouth 2 times daily for 5 days 10 tablet 0    cetirizine (ZYRTEC) 10 MG tablet Take 1 tablet by mouth daily 30 tablet 0    escitalopram (LEXAPRO) 20 MG tablet TAKE ONE TABLET BY MOUTH DAILY 30 tablet 5    valsartan (DIOVAN) 160 MG tablet Take 1 tablet by mouth daily 30 tablet 5    metoprolol succinate (TOPROL XL) 50 MG extended release tablet TAKE ONE TABLET BY MOUTH DAILY 90 tablet 1    vitamin C (ASCORBIC ACID) 500 MG tablet TAKE ONE TABLET BY MOUTH DAILY 30 tablet 11    tiZANidine (ZANAFLEX) 2 MG tablet TAKE ONE TABLET BY MOUTH EVERY 8 HOURS AS NEEDED FOR BACK PAIN 30 tablet 5    montelukast (SINGULAIR) 10 MG tablet TAKE ONE TABLET BY MOUTH DAILY 30 tablet 5    naproxen (NAPROSYN) 500 MG tablet TAKE ONE TABLET BY MOUTH TWICE A DAY AS NEEDED FOR ARTHRITIS 60 tablet 5    folic acid (FOLVITE) 1 MG tablet TAKE ONE TABLET BY MOUTH DAILY 30 tablet 11    fluticasone (FLONASE) 50 MCG/ACT nasal spray SPRAY TWO SPRAYS IN EACH NOSTRIL ONCE DAILY 1 Bottle 5    busPIRone (BUSPAR) 7.5 MG tablet TAKE ONE TABLET BY MOUTH TWICE A DAY (Patient taking differently: Take 7.5 mg by mouth Bid prn) 60 tablet 4    VENTOLIN  (90 Base) MCG/ACT inhaler INHALE TWO PUFFS BY MOUTH EVERY 6 HOURS AS NEEDED FOR WHEEZING 1 Inhaler 3    fluocinonide (LIDEX) 0.05 % cream Apply topically 2 times daily. 30 g 1    ranitidine (ZANTAC) 150 MG tablet Take 1 tablet by mouth 2 times daily as needed for Heartburn 180 tablet 1    aspirin 81 MG tablet Take 81 mg by mouth daily      vitamin B-1 (THIAMINE) 100 MG tablet Take 1 tablet by mouth daily 90 tablet 1     No current facility-administered medications for this visit. Return in about 2 weeks (around 3/16/2020) for rash/urticaria. An After Visit Summary was printed and given to the patient. Documentation was done using voice recognition dragon software.   Every effort was made to ensure accuracy; however, inadvertent  Unintentional computerized transcription errors may be present.

## 2020-03-03 LAB
HEPATITIS B SURFACE ANTIGEN INTERPRETATION: NORMAL
HEPATITIS C ANTIBODY INTERPRETATION: NORMAL

## 2020-03-16 ENCOUNTER — OFFICE VISIT (OUTPATIENT)
Dept: INTERNAL MEDICINE CLINIC | Age: 60
End: 2020-03-16
Payer: COMMERCIAL

## 2020-03-16 VITALS
BODY MASS INDEX: 27.88 KG/M2 | SYSTOLIC BLOOD PRESSURE: 128 MMHG | WEIGHT: 177.6 LBS | HEIGHT: 67 IN | DIASTOLIC BLOOD PRESSURE: 80 MMHG | HEART RATE: 72 BPM

## 2020-03-16 DIAGNOSIS — R71.8 ELEVATED MCV: ICD-10-CM

## 2020-03-16 DIAGNOSIS — E87.1 HYPONATREMIA: ICD-10-CM

## 2020-03-16 DIAGNOSIS — L50.9 URTICARIA: ICD-10-CM

## 2020-03-16 LAB
ANION GAP SERPL CALCULATED.3IONS-SCNC: 15 MMOL/L (ref 3–16)
BASOPHILS ABSOLUTE: 0.1 K/UL (ref 0–0.2)
BASOPHILS RELATIVE PERCENT: 0.8 %
BILIRUBIN URINE: NEGATIVE
BLOOD SMEAR REVIEW: NORMAL
BLOOD, URINE: NEGATIVE
BUN BLDV-MCNC: 11 MG/DL (ref 7–20)
CALCIUM SERPL-MCNC: 9.6 MG/DL (ref 8.3–10.6)
CHLORIDE BLD-SCNC: 92 MMOL/L (ref 99–110)
CLARITY: CLEAR
CO2: 24 MMOL/L (ref 21–32)
COLOR: YELLOW
CREAT SERPL-MCNC: 0.6 MG/DL (ref 0.6–1.2)
EOSINOPHILS ABSOLUTE: 0 K/UL (ref 0–0.6)
EOSINOPHILS RELATIVE PERCENT: 0.7 %
FOLATE: >20 NG/ML (ref 4.78–24.2)
GFR AFRICAN AMERICAN: >60
GFR NON-AFRICAN AMERICAN: >60
GLUCOSE BLD-MCNC: 98 MG/DL (ref 70–99)
GLUCOSE URINE: NEGATIVE MG/DL
HCT VFR BLD CALC: 38.6 % (ref 36–48)
HEMOGLOBIN: 13.4 G/DL (ref 12–16)
KETONES, URINE: NEGATIVE MG/DL
LEUKOCYTE ESTERASE, URINE: NEGATIVE
LYMPHOCYTES ABSOLUTE: 1.7 K/UL (ref 1–5.1)
LYMPHOCYTES RELATIVE PERCENT: 25 %
MCH RBC QN AUTO: 35 PG (ref 26–34)
MCHC RBC AUTO-ENTMCNC: 34.8 G/DL (ref 31–36)
MCV RBC AUTO: 100.8 FL (ref 80–100)
MICROSCOPIC EXAMINATION: NORMAL
MONOCYTES ABSOLUTE: 0.5 K/UL (ref 0–1.3)
MONOCYTES RELATIVE PERCENT: 7.1 %
NEUTROPHILS ABSOLUTE: 4.4 K/UL (ref 1.7–7.7)
NEUTROPHILS RELATIVE PERCENT: 66.4 %
NITRITE, URINE: NEGATIVE
PDW BLD-RTO: 13.3 % (ref 12.4–15.4)
PH UA: 6 (ref 5–8)
PLATELET # BLD: 181 K/UL (ref 135–450)
PMV BLD AUTO: 8.8 FL (ref 5–10.5)
POTASSIUM SERPL-SCNC: 5 MMOL/L (ref 3.5–5.1)
PROTEIN UA: NEGATIVE MG/DL
RBC # BLD: 3.83 M/UL (ref 4–5.2)
SODIUM BLD-SCNC: 131 MMOL/L (ref 136–145)
SPECIFIC GRAVITY UA: 1.01 (ref 1–1.03)
URINE TYPE: NORMAL
UROBILINOGEN, URINE: 0.2 E.U./DL
VITAMIN B-12: 589 PG/ML (ref 211–911)
WBC # BLD: 6.7 K/UL (ref 4–11)

## 2020-03-16 PROCEDURE — 3017F COLORECTAL CA SCREEN DOC REV: CPT | Performed by: INTERNAL MEDICINE

## 2020-03-16 PROCEDURE — G8427 DOCREV CUR MEDS BY ELIG CLIN: HCPCS | Performed by: INTERNAL MEDICINE

## 2020-03-16 PROCEDURE — G8482 FLU IMMUNIZE ORDER/ADMIN: HCPCS | Performed by: INTERNAL MEDICINE

## 2020-03-16 PROCEDURE — 99213 OFFICE O/P EST LOW 20 MIN: CPT | Performed by: INTERNAL MEDICINE

## 2020-03-16 PROCEDURE — G8419 CALC BMI OUT NRM PARAM NOF/U: HCPCS | Performed by: INTERNAL MEDICINE

## 2020-03-16 PROCEDURE — 1036F TOBACCO NON-USER: CPT | Performed by: INTERNAL MEDICINE

## 2020-03-16 RX ORDER — MONTELUKAST SODIUM 10 MG/1
TABLET ORAL
Qty: 30 TABLET | Refills: 5 | Status: SHIPPED | OUTPATIENT
Start: 2020-03-16 | End: 2020-10-27

## 2020-03-16 ASSESSMENT — ENCOUNTER SYMPTOMS
WHEEZING: 0
SHORTNESS OF BREATH: 0
RHINORRHEA: 1
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
## 2020-03-17 LAB — HEMATOLOGY PATH CONSULT: NORMAL

## 2020-06-24 ENCOUNTER — TELEPHONE (OUTPATIENT)
Dept: INTERNAL MEDICINE CLINIC | Age: 60
End: 2020-06-24

## 2020-07-01 NOTE — TELEPHONE ENCOUNTER
Per previous denial letter Jacolyn Boast was suggested by insurance carrier.   Please call and clarify with the insurance carrier

## 2020-07-01 NOTE — TELEPHONE ENCOUNTER
Stiolto Respimat 2.5-2.5MCG/ACT aerosol   Message from [employer] Review Type:Prior Auth;  Important - Please read the below note on eAppeals: Please reference the denial letter for information on the rights for an appeal, rationale for the denial, and how to submit an appeal including if any information is needed to support the appeal. Note about urgent situations - Generally, an urgent situation is one which, in the opinion of the provider, the health of the patient may be in serious jeopardy or may experience pain that cannot be adequately controlled while waiting for a decision on the appeal.;

## 2020-07-14 RX ORDER — FOLIC ACID 1 MG/1
TABLET ORAL
Qty: 90 TABLET | Refills: 1 | Status: SHIPPED | OUTPATIENT
Start: 2020-07-14 | End: 2021-01-19

## 2020-07-14 RX ORDER — ESCITALOPRAM OXALATE 20 MG/1
TABLET ORAL
Qty: 90 TABLET | Refills: 1 | Status: SHIPPED | OUTPATIENT
Start: 2020-07-14 | End: 2021-01-19

## 2020-07-14 RX ORDER — METOPROLOL SUCCINATE 50 MG/1
TABLET, EXTENDED RELEASE ORAL
Qty: 90 TABLET | Refills: 0 | Status: SHIPPED | OUTPATIENT
Start: 2020-07-14 | End: 2020-10-19

## 2020-08-20 ENCOUNTER — OFFICE VISIT (OUTPATIENT)
Dept: INTERNAL MEDICINE CLINIC | Age: 60
End: 2020-08-20
Payer: COMMERCIAL

## 2020-08-20 VITALS
SYSTOLIC BLOOD PRESSURE: 138 MMHG | DIASTOLIC BLOOD PRESSURE: 88 MMHG | WEIGHT: 182 LBS | HEART RATE: 72 BPM | HEIGHT: 67 IN | TEMPERATURE: 98.3 F | BODY MASS INDEX: 28.56 KG/M2

## 2020-08-20 PROCEDURE — 99214 OFFICE O/P EST MOD 30 MIN: CPT | Performed by: INTERNAL MEDICINE

## 2020-08-20 PROCEDURE — 3023F SPIROM DOC REV: CPT | Performed by: INTERNAL MEDICINE

## 2020-08-20 PROCEDURE — G8419 CALC BMI OUT NRM PARAM NOF/U: HCPCS | Performed by: INTERNAL MEDICINE

## 2020-08-20 PROCEDURE — G8926 SPIRO NO PERF OR DOC: HCPCS | Performed by: INTERNAL MEDICINE

## 2020-08-20 PROCEDURE — 3017F COLORECTAL CA SCREEN DOC REV: CPT | Performed by: INTERNAL MEDICINE

## 2020-08-20 PROCEDURE — G8427 DOCREV CUR MEDS BY ELIG CLIN: HCPCS | Performed by: INTERNAL MEDICINE

## 2020-08-20 PROCEDURE — 1036F TOBACCO NON-USER: CPT | Performed by: INTERNAL MEDICINE

## 2020-08-20 RX ORDER — DIAPER,BRIEF,INFANT-TODD,DISP
EACH MISCELLANEOUS
Qty: 1 TUBE | Refills: 1 | Status: SHIPPED | OUTPATIENT
Start: 2020-08-20 | End: 2020-08-27

## 2020-08-20 RX ORDER — TIZANIDINE 2 MG/1
TABLET ORAL
Qty: 30 TABLET | Refills: 5 | Status: SHIPPED | OUTPATIENT
Start: 2020-08-20 | End: 2021-04-27

## 2020-08-20 RX ORDER — OMEPRAZOLE 40 MG/1
40 CAPSULE, DELAYED RELEASE ORAL
Qty: 90 CAPSULE | Refills: 1 | Status: SHIPPED | OUTPATIENT
Start: 2020-08-20 | End: 2021-10-25

## 2020-08-20 RX ORDER — VALSARTAN 160 MG/1
TABLET ORAL
Qty: 30 TABLET | Refills: 5 | Status: SHIPPED | OUTPATIENT
Start: 2020-08-20 | End: 2021-02-22 | Stop reason: SDUPTHER

## 2020-08-20 RX ORDER — CLOTRIMAZOLE 1 %
CREAM (GRAM) TOPICAL
Qty: 60 G | Refills: 1 | Status: SHIPPED | OUTPATIENT
Start: 2020-08-20 | End: 2021-08-30

## 2020-08-20 ASSESSMENT — PATIENT HEALTH QUESTIONNAIRE - PHQ9
SUM OF ALL RESPONSES TO PHQ9 QUESTIONS 1 & 2: 0
SUM OF ALL RESPONSES TO PHQ QUESTIONS 1-9: 0
SUM OF ALL RESPONSES TO PHQ QUESTIONS 1-9: 0
2. FEELING DOWN, DEPRESSED OR HOPELESS: 0
1. LITTLE INTEREST OR PLEASURE IN DOING THINGS: 0

## 2020-08-20 ASSESSMENT — ENCOUNTER SYMPTOMS
WHEEZING: 0
SHORTNESS OF BREATH: 0
VOMITING: 0
ABDOMINAL PAIN: 0
NAUSEA: 0
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
## 2020-10-27 RX ORDER — MONTELUKAST SODIUM 10 MG/1
TABLET ORAL
Qty: 90 TABLET | Refills: 4 | Status: SHIPPED | OUTPATIENT
Start: 2020-10-27 | End: 2021-11-08

## 2020-11-20 ENCOUNTER — OFFICE VISIT (OUTPATIENT)
Dept: INTERNAL MEDICINE CLINIC | Age: 60
End: 2020-11-20
Payer: COMMERCIAL

## 2020-11-20 VITALS
WEIGHT: 180 LBS | HEART RATE: 84 BPM | DIASTOLIC BLOOD PRESSURE: 88 MMHG | TEMPERATURE: 97.1 F | SYSTOLIC BLOOD PRESSURE: 132 MMHG | BODY MASS INDEX: 28.25 KG/M2 | HEIGHT: 67 IN

## 2020-11-20 PROBLEM — K44.9 HIATAL HERNIA: Status: ACTIVE | Noted: 2020-11-20

## 2020-11-20 LAB
BILIRUBIN, POC: ABNORMAL
BLOOD URINE, POC: ABNORMAL
CLARITY, POC: ABNORMAL
COLOR, POC: ABNORMAL
GLUCOSE URINE, POC: ABNORMAL
KETONES, POC: ABNORMAL
LEUKOCYTE EST, POC: ABNORMAL
NITRITE, POC: ABNORMAL
PH, POC: 7
PROTEIN, POC: ABNORMAL
SPECIFIC GRAVITY, POC: 1.01
UROBILINOGEN, POC: 0.2

## 2020-11-20 PROCEDURE — G8926 SPIRO NO PERF OR DOC: HCPCS | Performed by: INTERNAL MEDICINE

## 2020-11-20 PROCEDURE — 3023F SPIROM DOC REV: CPT | Performed by: INTERNAL MEDICINE

## 2020-11-20 PROCEDURE — 3017F COLORECTAL CA SCREEN DOC REV: CPT | Performed by: INTERNAL MEDICINE

## 2020-11-20 PROCEDURE — 1036F TOBACCO NON-USER: CPT | Performed by: INTERNAL MEDICINE

## 2020-11-20 PROCEDURE — G8419 CALC BMI OUT NRM PARAM NOF/U: HCPCS | Performed by: INTERNAL MEDICINE

## 2020-11-20 PROCEDURE — G8427 DOCREV CUR MEDS BY ELIG CLIN: HCPCS | Performed by: INTERNAL MEDICINE

## 2020-11-20 PROCEDURE — 81002 URINALYSIS NONAUTO W/O SCOPE: CPT | Performed by: INTERNAL MEDICINE

## 2020-11-20 PROCEDURE — 99214 OFFICE O/P EST MOD 30 MIN: CPT | Performed by: INTERNAL MEDICINE

## 2020-11-20 PROCEDURE — G8482 FLU IMMUNIZE ORDER/ADMIN: HCPCS | Performed by: INTERNAL MEDICINE

## 2020-11-20 RX ORDER — SULFAMETHOXAZOLE AND TRIMETHOPRIM 800; 160 MG/1; MG/1
1 TABLET ORAL 2 TIMES DAILY
Qty: 20 TABLET | Refills: 0 | Status: SHIPPED | OUTPATIENT
Start: 2020-11-20 | End: 2020-11-30

## 2020-11-20 ASSESSMENT — PATIENT HEALTH QUESTIONNAIRE - PHQ9
SUM OF ALL RESPONSES TO PHQ QUESTIONS 1-9: 0
SUM OF ALL RESPONSES TO PHQ QUESTIONS 1-9: 0
SUM OF ALL RESPONSES TO PHQ9 QUESTIONS 1 & 2: 0
1. LITTLE INTEREST OR PLEASURE IN DOING THINGS: 0
SUM OF ALL RESPONSES TO PHQ QUESTIONS 1-9: 0
2. FEELING DOWN, DEPRESSED OR HOPELESS: 0

## 2020-11-20 ASSESSMENT — ENCOUNTER SYMPTOMS
TROUBLE SWALLOWING: 0
CHEST TIGHTNESS: 0
VOICE CHANGE: 0
SHORTNESS OF BREATH: 0
VOMITING: 0
WHEEZING: 0
NAUSEA: 0
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
## 2020-12-02 ENCOUNTER — HOSPITAL ENCOUNTER (OUTPATIENT)
Dept: WOMENS IMAGING | Age: 60
Discharge: HOME | End: 2020-12-02
Payer: COMMERCIAL

## 2020-12-02 PROCEDURE — 77067 SCR MAMMO BI INCL CAD: CPT

## 2021-02-22 ENCOUNTER — OFFICE VISIT (OUTPATIENT)
Dept: INTERNAL MEDICINE CLINIC | Age: 61
End: 2021-02-22
Payer: COMMERCIAL

## 2021-02-22 VITALS
TEMPERATURE: 96.2 F | BODY MASS INDEX: 27.94 KG/M2 | HEART RATE: 84 BPM | SYSTOLIC BLOOD PRESSURE: 138 MMHG | HEIGHT: 67 IN | DIASTOLIC BLOOD PRESSURE: 88 MMHG | WEIGHT: 178 LBS

## 2021-02-22 DIAGNOSIS — J43.9 PULMONARY EMPHYSEMA, UNSPECIFIED EMPHYSEMA TYPE (HCC): ICD-10-CM

## 2021-02-22 DIAGNOSIS — F41.9 ANXIETY: ICD-10-CM

## 2021-02-22 DIAGNOSIS — M62.838 NECK MUSCLE SPASM: ICD-10-CM

## 2021-02-22 DIAGNOSIS — I10 ESSENTIAL HYPERTENSION: Primary | ICD-10-CM

## 2021-02-22 PROCEDURE — G8419 CALC BMI OUT NRM PARAM NOF/U: HCPCS | Performed by: INTERNAL MEDICINE

## 2021-02-22 PROCEDURE — G8482 FLU IMMUNIZE ORDER/ADMIN: HCPCS | Performed by: INTERNAL MEDICINE

## 2021-02-22 PROCEDURE — 3023F SPIROM DOC REV: CPT | Performed by: INTERNAL MEDICINE

## 2021-02-22 PROCEDURE — 3017F COLORECTAL CA SCREEN DOC REV: CPT | Performed by: INTERNAL MEDICINE

## 2021-02-22 PROCEDURE — 99214 OFFICE O/P EST MOD 30 MIN: CPT | Performed by: INTERNAL MEDICINE

## 2021-02-22 PROCEDURE — G8926 SPIRO NO PERF OR DOC: HCPCS | Performed by: INTERNAL MEDICINE

## 2021-02-22 PROCEDURE — 1036F TOBACCO NON-USER: CPT | Performed by: INTERNAL MEDICINE

## 2021-02-22 PROCEDURE — G8427 DOCREV CUR MEDS BY ELIG CLIN: HCPCS | Performed by: INTERNAL MEDICINE

## 2021-02-22 RX ORDER — BUSPIRONE HYDROCHLORIDE 7.5 MG/1
7.5 TABLET ORAL 2 TIMES DAILY PRN
Qty: 180 TABLET | Refills: 0 | Status: SHIPPED | OUTPATIENT
Start: 2021-02-22 | End: 2021-10-25

## 2021-02-22 RX ORDER — VALSARTAN 160 MG/1
TABLET ORAL
Qty: 30 TABLET | Refills: 5 | Status: SHIPPED | OUTPATIENT
Start: 2021-02-22 | End: 2021-08-25 | Stop reason: SDUPTHER

## 2021-02-22 ASSESSMENT — ENCOUNTER SYMPTOMS
VOICE CHANGE: 0
SHORTNESS OF BREATH: 0
TROUBLE SWALLOWING: 0
WHEEZING: 0
ABDOMINAL PAIN: 0
VOMITING: 0
NAUSEA: 0
PHOTOPHOBIA: 0

## 2021-02-22 ASSESSMENT — PATIENT HEALTH QUESTIONNAIRE - PHQ9
SUM OF ALL RESPONSES TO PHQ QUESTIONS 1-9: 0
SUM OF ALL RESPONSES TO PHQ QUESTIONS 1-9: 0
SUM OF ALL RESPONSES TO PHQ9 QUESTIONS 1 & 2: 0
SUM OF ALL RESPONSES TO PHQ QUESTIONS 1-9: 0
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
## 2021-03-04 LAB
HPV COMMENT: NORMAL
HPV TYPE 16: NOT DETECTED
HPV TYPE 18: NOT DETECTED
HPVOH (OTHER TYPES): NOT DETECTED

## 2021-03-05 LAB
ORGANISM: ABNORMAL
URINE CULTURE, ROUTINE: ABNORMAL

## 2021-04-23 DIAGNOSIS — Z78.0 MENOPAUSE: Primary | ICD-10-CM

## 2021-04-27 DIAGNOSIS — M62.830 BACK SPASM: ICD-10-CM

## 2021-04-27 DIAGNOSIS — M62.838 NECK MUSCLE SPASM: ICD-10-CM

## 2021-04-27 RX ORDER — TIZANIDINE 2 MG/1
TABLET ORAL
Qty: 30 TABLET | Refills: 4 | Status: SHIPPED | OUTPATIENT
Start: 2021-04-27 | End: 2021-11-04 | Stop reason: SDUPTHER

## 2021-04-27 RX ORDER — ALBUTEROL SULFATE 90 UG/1
AEROSOL, METERED RESPIRATORY (INHALATION)
Qty: 18 G | Refills: 4 | Status: SHIPPED | OUTPATIENT
Start: 2021-04-27 | End: 2022-01-28

## 2021-05-24 ENCOUNTER — OFFICE VISIT (OUTPATIENT)
Dept: INTERNAL MEDICINE CLINIC | Age: 61
End: 2021-05-24
Payer: COMMERCIAL

## 2021-05-24 VITALS
BODY MASS INDEX: 27 KG/M2 | HEIGHT: 67 IN | HEART RATE: 84 BPM | WEIGHT: 172 LBS | SYSTOLIC BLOOD PRESSURE: 126 MMHG | DIASTOLIC BLOOD PRESSURE: 84 MMHG

## 2021-05-24 DIAGNOSIS — Z13.220 LIPID SCREENING: ICD-10-CM

## 2021-05-24 DIAGNOSIS — Z00.00 ROUTINE PHYSICAL EXAMINATION: Primary | ICD-10-CM

## 2021-05-24 DIAGNOSIS — Z23 NEED FOR SHINGLES VACCINE: ICD-10-CM

## 2021-05-24 DIAGNOSIS — J43.9 PULMONARY EMPHYSEMA, UNSPECIFIED EMPHYSEMA TYPE (HCC): ICD-10-CM

## 2021-05-24 DIAGNOSIS — J20.9 ACUTE BRONCHITIS, UNSPECIFIED ORGANISM: ICD-10-CM

## 2021-05-24 DIAGNOSIS — I10 ESSENTIAL HYPERTENSION: ICD-10-CM

## 2021-05-24 LAB
ANION GAP SERPL CALCULATED.3IONS-SCNC: 18 MMOL/L (ref 3–16)
BILIRUBIN URINE: NEGATIVE
BLOOD, URINE: NEGATIVE
BUN BLDV-MCNC: 6 MG/DL (ref 7–20)
CALCIUM SERPL-MCNC: 9.5 MG/DL (ref 8.3–10.6)
CHLORIDE BLD-SCNC: 90 MMOL/L (ref 99–110)
CHOLESTEROL, FASTING: 205 MG/DL (ref 0–199)
CLARITY: CLEAR
CO2: 22 MMOL/L (ref 21–32)
COLOR: YELLOW
CREAT SERPL-MCNC: 0.6 MG/DL (ref 0.6–1.2)
GFR AFRICAN AMERICAN: >60
GFR NON-AFRICAN AMERICAN: >60
GLUCOSE BLD-MCNC: 107 MG/DL (ref 70–99)
GLUCOSE URINE: NEGATIVE MG/DL
HDLC SERPL-MCNC: 73 MG/DL (ref 40–60)
KETONES, URINE: NEGATIVE MG/DL
LDL CHOLESTEROL CALCULATED: 120 MG/DL
LEUKOCYTE ESTERASE, URINE: NEGATIVE
MICROSCOPIC EXAMINATION: NORMAL
NITRITE, URINE: NEGATIVE
PH UA: 7 (ref 5–8)
POTASSIUM SERPL-SCNC: 4.5 MMOL/L (ref 3.5–5.1)
PROTEIN UA: NEGATIVE MG/DL
SODIUM BLD-SCNC: 130 MMOL/L (ref 136–145)
SPECIFIC GRAVITY UA: 1.01 (ref 1–1.03)
TRIGLYCERIDE, FASTING: 59 MG/DL (ref 0–150)
URINE TYPE: NORMAL
UROBILINOGEN, URINE: 1 E.U./DL
VLDLC SERPL CALC-MCNC: 12 MG/DL

## 2021-05-24 PROCEDURE — 99214 OFFICE O/P EST MOD 30 MIN: CPT | Performed by: INTERNAL MEDICINE

## 2021-05-24 PROCEDURE — 3017F COLORECTAL CA SCREEN DOC REV: CPT | Performed by: INTERNAL MEDICINE

## 2021-05-24 PROCEDURE — G8926 SPIRO NO PERF OR DOC: HCPCS | Performed by: INTERNAL MEDICINE

## 2021-05-24 PROCEDURE — G8419 CALC BMI OUT NRM PARAM NOF/U: HCPCS | Performed by: INTERNAL MEDICINE

## 2021-05-24 PROCEDURE — 90471 IMMUNIZATION ADMIN: CPT | Performed by: INTERNAL MEDICINE

## 2021-05-24 PROCEDURE — G8427 DOCREV CUR MEDS BY ELIG CLIN: HCPCS | Performed by: INTERNAL MEDICINE

## 2021-05-24 PROCEDURE — 90750 HZV VACC RECOMBINANT IM: CPT | Performed by: INTERNAL MEDICINE

## 2021-05-24 PROCEDURE — 3023F SPIROM DOC REV: CPT | Performed by: INTERNAL MEDICINE

## 2021-05-24 PROCEDURE — 1036F TOBACCO NON-USER: CPT | Performed by: INTERNAL MEDICINE

## 2021-05-24 RX ORDER — AMOXICILLIN 500 MG/1
500 CAPSULE ORAL 3 TIMES DAILY
Qty: 30 CAPSULE | Refills: 0 | Status: SHIPPED | OUTPATIENT
Start: 2021-05-24 | End: 2021-06-03

## 2021-05-24 ASSESSMENT — ENCOUNTER SYMPTOMS
VOICE CHANGE: 0
ABDOMINAL PAIN: 0
COUGH: 1
TROUBLE SWALLOWING: 0
NAUSEA: 0
VOMITING: 0

## 2021-05-24 NOTE — PATIENT INSTRUCTIONS
Patient Education        Well Visit, Women 48 to 72: Care Instructions  Overview     Well visits can help you stay healthy. Your doctor has checked your overall health and may have suggested ways to take good care of yourself. Your doctor also may have recommended tests. At home, you can help prevent illness with healthy eating, regular exercise, and other steps. Follow-up care is a key part of your treatment and safety. Be sure to make and go to all appointments, and call your doctor if you are having problems. It's also a good idea to know your test results and keep a list of the medicines you take. How can you care for yourself at home? · Get screening tests that you and your doctor decide on. Screening helps find diseases before any symptoms appear. · Eat healthy foods. Choose fruits, vegetables, whole grains, protein, and low-fat dairy foods. Limit fat, especially saturated fat. Reduce salt in your diet. · Limit alcohol. Have no more than 1 drink a day or 7 drinks a week. · Get at least 30 minutes of exercise on most days of the week. Walking is a good choice. You also may want to do other activities, such as running, swimming, cycling, or playing tennis or team sports. · Reach and stay at a healthy weight. This will lower your risk for many problems, such as obesity, diabetes, heart disease, and high blood pressure. · Do not smoke. Smoking can make health problems worse. If you need help quitting, talk to your doctor about stop-smoking programs and medicines. These can increase your chances of quitting for good. · Care for your mental health. It is easy to get weighed down by worry and stress. Learn strategies to manage stress, like deep breathing and mindfulness, and stay connected with your family and community. If you find you often feel sad or hopeless, talk with your doctor. Treatment can help.   · Talk to your doctor about whether you have any risk factors for sexually transmitted infections
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
## 2021-06-03 DIAGNOSIS — I49.9 IRREGULAR HEART BEAT: ICD-10-CM

## 2021-06-03 DIAGNOSIS — I10 ESSENTIAL HYPERTENSION: ICD-10-CM

## 2021-06-03 RX ORDER — METOPROLOL SUCCINATE 50 MG/1
TABLET, EXTENDED RELEASE ORAL
Qty: 90 TABLET | Refills: 1 | Status: SHIPPED | OUTPATIENT
Start: 2021-06-03 | End: 2022-01-28

## 2021-08-16 DIAGNOSIS — J30.9 CHRONIC ALLERGIC RHINITIS: ICD-10-CM

## 2021-08-16 RX ORDER — FLUTICASONE PROPIONATE 50 MCG
SPRAY, SUSPENSION (ML) NASAL
Qty: 1 BOTTLE | Refills: 2 | Status: SHIPPED | OUTPATIENT
Start: 2021-08-16 | End: 2022-02-04

## 2021-08-16 RX ORDER — GLYCOPYRROLATE AND FORMOTEROL FUMARATE 9; 4.8 UG/1; UG/1
AEROSOL, METERED RESPIRATORY (INHALATION)
Qty: 10.7 G | Refills: 5 | Status: SHIPPED | OUTPATIENT
Start: 2021-08-16 | End: 2022-03-08

## 2021-08-25 ENCOUNTER — OFFICE VISIT (OUTPATIENT)
Dept: INTERNAL MEDICINE CLINIC | Age: 61
End: 2021-08-25
Payer: COMMERCIAL

## 2021-08-25 VITALS
HEIGHT: 67 IN | DIASTOLIC BLOOD PRESSURE: 84 MMHG | SYSTOLIC BLOOD PRESSURE: 122 MMHG | WEIGHT: 182.6 LBS | BODY MASS INDEX: 28.66 KG/M2 | HEART RATE: 76 BPM

## 2021-08-25 DIAGNOSIS — J43.9 PULMONARY EMPHYSEMA, UNSPECIFIED EMPHYSEMA TYPE (HCC): Primary | ICD-10-CM

## 2021-08-25 DIAGNOSIS — I10 ESSENTIAL HYPERTENSION: ICD-10-CM

## 2021-08-25 DIAGNOSIS — K21.9 GASTROESOPHAGEAL REFLUX DISEASE, UNSPECIFIED WHETHER ESOPHAGITIS PRESENT: ICD-10-CM

## 2021-08-25 DIAGNOSIS — M75.02 ADHESIVE CAPSULITIS OF LEFT SHOULDER: ICD-10-CM

## 2021-08-25 PROCEDURE — G8926 SPIRO NO PERF OR DOC: HCPCS | Performed by: INTERNAL MEDICINE

## 2021-08-25 PROCEDURE — 1036F TOBACCO NON-USER: CPT | Performed by: INTERNAL MEDICINE

## 2021-08-25 PROCEDURE — G8419 CALC BMI OUT NRM PARAM NOF/U: HCPCS | Performed by: INTERNAL MEDICINE

## 2021-08-25 PROCEDURE — 90471 IMMUNIZATION ADMIN: CPT | Performed by: INTERNAL MEDICINE

## 2021-08-25 PROCEDURE — 3017F COLORECTAL CA SCREEN DOC REV: CPT | Performed by: INTERNAL MEDICINE

## 2021-08-25 PROCEDURE — G8427 DOCREV CUR MEDS BY ELIG CLIN: HCPCS | Performed by: INTERNAL MEDICINE

## 2021-08-25 PROCEDURE — 99214 OFFICE O/P EST MOD 30 MIN: CPT | Performed by: INTERNAL MEDICINE

## 2021-08-25 PROCEDURE — 3023F SPIROM DOC REV: CPT | Performed by: INTERNAL MEDICINE

## 2021-08-25 PROCEDURE — 90750 HZV VACC RECOMBINANT IM: CPT | Performed by: INTERNAL MEDICINE

## 2021-08-25 RX ORDER — PREDNISONE 20 MG/1
20 TABLET ORAL 2 TIMES DAILY
Qty: 14 TABLET | Refills: 0 | Status: SHIPPED | OUTPATIENT
Start: 2021-08-25 | End: 2021-09-01

## 2021-08-25 RX ORDER — VALSARTAN 160 MG/1
TABLET ORAL
Qty: 30 TABLET | Refills: 5 | Status: SHIPPED | OUTPATIENT
Start: 2021-08-25 | End: 2021-12-02

## 2021-08-25 SDOH — ECONOMIC STABILITY: FOOD INSECURITY: WITHIN THE PAST 12 MONTHS, THE FOOD YOU BOUGHT JUST DIDN'T LAST AND YOU DIDN'T HAVE MONEY TO GET MORE.: NEVER TRUE

## 2021-08-25 SDOH — ECONOMIC STABILITY: FOOD INSECURITY: WITHIN THE PAST 12 MONTHS, YOU WORRIED THAT YOUR FOOD WOULD RUN OUT BEFORE YOU GOT MONEY TO BUY MORE.: NEVER TRUE

## 2021-08-25 ASSESSMENT — ENCOUNTER SYMPTOMS
VOICE CHANGE: 0
TROUBLE SWALLOWING: 0
WHEEZING: 0
SHORTNESS OF BREATH: 0
ABDOMINAL PAIN: 0
VOMITING: 0
NAUSEA: 0

## 2021-08-25 ASSESSMENT — SOCIAL DETERMINANTS OF HEALTH (SDOH): HOW HARD IS IT FOR YOU TO PAY FOR THE VERY BASICS LIKE FOOD, HOUSING, MEDICAL CARE, AND HEATING?: NOT HARD AT ALL

## 2021-08-25 NOTE — PATIENT INSTRUCTIONS
[address] for [address] [facility] [address], option 2, then option 1
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
## 2021-08-29 DIAGNOSIS — B35.3 TINEA PEDIS OF BOTH FEET: ICD-10-CM

## 2021-08-30 RX ORDER — CLOTRIMAZOLE 1 %
CREAM (GRAM) TOPICAL
Qty: 60 G | Refills: 1 | Status: SHIPPED | OUTPATIENT
Start: 2021-08-30 | End: 2022-08-01

## 2021-10-23 DIAGNOSIS — F41.9 ANXIETY: ICD-10-CM

## 2021-10-25 DIAGNOSIS — K21.9 GASTROESOPHAGEAL REFLUX DISEASE: ICD-10-CM

## 2021-10-25 RX ORDER — OMEPRAZOLE 40 MG/1
CAPSULE, DELAYED RELEASE ORAL
Qty: 90 CAPSULE | Refills: 1 | Status: SHIPPED | OUTPATIENT
Start: 2021-10-25

## 2021-10-25 RX ORDER — BUSPIRONE HYDROCHLORIDE 7.5 MG/1
TABLET ORAL
Qty: 180 TABLET | Refills: 0 | Status: SHIPPED | OUTPATIENT
Start: 2021-10-25 | End: 2022-04-29

## 2021-11-04 DIAGNOSIS — M62.830 BACK SPASM: ICD-10-CM

## 2021-11-04 DIAGNOSIS — M62.838 NECK MUSCLE SPASM: ICD-10-CM

## 2021-11-04 RX ORDER — TIZANIDINE 2 MG/1
TABLET ORAL
Qty: 30 TABLET | Refills: 4 | Status: SHIPPED | OUTPATIENT
Start: 2021-11-04 | End: 2022-04-28

## 2021-11-04 NOTE — TELEPHONE ENCOUNTER
Requested Prescriptions     Pending Prescriptions Disp Refills    tiZANidine (ZANAFLEX) 2 MG tablet 30 tablet 4     Sig: TAKE ONE TABLET BY MOUTH EVERY 8 HOURS AS NEEDED FOR BACK PAIN      Last OV 8/25/2021    Next OV 12/6/2021  Last Labs: 03/02/2020

## 2021-11-06 DIAGNOSIS — J30.9 CHRONIC ALLERGIC RHINITIS: ICD-10-CM

## 2021-11-06 DIAGNOSIS — R05.8 RECURRENT COUGH: ICD-10-CM

## 2021-11-08 RX ORDER — MONTELUKAST SODIUM 10 MG/1
TABLET ORAL
Qty: 90 TABLET | Refills: 4 | Status: SHIPPED | OUTPATIENT
Start: 2021-11-08

## 2021-12-02 DIAGNOSIS — I10 ESSENTIAL HYPERTENSION: ICD-10-CM

## 2021-12-02 RX ORDER — VALSARTAN 160 MG/1
TABLET ORAL
Qty: 90 TABLET | Refills: 1 | Status: SHIPPED | OUTPATIENT
Start: 2021-12-02 | End: 2022-06-08 | Stop reason: SDUPTHER

## 2021-12-06 ENCOUNTER — OFFICE VISIT (OUTPATIENT)
Dept: INTERNAL MEDICINE CLINIC | Age: 61
End: 2021-12-06
Payer: COMMERCIAL

## 2021-12-06 VITALS
SYSTOLIC BLOOD PRESSURE: 149 MMHG | WEIGHT: 167.2 LBS | BODY MASS INDEX: 26.24 KG/M2 | HEIGHT: 67 IN | DIASTOLIC BLOOD PRESSURE: 80 MMHG | OXYGEN SATURATION: 98 % | HEART RATE: 76 BPM

## 2021-12-06 DIAGNOSIS — D49.2 ABNORMAL SKIN GROWTH: ICD-10-CM

## 2021-12-06 DIAGNOSIS — K21.9 GASTROESOPHAGEAL REFLUX DISEASE WITHOUT ESOPHAGITIS: ICD-10-CM

## 2021-12-06 DIAGNOSIS — I65.23 BILATERAL CAROTID ARTERY STENOSIS: Primary | ICD-10-CM

## 2021-12-06 DIAGNOSIS — I10 ESSENTIAL HYPERTENSION: ICD-10-CM

## 2021-12-06 DIAGNOSIS — F41.9 ANXIETY: ICD-10-CM

## 2021-12-06 PROCEDURE — G8419 CALC BMI OUT NRM PARAM NOF/U: HCPCS | Performed by: INTERNAL MEDICINE

## 2021-12-06 PROCEDURE — G8427 DOCREV CUR MEDS BY ELIG CLIN: HCPCS | Performed by: INTERNAL MEDICINE

## 2021-12-06 PROCEDURE — 1036F TOBACCO NON-USER: CPT | Performed by: INTERNAL MEDICINE

## 2021-12-06 PROCEDURE — 3017F COLORECTAL CA SCREEN DOC REV: CPT | Performed by: INTERNAL MEDICINE

## 2021-12-06 PROCEDURE — G8484 FLU IMMUNIZE NO ADMIN: HCPCS | Performed by: INTERNAL MEDICINE

## 2021-12-06 PROCEDURE — 99214 OFFICE O/P EST MOD 30 MIN: CPT | Performed by: INTERNAL MEDICINE

## 2021-12-06 RX ORDER — ATORVASTATIN CALCIUM 20 MG/1
20 TABLET, FILM COATED ORAL DAILY
Qty: 90 TABLET | Refills: 1 | Status: SHIPPED | OUTPATIENT
Start: 2021-12-06 | End: 2022-07-06

## 2021-12-06 RX ORDER — THIAMINE MONONITRATE (VIT B1) 100 MG
100 TABLET ORAL DAILY
Qty: 90 TABLET | Refills: 1 | Status: SHIPPED | OUTPATIENT
Start: 2021-12-06

## 2021-12-06 ASSESSMENT — ENCOUNTER SYMPTOMS
VOMITING: 0
ABDOMINAL PAIN: 0
WHEEZING: 0
NAUSEA: 0
SHORTNESS OF BREATH: 0
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
## 2021-12-20 ENCOUNTER — OFFICE VISIT (OUTPATIENT)
Dept: SURGERY | Age: 61
End: 2021-12-20
Payer: COMMERCIAL

## 2021-12-20 VITALS — SYSTOLIC BLOOD PRESSURE: 142 MMHG | WEIGHT: 165 LBS | BODY MASS INDEX: 25.84 KG/M2 | DIASTOLIC BLOOD PRESSURE: 69 MMHG

## 2021-12-20 DIAGNOSIS — L98.9 SKIN LESION: ICD-10-CM

## 2021-12-20 PROCEDURE — 3017F COLORECTAL CA SCREEN DOC REV: CPT | Performed by: SURGERY

## 2021-12-20 PROCEDURE — G8427 DOCREV CUR MEDS BY ELIG CLIN: HCPCS | Performed by: SURGERY

## 2021-12-20 PROCEDURE — 1036F TOBACCO NON-USER: CPT | Performed by: SURGERY

## 2021-12-20 PROCEDURE — G8484 FLU IMMUNIZE NO ADMIN: HCPCS | Performed by: SURGERY

## 2021-12-20 PROCEDURE — 99203 OFFICE O/P NEW LOW 30 MIN: CPT | Performed by: SURGERY

## 2021-12-20 PROCEDURE — G8419 CALC BMI OUT NRM PARAM NOF/U: HCPCS | Performed by: SURGERY

## 2021-12-20 ASSESSMENT — ENCOUNTER SYMPTOMS
EYE ITCHING: 0
CHEST TIGHTNESS: 0
APNEA: 0
EYE DISCHARGE: 0
COLOR CHANGE: 0
ABDOMINAL DISTENTION: 0
ABDOMINAL PAIN: 0
BACK PAIN: 0

## 2021-12-20 NOTE — Clinical Note
Thank you for sending Ms. Dickens.  Will schedule her for in office procedure for excision of the two skin lesions in the near future at her convenience

## 2021-12-20 NOTE — PATIENT INSTRUCTIONS
1. We discussed the risks of surgery including bleeding, infection, as well as the cost and pain related to the procedure. Benefits of excision include resolution of symptoms and definitive tissue diagnosis. Alternatives include close observation. The patient understands, agrees, and wishes to proceed with excision  2. We also discussed anesthesia options including general anesthesia, local anesthetic only, and local anesthetic with sedation. General anesthesia provides the most patient relaxation but at the cost of higher risk as it includes endotracheal intubation. Local anesthetic poses the least risk to the patient but is the most uncomfortable. Additional benefit of local anesthetic only is that the patient could drive home from the procedure. The patient has chosen local anesthetic only  3.  Will schedule for excision of skin lesions on right medial thigh with local anesthetic only as outpatient procedure to be performed in office
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
## 2021-12-29 ENCOUNTER — HOSPITAL ENCOUNTER (OUTPATIENT)
Dept: VASCULAR LAB | Age: 61
Discharge: HOME | End: 2021-12-29
Payer: COMMERCIAL

## 2021-12-29 DIAGNOSIS — I65.23 BILATERAL CAROTID ARTERY STENOSIS: ICD-10-CM

## 2021-12-29 PROCEDURE — 93880 EXTRACRANIAL BILAT STUDY: CPT

## 2022-01-03 ENCOUNTER — TELEPHONE (OUTPATIENT)
Dept: INTERNAL MEDICINE CLINIC | Age: 62
End: 2022-01-03

## 2022-01-03 DIAGNOSIS — I65.23 BILATERAL CAROTID ARTERY STENOSIS: Primary | ICD-10-CM

## 2022-01-03 NOTE — RESULT ENCOUNTER NOTE
Right internal carotid stenosis slightly worse than last test.  She will need a follow-up Doppler study in 6 month. She also need to see a vascular specialist. Dr. Yao Speaks consult order placed.

## 2022-01-04 ENCOUNTER — TELEPHONE (OUTPATIENT)
Dept: INTERNAL MEDICINE CLINIC | Age: 62
End: 2022-01-04

## 2022-01-04 NOTE — TELEPHONE ENCOUNTER
Pt returning your call ---please call her back at [phone] her you mailed her scan results and referral info but it does take a while for people to get stuff mailed from here---she would like to talk to you please call her at [phone]. Thanks.

## 2022-01-04 NOTE — TELEPHONE ENCOUNTER
Called patient again-unable to leave VM. Information from recent scan and new referral for vascular surgeon faxed to patient on 1/3/22.

## 2022-01-04 NOTE — TELEPHONE ENCOUNTER
Disregard previous note. Corrected note: Called patient again-unable to leave VM. Information from recent scan and new referral for vascular surgeon mailed to patient on 1/3/22.

## 2022-01-04 NOTE — TELEPHONE ENCOUNTER
Was able to speak to patient regarding information from provider and patient states she will make an appt with the specialist per Dr. Katie John recommendation. Patient states she will get her voicemail set up soon.

## 2022-01-07 ENCOUNTER — PROCEDURE VISIT (OUTPATIENT)
Dept: SURGERY | Age: 62
End: 2022-01-07
Payer: COMMERCIAL

## 2022-01-07 VITALS — BODY MASS INDEX: 25.84 KG/M2 | WEIGHT: 165 LBS | DIASTOLIC BLOOD PRESSURE: 68 MMHG | SYSTOLIC BLOOD PRESSURE: 118 MMHG

## 2022-01-07 DIAGNOSIS — L98.9 SKIN LESION: Primary | ICD-10-CM

## 2022-01-07 PROCEDURE — 11401 EXC TR-EXT B9+MARG 0.6-1 CM: CPT | Performed by: SURGERY

## 2022-01-07 PROCEDURE — 11602 EXC TR-EXT MAL+MARG 1.1-2 CM: CPT | Performed by: SURGERY

## 2022-01-07 NOTE — PATIENT INSTRUCTIONS
Keep dressing as dry as possible. Leave dressing in place for 48 hours and then may remove. Replace daily with dry dressing as needed, Reapply antibiotic ointment daily and May shower after dressing is removed.  Avoid scrubbing over incision  Pain control with tylenol and ibuprofen with food as needed and Minimize heavy lifting for 2 weeks  Return to office in 10-14 days for suture removal  Will discuss pathology at return office visit

## 2022-01-13 ENCOUNTER — OFFICE VISIT (OUTPATIENT)
Dept: INTERNAL MEDICINE CLINIC | Age: 62
End: 2022-01-13
Payer: COMMERCIAL

## 2022-01-13 VITALS
WEIGHT: 169.8 LBS | DIASTOLIC BLOOD PRESSURE: 80 MMHG | HEIGHT: 67 IN | SYSTOLIC BLOOD PRESSURE: 138 MMHG | BODY MASS INDEX: 26.65 KG/M2 | HEART RATE: 83 BPM

## 2022-01-13 DIAGNOSIS — L02.212 CUTANEOUS ABSCESS OF BACK EXCLUDING BUTTOCKS: Primary | ICD-10-CM

## 2022-01-13 PROCEDURE — 1036F TOBACCO NON-USER: CPT | Performed by: INTERNAL MEDICINE

## 2022-01-13 PROCEDURE — 99213 OFFICE O/P EST LOW 20 MIN: CPT | Performed by: INTERNAL MEDICINE

## 2022-01-13 PROCEDURE — 3017F COLORECTAL CA SCREEN DOC REV: CPT | Performed by: INTERNAL MEDICINE

## 2022-01-13 PROCEDURE — G8419 CALC BMI OUT NRM PARAM NOF/U: HCPCS | Performed by: INTERNAL MEDICINE

## 2022-01-13 PROCEDURE — G8484 FLU IMMUNIZE NO ADMIN: HCPCS | Performed by: INTERNAL MEDICINE

## 2022-01-13 PROCEDURE — G8427 DOCREV CUR MEDS BY ELIG CLIN: HCPCS | Performed by: INTERNAL MEDICINE

## 2022-01-13 RX ORDER — SULFAMETHOXAZOLE AND TRIMETHOPRIM 800; 160 MG/1; MG/1
1 TABLET ORAL 2 TIMES DAILY
Qty: 14 TABLET | Refills: 0 | Status: SHIPPED | OUTPATIENT
Start: 2022-01-13 | End: 2022-01-20

## 2022-01-13 NOTE — PROGRESS NOTES
ASSESSMENT/PLAN:  1. Cutaneous abscess of back excluding buttocks  Assessment & Plan:   Appears to be partially drained with crusted blood and dry ulcer, localized cellulitis with tenderness to palpation, advised patient will start systemic antibiotics can continue warm compressors, she is scheduled in 1 week to follow-up with general surgery for a different skin lesion, advised if boil in the back still not better by then can have them look at it and see if it needs to be I&D. Educated about alarming signs and symptoms of expanding cellulitis and advised to call the office or seek immediate medical attention should any develop  Orders:  -     sulfamethoxazole-trimethoprim (BACTRIM DS;SEPTRA DS) 800-160 MG per tablet; Take 1 tablet by mouth 2 times daily for 7 days, Disp-14 tablet, R-0Normal      Return if symptoms worsen or fail to improve. SUBJECTIVE  HPI:   Patient complaining of painful skin lesion on the back for the past few days, states she was at the urgent care for respiratory infection and was given Augmentin after testing negative for COVID however this does not seem to have affected the back lesion. That she had similar problems in the same area in the past but usually she will squeeze it and drain it herself and it will heal currently after. This time it is very painful and has not improved. She has been using warm compressors and states the other day had hot shower water run over it for a while. Not noticed any drainage on her clothing and denies any fever or chills      Review of Systems   Constitutional: Negative for activity change, chills and fever. Skin: Positive for wound. OBJECTIVE:    /80   Pulse 83   Ht 5' 7\" (1.702 m)   Wt 169 lb 12.8 oz (77 kg)   BMI 26.59 kg/m²    Physical Exam  Constitutional:       General: She is not in acute distress. Appearance: Normal appearance. She is normal weight. She is not toxic-appearing. HENT:      Head: Normocephalic.    Eyes: Conjunctiva/sclera: Conjunctivae normal.   Cardiovascular:      Rate and Rhythm: Normal rate. Heart sounds: Normal heart sounds. Pulmonary:      Effort: Pulmonary effort is normal. No respiratory distress. Abdominal:      Palpations: Abdomen is soft. Musculoskeletal:         General: Normal range of motion. Skin:     General: Skin is warm and dry. Findings: Erythema and lesion present. Comments: Infected skin boil mid upper abdominal wall towards left side just above bra line with dry crusted blood, very tender to palpation, quarter size induration   Neurological:      General: No focal deficit present. Mental Status: She is alert. Cranial Nerves: No cranial nerve deficit. Psychiatric:         Mood and Affect: Mood normal.           Electronically signed by Libertad Dominguez MD on 1/13/2022 at 2:32 PM.    This dictation was generated by voice recognition computer software. Although all attempts are made to edit the dictation for accuracy, there may be errors in the transcription that are not intended.

## 2022-01-13 NOTE — ASSESSMENT & PLAN NOTE
Appears to be partially drained with crusted blood and dry ulcer, localized cellulitis with tenderness to palpation, advised patient will start systemic antibiotics can continue warm compressors, she is scheduled in 1 week to follow-up with general surgery for a different skin lesion, advised if boil in the back still not better by then can have them look at it and see if it needs to be I&D.   Educated about alarming signs and symptoms of expanding cellulitis and advised to call the office or seek immediate medical attention should any develop

## 2022-01-21 ENCOUNTER — OFFICE VISIT (OUTPATIENT)
Dept: SURGERY | Age: 62
End: 2022-01-21

## 2022-01-21 VITALS — DIASTOLIC BLOOD PRESSURE: 102 MMHG | SYSTOLIC BLOOD PRESSURE: 186 MMHG | BODY MASS INDEX: 25.37 KG/M2 | WEIGHT: 162 LBS

## 2022-01-21 DIAGNOSIS — C44.722 SQUAMOUS CELL CARCINOMA, LEG, RIGHT: ICD-10-CM

## 2022-01-21 DIAGNOSIS — Z48.89 ENCOUNTER FOR POSTOPERATIVE CARE: Primary | ICD-10-CM

## 2022-01-21 PROCEDURE — 99024 POSTOP FOLLOW-UP VISIT: CPT | Performed by: SURGERY
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
## 2022-01-21 NOTE — PATIENT INSTRUCTIONS
1. Local wound care with dry dressing as needed  2. No restrictions with regard to activity, bathing, swimming  3. No restrictions with regard to diet  4. No restrictions with regard to activity  5. Pathology with squamous/basal cell carcinoma, but margins negative. Does not require further excision or treatment  6.  Follow with general surgery as needed

## 2022-01-28 DIAGNOSIS — I49.9 IRREGULAR HEART BEAT: ICD-10-CM

## 2022-01-28 DIAGNOSIS — I10 ESSENTIAL HYPERTENSION: ICD-10-CM

## 2022-01-28 RX ORDER — ALBUTEROL SULFATE 90 UG/1
AEROSOL, METERED RESPIRATORY (INHALATION)
Qty: 18 G | Refills: 4 | Status: SHIPPED | OUTPATIENT
Start: 2022-01-28

## 2022-01-28 RX ORDER — METOPROLOL SUCCINATE 50 MG/1
TABLET, EXTENDED RELEASE ORAL
Qty: 90 TABLET | Refills: 1 | Status: SHIPPED | OUTPATIENT
Start: 2022-01-28 | End: 2022-08-08

## 2022-02-03 DIAGNOSIS — J30.9 CHRONIC ALLERGIC RHINITIS: ICD-10-CM

## 2022-02-04 RX ORDER — FLUTICASONE PROPIONATE 50 MCG
SPRAY, SUSPENSION (ML) NASAL
Qty: 1 EACH | Refills: 2 | Status: SHIPPED | OUTPATIENT
Start: 2022-02-04 | End: 2022-06-15

## 2022-02-16 DIAGNOSIS — F41.9 ANXIETY: ICD-10-CM

## 2022-02-16 DIAGNOSIS — T14.8XXA BRUISE: ICD-10-CM

## 2022-02-16 RX ORDER — ESCITALOPRAM OXALATE 20 MG/1
TABLET ORAL
Qty: 90 TABLET | Refills: 1 | Status: SHIPPED | OUTPATIENT
Start: 2022-02-16 | End: 2022-09-06

## 2022-02-16 RX ORDER — FOLIC ACID 1 MG/1
TABLET ORAL
Qty: 90 TABLET | Refills: 1 | Status: SHIPPED | OUTPATIENT
Start: 2022-02-16 | End: 2022-09-06

## 2022-02-16 RX ORDER — ASCORBIC ACID 500 MG
TABLET ORAL
Qty: 30 TABLET | Refills: 5 | Status: SHIPPED | OUTPATIENT
Start: 2022-02-16 | End: 2022-09-15

## 2022-03-08 ENCOUNTER — OFFICE VISIT (OUTPATIENT)
Dept: INTERNAL MEDICINE CLINIC | Age: 62
End: 2022-03-08
Payer: COMMERCIAL

## 2022-03-08 VITALS
OXYGEN SATURATION: 99 % | WEIGHT: 169 LBS | BODY MASS INDEX: 26.53 KG/M2 | HEIGHT: 67 IN | HEART RATE: 75 BPM | DIASTOLIC BLOOD PRESSURE: 82 MMHG | SYSTOLIC BLOOD PRESSURE: 135 MMHG

## 2022-03-08 DIAGNOSIS — C44.92 SQUAMOUS CELL SKIN CANCER: ICD-10-CM

## 2022-03-08 DIAGNOSIS — Z12.31 ENCOUNTER FOR SCREENING MAMMOGRAM FOR MALIGNANT NEOPLASM OF BREAST: ICD-10-CM

## 2022-03-08 DIAGNOSIS — I10 ESSENTIAL HYPERTENSION: ICD-10-CM

## 2022-03-08 DIAGNOSIS — J43.9 PULMONARY EMPHYSEMA, UNSPECIFIED EMPHYSEMA TYPE (HCC): Primary | ICD-10-CM

## 2022-03-08 DIAGNOSIS — E78.49 OTHER HYPERLIPIDEMIA: ICD-10-CM

## 2022-03-08 LAB
ALBUMIN SERPL-MCNC: 5.1 G/DL (ref 3.4–5)
ALP BLD-CCNC: 149 U/L (ref 40–129)
ALT SERPL-CCNC: 17 U/L (ref 10–40)
ANION GAP SERPL CALCULATED.3IONS-SCNC: 14 MMOL/L (ref 3–16)
AST SERPL-CCNC: 29 U/L (ref 15–37)
BILIRUB SERPL-MCNC: 1 MG/DL (ref 0–1)
BILIRUBIN DIRECT: 0.3 MG/DL (ref 0–0.3)
BILIRUBIN, INDIRECT: 0.7 MG/DL (ref 0–1)
BUN BLDV-MCNC: 8 MG/DL (ref 7–20)
CALCIUM SERPL-MCNC: 9.9 MG/DL (ref 8.3–10.6)
CHLORIDE BLD-SCNC: 91 MMOL/L (ref 99–110)
CHOLESTEROL, FASTING: 179 MG/DL (ref 0–199)
CO2: 24 MMOL/L (ref 21–32)
CREAT SERPL-MCNC: 0.6 MG/DL (ref 0.6–1.2)
GFR AFRICAN AMERICAN: >60
GFR NON-AFRICAN AMERICAN: >60
GLUCOSE BLD-MCNC: 117 MG/DL (ref 70–99)
HDLC SERPL-MCNC: 74 MG/DL (ref 40–60)
LDL CHOLESTEROL CALCULATED: 92 MG/DL
POTASSIUM SERPL-SCNC: 4.8 MMOL/L (ref 3.5–5.1)
SODIUM BLD-SCNC: 129 MMOL/L (ref 136–145)
TOTAL PROTEIN: 7.4 G/DL (ref 6.4–8.2)
TRIGLYCERIDE, FASTING: 67 MG/DL (ref 0–150)
VLDLC SERPL CALC-MCNC: 13 MG/DL

## 2022-03-08 PROCEDURE — 3017F COLORECTAL CA SCREEN DOC REV: CPT | Performed by: INTERNAL MEDICINE

## 2022-03-08 PROCEDURE — G8419 CALC BMI OUT NRM PARAM NOF/U: HCPCS | Performed by: INTERNAL MEDICINE

## 2022-03-08 PROCEDURE — G8482 FLU IMMUNIZE ORDER/ADMIN: HCPCS | Performed by: INTERNAL MEDICINE

## 2022-03-08 PROCEDURE — G8427 DOCREV CUR MEDS BY ELIG CLIN: HCPCS | Performed by: INTERNAL MEDICINE

## 2022-03-08 PROCEDURE — 99214 OFFICE O/P EST MOD 30 MIN: CPT | Performed by: INTERNAL MEDICINE

## 2022-03-08 PROCEDURE — 1036F TOBACCO NON-USER: CPT | Performed by: INTERNAL MEDICINE

## 2022-03-08 PROCEDURE — 3023F SPIROM DOC REV: CPT | Performed by: INTERNAL MEDICINE

## 2022-03-08 ASSESSMENT — PATIENT HEALTH QUESTIONNAIRE - PHQ9
2. FEELING DOWN, DEPRESSED OR HOPELESS: 0
SUM OF ALL RESPONSES TO PHQ QUESTIONS 1-9: 0
SUM OF ALL RESPONSES TO PHQ QUESTIONS 1-9: 0
1. LITTLE INTEREST OR PLEASURE IN DOING THINGS: 0
SUM OF ALL RESPONSES TO PHQ9 QUESTIONS 1 & 2: 0
SUM OF ALL RESPONSES TO PHQ QUESTIONS 1-9: 0
SUM OF ALL RESPONSES TO PHQ QUESTIONS 1-9: 0

## 2022-03-08 ASSESSMENT — ENCOUNTER SYMPTOMS
WHEEZING: 0
NAUSEA: 0
ABDOMINAL PAIN: 0
CHEST TIGHTNESS: 0
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
## 2022-03-09 DIAGNOSIS — R79.89 LOW SERUM SODIUM: ICD-10-CM

## 2022-03-09 DIAGNOSIS — R74.8 ELEVATED ALKALINE PHOSPHATASE LEVEL: Primary | ICD-10-CM

## 2022-03-09 NOTE — RESULT ENCOUNTER NOTE
Patient continues to have low sodium, which has been chronic for years  Avoid alcohol. May take extra table salt  Slightly elevated alkaline phosphatase. Need further testing.   Order placed

## 2022-03-23 ENCOUNTER — OFFICE VISIT (OUTPATIENT)
Dept: VASCULAR SURGERY | Age: 62
End: 2022-03-23
Payer: COMMERCIAL

## 2022-03-23 VITALS
WEIGHT: 171 LBS | DIASTOLIC BLOOD PRESSURE: 84 MMHG | BODY MASS INDEX: 26.84 KG/M2 | SYSTOLIC BLOOD PRESSURE: 140 MMHG | HEIGHT: 67 IN

## 2022-03-23 DIAGNOSIS — I65.23 CAROTID ATHEROSCLEROSIS, BILATERAL: Primary | ICD-10-CM

## 2022-03-23 PROCEDURE — G8427 DOCREV CUR MEDS BY ELIG CLIN: HCPCS | Performed by: SURGERY

## 2022-03-23 PROCEDURE — 1036F TOBACCO NON-USER: CPT | Performed by: SURGERY

## 2022-03-23 PROCEDURE — G8482 FLU IMMUNIZE ORDER/ADMIN: HCPCS | Performed by: SURGERY

## 2022-03-23 PROCEDURE — G8419 CALC BMI OUT NRM PARAM NOF/U: HCPCS | Performed by: SURGERY

## 2022-03-23 PROCEDURE — 99203 OFFICE O/P NEW LOW 30 MIN: CPT | Performed by: SURGERY

## 2022-03-23 PROCEDURE — 3017F COLORECTAL CA SCREEN DOC REV: CPT | Performed by: SURGERY

## 2022-03-23 NOTE — LETTER
[facility]) - Vascular and Endovascular Surgeons  [address] [address]  Phone: [phone]  Fax: [phone]           Caity Fields MD      March 23, 2022     Patient: Ro Spencer   MR Number: [id number]   YOB: 1960   Date of Visit: 3/23/2022       Dear Dr. Liane Bran:    Thank you for referring Ene Mc to me for evaluation/treatment. Below are the relevant portions of my assessment and plan of care. If you have questions, please do not hesitate to call me. I look forward to following Law Alford along with you.     Sincerely,        Caity Fields MD    CC providers:  Kosta Downing MD  [address] [id number]  [place]
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
## 2022-03-31 ENCOUNTER — APPOINTMENT (RX ONLY)
Dept: URBAN - METROPOLITAN AREA CLINIC 170 | Facility: CLINIC | Age: 62
Setting detail: DERMATOLOGY
End: 2022-03-31

## 2022-03-31 DIAGNOSIS — L72.8 OTHER FOLLICULAR CYSTS OF THE SKIN AND SUBCUTANEOUS TISSUE: ICD-10-CM | Status: STABLE

## 2022-03-31 DIAGNOSIS — D22 MELANOCYTIC NEVI: ICD-10-CM | Status: STABLE

## 2022-03-31 DIAGNOSIS — Z85.828 PERSONAL HISTORY OF OTHER MALIGNANT NEOPLASM OF SKIN: ICD-10-CM | Status: STABLE

## 2022-03-31 DIAGNOSIS — L81.4 OTHER MELANIN HYPERPIGMENTATION: ICD-10-CM | Status: STABLE

## 2022-03-31 DIAGNOSIS — D18.0 HEMANGIOMA: ICD-10-CM | Status: STABLE

## 2022-03-31 DIAGNOSIS — L57.0 ACTINIC KERATOSIS: ICD-10-CM

## 2022-03-31 DIAGNOSIS — L82.1 OTHER SEBORRHEIC KERATOSIS: ICD-10-CM | Status: STABLE

## 2022-03-31 PROBLEM — D18.01 HEMANGIOMA OF SKIN AND SUBCUTANEOUS TISSUE: Status: ACTIVE | Noted: 2022-03-31

## 2022-03-31 PROBLEM — D22.5 MELANOCYTIC NEVI OF TRUNK: Status: ACTIVE | Noted: 2022-03-31

## 2022-03-31 PROCEDURE — ? TREATMENT REGIMEN

## 2022-03-31 PROCEDURE — ? DEFER

## 2022-03-31 PROCEDURE — ? FULL BODY SKIN EXAM

## 2022-03-31 PROCEDURE — ? SUNSCREEN RECOMMENDATIONS

## 2022-03-31 PROCEDURE — ? COUNSELING

## 2022-03-31 PROCEDURE — 99203 OFFICE O/P NEW LOW 30 MIN: CPT

## 2022-03-31 PROCEDURE — ? ADDITIONAL NOTES

## 2022-03-31 ASSESSMENT — LOCATION ZONE DERM
LOCATION ZONE: TRUNK
LOCATION ZONE: ARM
LOCATION ZONE: FACE

## 2022-03-31 ASSESSMENT — LOCATION SIMPLE DESCRIPTION DERM
LOCATION SIMPLE: LEFT UPPER BACK
LOCATION SIMPLE: RIGHT FOREHEAD
LOCATION SIMPLE: UPPER BACK
LOCATION SIMPLE: RIGHT SHOULDER
LOCATION SIMPLE: ABDOMEN
LOCATION SIMPLE: RIGHT ZYGOMA

## 2022-03-31 ASSESSMENT — LOCATION DETAILED DESCRIPTION DERM
LOCATION DETAILED: SUPERIOR THORACIC SPINE
LOCATION DETAILED: RIGHT FOREHEAD
LOCATION DETAILED: LEFT SUPERIOR MEDIAL UPPER BACK
LOCATION DETAILED: LEFT INFERIOR UPPER BACK
LOCATION DETAILED: RIGHT POSTERIOR SHOULDER
LOCATION DETAILED: RIGHT CENTRAL ZYGOMA
LOCATION DETAILED: RIGHT ANTERIOR SHOULDER
LOCATION DETAILED: PERIUMBILICAL SKIN

## 2022-03-31 NOTE — HPI: EVALUATION OF SKIN LESION(S)
What Type Of Note Output Would You Prefer (Optional)?: Standard Output
Hpi Title: Evaluation of Skin Lesions
How Severe Are Your Spot(S)?: mild
Have Your Spot(S) Been Treated In The Past?: has not been treated
Additional History: Patient states she has a few new spots on her leg and chest
2

## 2022-03-31 NOTE — PROCEDURE: MIPS QUALITY
Quality 110: Preventive Care And Screening: Influenza Immunization: Influenza immunization was not ordered or administered, reason not given
Quality 226: Preventive Care And Screening: Tobacco Use: Screening And Cessation Intervention: Patient screened for tobacco use and is an ex/non-smoker
Detail Level: Detailed
Quality 474: Zoster Vaccination Status: Shingrix Vaccination not Administered or Previously Received, Reason not Otherwise Specified
Quality 130: Documentation Of Current Medications In The Medical Record: Current Medications Documented
Quality 111:Pneumonia Vaccination Status For Older Adults: Pneumococcal Vaccination not Administered or Previously Received, Reason not Otherwise Specified
Quality 431: Preventive Care And Screening: Unhealthy Alcohol Use - Screening: Patient screened for unhealthy alcohol use using a single question and scores less than 2 times per year

## 2022-04-28 DIAGNOSIS — M62.838 NECK MUSCLE SPASM: ICD-10-CM

## 2022-04-28 DIAGNOSIS — M62.830 BACK SPASM: ICD-10-CM

## 2022-04-28 DIAGNOSIS — F41.9 ANXIETY: ICD-10-CM

## 2022-04-28 RX ORDER — TIZANIDINE 2 MG/1
TABLET ORAL
Qty: 30 TABLET | Refills: 4 | Status: SHIPPED | OUTPATIENT
Start: 2022-04-28 | End: 2022-09-21

## 2022-04-29 RX ORDER — BUSPIRONE HYDROCHLORIDE 7.5 MG/1
TABLET ORAL
Qty: 180 TABLET | Refills: 0 | Status: SHIPPED | OUTPATIENT
Start: 2022-04-29

## 2022-04-30 ENCOUNTER — HOSPITAL ENCOUNTER (OUTPATIENT)
Dept: WOMENS IMAGING | Age: 62
Discharge: HOME | End: 2022-04-30
Payer: COMMERCIAL

## 2022-04-30 VITALS — WEIGHT: 160 LBS | BODY MASS INDEX: 25.11 KG/M2 | HEIGHT: 67 IN

## 2022-04-30 DIAGNOSIS — Z12.31 ENCOUNTER FOR SCREENING MAMMOGRAM FOR MALIGNANT NEOPLASM OF BREAST: ICD-10-CM

## 2022-04-30 PROCEDURE — 77067 SCR MAMMO BI INCL CAD: CPT

## 2022-05-10 ENCOUNTER — APPOINTMENT (RX ONLY)
Dept: URBAN - METROPOLITAN AREA CLINIC 170 | Facility: CLINIC | Age: 62
Setting detail: DERMATOLOGY
End: 2022-05-10

## 2022-05-10 DIAGNOSIS — L57.0 ACTINIC KERATOSIS: ICD-10-CM

## 2022-05-10 PROCEDURE — 17003 DESTRUCT PREMALG LES 2-14: CPT

## 2022-05-10 PROCEDURE — ? ADDITIONAL NOTES

## 2022-05-10 PROCEDURE — ? LIQUID NITROGEN

## 2022-05-10 PROCEDURE — ? FULL BODY SKIN EXAM - DECLINED

## 2022-05-10 PROCEDURE — 17000 DESTRUCT PREMALG LESION: CPT

## 2022-05-10 ASSESSMENT — LOCATION DETAILED DESCRIPTION DERM
LOCATION DETAILED: RIGHT CENTRAL ZYGOMA
LOCATION DETAILED: RIGHT CENTRAL TEMPLE

## 2022-05-10 ASSESSMENT — LOCATION ZONE DERM: LOCATION ZONE: FACE

## 2022-05-10 ASSESSMENT — LOCATION SIMPLE DESCRIPTION DERM
LOCATION SIMPLE: RIGHT TEMPLE
LOCATION SIMPLE: RIGHT ZYGOMA

## 2022-05-10 NOTE — PROCEDURE: MIPS QUALITY
Quality 226: Preventive Care And Screening: Tobacco Use: Screening And Cessation Intervention: Patient screened for tobacco use and is an ex/non-smoker
Quality 474: Zoster Vaccination Status: Shingrix Vaccination not Administered or Previously Received, Reason not Otherwise Specified
Quality 431: Preventive Care And Screening: Unhealthy Alcohol Use - Screening: Patient screened for unhealthy alcohol use using a single question and scores less than 2 times per year
Detail Level: Detailed
Quality 130: Documentation Of Current Medications In The Medical Record: Current Medications Documented
Quality 431: Preventive Care And Screening: Unhealthy Alcohol Use - Screening: Patient not identified as an unhealthy alcohol user when screened for unhealthy alcohol use using a systematic screening method

## 2022-06-08 ENCOUNTER — OFFICE VISIT (OUTPATIENT)
Dept: INTERNAL MEDICINE CLINIC | Age: 62
End: 2022-06-08
Payer: COMMERCIAL

## 2022-06-08 VITALS
DIASTOLIC BLOOD PRESSURE: 82 MMHG | HEIGHT: 67 IN | WEIGHT: 163 LBS | SYSTOLIC BLOOD PRESSURE: 150 MMHG | BODY MASS INDEX: 25.58 KG/M2 | HEART RATE: 85 BPM | OXYGEN SATURATION: 98 %

## 2022-06-08 DIAGNOSIS — R79.89 LOW SERUM SODIUM: ICD-10-CM

## 2022-06-08 DIAGNOSIS — F41.9 ANXIETY: ICD-10-CM

## 2022-06-08 DIAGNOSIS — L84 CALLUS OF FOOT: ICD-10-CM

## 2022-06-08 DIAGNOSIS — B37.2 CANDIDA ONYCHOMYCOSIS: Primary | ICD-10-CM

## 2022-06-08 DIAGNOSIS — R74.8 ELEVATED ALKALINE PHOSPHATASE LEVEL: ICD-10-CM

## 2022-06-08 DIAGNOSIS — I10 ESSENTIAL HYPERTENSION: ICD-10-CM

## 2022-06-08 DIAGNOSIS — J43.9 PULMONARY EMPHYSEMA, UNSPECIFIED EMPHYSEMA TYPE (HCC): ICD-10-CM

## 2022-06-08 LAB
MAGNESIUM: 2 MG/DL (ref 1.8–2.4)
VITAMIN D 25-HYDROXY: 47.5 NG/ML

## 2022-06-08 PROCEDURE — 3017F COLORECTAL CA SCREEN DOC REV: CPT | Performed by: INTERNAL MEDICINE

## 2022-06-08 PROCEDURE — G8419 CALC BMI OUT NRM PARAM NOF/U: HCPCS | Performed by: INTERNAL MEDICINE

## 2022-06-08 PROCEDURE — G8427 DOCREV CUR MEDS BY ELIG CLIN: HCPCS | Performed by: INTERNAL MEDICINE

## 2022-06-08 PROCEDURE — 3023F SPIROM DOC REV: CPT | Performed by: INTERNAL MEDICINE

## 2022-06-08 PROCEDURE — 1036F TOBACCO NON-USER: CPT | Performed by: INTERNAL MEDICINE

## 2022-06-08 PROCEDURE — 99214 OFFICE O/P EST MOD 30 MIN: CPT | Performed by: INTERNAL MEDICINE

## 2022-06-08 RX ORDER — VALSARTAN 320 MG/1
320 TABLET ORAL DAILY
Qty: 90 TABLET | Refills: 3 | Status: SHIPPED | OUTPATIENT
Start: 2022-06-08

## 2022-06-08 ASSESSMENT — ENCOUNTER SYMPTOMS
VOICE CHANGE: 0
SHORTNESS OF BREATH: 1
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
## 2022-06-11 LAB
ALK PHOS OTHER CALC: 0 U/L
ALK PHOSPHATASE: 160 U/L (ref 40–120)
ALKALINE PHOSPHATASE BONE FRACTION: 40 U/L (ref 0–55)
ALKALINE PHOSPHATASE LIVER FRACTION: 120 U/L (ref 0–94)

## 2022-06-12 DIAGNOSIS — R74.8 ELEVATED ALKALINE PHOSPHATASE LEVEL: ICD-10-CM

## 2022-06-12 DIAGNOSIS — R74.8 ELEVATED LIVER ENZYMES: Primary | ICD-10-CM

## 2022-06-12 NOTE — RESULT ENCOUNTER NOTE
Elevated alkaline phosphatase this is coming from the liver. Gallbladder ultrasound, also need to see a hepatologist.  Referral order placed.

## 2022-06-15 DIAGNOSIS — J30.9 CHRONIC ALLERGIC RHINITIS: ICD-10-CM

## 2022-06-15 RX ORDER — FLUTICASONE PROPIONATE 50 MCG
SPRAY, SUSPENSION (ML) NASAL
Qty: 1 EACH | Refills: 5 | Status: SHIPPED | OUTPATIENT
Start: 2022-06-15

## 2022-07-05 DIAGNOSIS — I65.23 BILATERAL CAROTID ARTERY STENOSIS: ICD-10-CM

## 2022-07-06 RX ORDER — ATORVASTATIN CALCIUM 20 MG/1
TABLET, FILM COATED ORAL
Qty: 90 TABLET | Refills: 1 | Status: SHIPPED | OUTPATIENT
Start: 2022-07-06

## 2022-07-29 DIAGNOSIS — M54.50 ACUTE BILATERAL LOW BACK PAIN WITHOUT SCIATICA: ICD-10-CM

## 2022-07-29 DIAGNOSIS — B35.3 TINEA PEDIS OF BOTH FEET: ICD-10-CM

## 2022-08-01 RX ORDER — CLOTRIMAZOLE 1 %
CREAM (GRAM) TOPICAL
Qty: 60 G | Refills: 1 | Status: SHIPPED | OUTPATIENT
Start: 2022-08-01

## 2022-08-01 RX ORDER — NAPROXEN 500 MG/1
TABLET ORAL
Qty: 60 TABLET | Refills: 2 | Status: SHIPPED | OUTPATIENT
Start: 2022-08-01

## 2022-08-08 DIAGNOSIS — I10 ESSENTIAL HYPERTENSION: ICD-10-CM

## 2022-08-08 DIAGNOSIS — I49.9 IRREGULAR HEART BEAT: ICD-10-CM

## 2022-08-08 RX ORDER — METOPROLOL SUCCINATE 50 MG/1
TABLET, EXTENDED RELEASE ORAL
Qty: 90 TABLET | Refills: 1 | Status: SHIPPED | OUTPATIENT
Start: 2022-08-08

## 2022-08-08 NOTE — TELEPHONE ENCOUNTER
Medication:   Requested Prescriptions     Pending Prescriptions Disp Refills    metoprolol succinate (TOPROL XL) 50 MG extended release tablet [Pharmacy Med Name: METOPROLOL SUCC ER 50 MG TAB] 90 tablet 1     Sig: TAKE ONE TABLET BY MOUTH DAILY       Last Filled:  1/28/22    Patient Phone Number: [phone] (home)     Last appt: 6/8/2022   Next appt: 9/7/2022

## 2022-08-11 ENCOUNTER — HOSPITAL ENCOUNTER (OUTPATIENT)
Dept: ULTRASOUND IMAGING | Age: 62
Discharge: HOME | End: 2022-08-11
Payer: COMMERCIAL

## 2022-08-11 DIAGNOSIS — R74.8 ELEVATED LIVER ENZYMES: ICD-10-CM

## 2022-08-11 DIAGNOSIS — R74.8 ELEVATED ALKALINE PHOSPHATASE LEVEL: ICD-10-CM

## 2022-08-11 PROCEDURE — 76705 ECHO EXAM OF ABDOMEN: CPT

## 2022-08-12 DIAGNOSIS — K76.0 FATTY LIVER: Primary | ICD-10-CM

## 2022-08-12 NOTE — RESULT ENCOUNTER NOTE
Elevated liver enzymes alkaline phosphatase also have fatty liver. I recommend hepatologist consult. Order placed in chart in June.

## 2022-08-31 ENCOUNTER — HOSPITAL ENCOUNTER (OUTPATIENT)
Age: 62
Discharge: HOME | End: 2022-08-31
Payer: COMMERCIAL

## 2022-08-31 PROCEDURE — 83516 IMMUNOASSAY NONANTIBODY: CPT

## 2022-09-02 DIAGNOSIS — F41.9 ANXIETY: ICD-10-CM

## 2022-09-05 LAB — MITOCHONDRIAL M2 AB, IGG: 1.1 U/ML (ref 0–4)

## 2022-09-06 RX ORDER — FOLIC ACID 1 MG/1
TABLET ORAL
Qty: 90 TABLET | Refills: 1 | Status: SHIPPED | OUTPATIENT
Start: 2022-09-06

## 2022-09-06 RX ORDER — ESCITALOPRAM OXALATE 20 MG/1
TABLET ORAL
Qty: 90 TABLET | Refills: 1 | Status: SHIPPED | OUTPATIENT
Start: 2022-09-06

## 2022-09-15 DIAGNOSIS — T14.8XXA BRUISE: ICD-10-CM

## 2022-09-15 RX ORDER — ASCORBIC ACID 500 MG
TABLET ORAL
Qty: 30 TABLET | Refills: 1 | Status: SHIPPED | OUTPATIENT
Start: 2022-09-15

## 2022-09-21 DIAGNOSIS — M62.838 NECK MUSCLE SPASM: ICD-10-CM

## 2022-09-21 DIAGNOSIS — M62.830 BACK SPASM: ICD-10-CM

## 2022-09-21 RX ORDER — TIZANIDINE 2 MG/1
TABLET ORAL
Qty: 30 TABLET | Refills: 5 | Status: SHIPPED | OUTPATIENT
Start: 2022-09-21

## 2022-10-04 ENCOUNTER — OFFICE VISIT (OUTPATIENT)
Dept: VASCULAR SURGERY | Age: 62
End: 2022-10-04
Payer: COMMERCIAL

## 2022-10-04 ENCOUNTER — PROCEDURE VISIT (OUTPATIENT)
Dept: VASCULAR SURGERY | Age: 62
End: 2022-10-04

## 2022-10-04 VITALS
BODY MASS INDEX: 24.33 KG/M2 | SYSTOLIC BLOOD PRESSURE: 170 MMHG | WEIGHT: 155 LBS | HEIGHT: 67 IN | DIASTOLIC BLOOD PRESSURE: 80 MMHG

## 2022-10-04 DIAGNOSIS — I65.23 CAROTID ATHEROSCLEROSIS, BILATERAL: Primary | ICD-10-CM

## 2022-10-04 DIAGNOSIS — I65.23 CAROTID ATHEROSCLEROSIS, BILATERAL: ICD-10-CM

## 2022-10-04 PROCEDURE — 99213 OFFICE O/P EST LOW 20 MIN: CPT | Performed by: SURGERY

## 2022-10-04 PROCEDURE — 3017F COLORECTAL CA SCREEN DOC REV: CPT | Performed by: SURGERY

## 2022-10-04 PROCEDURE — G8420 CALC BMI NORM PARAMETERS: HCPCS | Performed by: SURGERY

## 2022-10-04 PROCEDURE — G8484 FLU IMMUNIZE NO ADMIN: HCPCS | Performed by: SURGERY

## 2022-10-04 PROCEDURE — G8427 DOCREV CUR MEDS BY ELIG CLIN: HCPCS | Performed by: SURGERY

## 2022-10-04 PROCEDURE — 1036F TOBACCO NON-USER: CPT | Performed by: SURGERY

## 2022-10-04 NOTE — LETTER
[address] [facility]  [address] [facility] [address]  Phone: [phone]  Fax: [phone]    Denton Holloway MD    October 4, 2022     Deedee Xavier MD  [address] [address]    Patient: Ondina Yanez   MR Number: [id number]   YOB: 1960   Date of Visit: 10/4/2022       Dear Samantha Salvador:    Thank you for referring Shraddha Jones to me for evaluation/treatment. Below are the relevant portions of my assessment and plan of care. If you have questions, please do not hesitate to call me. I look forward to following Minor Herrera along with you.     Sincerely,      Denton Holloway MD

## 2022-10-04 NOTE — PROGRESS NOTES
[facility])   Vascular Surgery Followup    Referring Provider:  Casie Waller MD     No chief complaint on file. History of Present Illness:  58-year-old female in today for follow-up of carotid atherosclerosis. She presents today for routine carotid duplex scan imaging and to discuss results. She denies TIA stroke or amaurosis. Denies claudication. Past Medical History:   has a past medical history of Alcohol problem drinking, Alcohol withdrawal delirium ([facility] [address] Anxiety, Facial bones, closed fracture ([facility] [address] Hypertension, Hyponatremia, Malignant hypertension, and Panic attacks. Surgical History:   has a past surgical history that includes Neck surgery (2000); Tubal ligation; Wrist ganglion excision (1988); other surgical history; Colonoscopy (11/16/2018); and Colonoscopy (N/A, 11/16/2018). Social History:   reports that she quit smoking about 4 years ago. Her smoking use included cigarettes. She started smoking about 43 years ago. She has a 19.00 pack-year smoking history. She has never used smokeless tobacco. She reports current drug use. Drug: Marijuana [employer]). She reports that she does not drink alcohol. Family History:  family history includes Asthma in her father; Breast Cancer in her maternal aunt and maternal grandmother; Cancer in her father; Heart Disease in her father; Hypertension in her father; Kidney Disease in her mother; Migraines in her mother; Rheum Arthritis in her mother.      Home Medications:  Current Outpatient Medications   Medication Sig Dispense Refill    tiZANidine (ZANAFLEX) 2 MG tablet TAKE ONE TABLET BY MOUTH EVERY 8 HOURS AS NEEDED FOR BACK PAIN 30 tablet 5    ascorbic acid (VITAMIN C) 500 MG tablet TAKE ONE TABLET BY MOUTH DAILY 30 tablet 1    folic acid (FOLVITE) 1 MG tablet TAKE ONE TABLET BY MOUTH DAILY 90 tablet 1    escitalopram (LEXAPRO) 20 MG tablet TAKE ONE TABLET BY MOUTH DAILY 90 tablet 1    metoprolol succinate (TOPROL XL) 50 MG extended release tablet TAKE ONE TABLET BY MOUTH DAILY 90 tablet 1    clotrimazole (LOTRIMIN) 1 % cream APPLY TOPICALLY TWO TIMES A DAY FOR 4 WEEKS 60 g 1    naproxen (NAPROSYN) 500 MG tablet TAKE ONE TABLET BY MOUTH TWICE A DAY AS NEEDED FOR ARTHRITIS 60 tablet 2    atorvastatin (LIPITOR) 20 MG tablet TAKE ONE TABLET BY MOUTH DAILY 90 tablet 1    fluticasone (FLONASE) 50 MCG/ACT nasal spray SPRAY TWO SPRAYS IN EACH NOSTRIL ONCE DAILY 1 each 5    glycopyrrolate-formoterol (BEVESPI) 9-4.8 MCG/ACT AERO Inhale 2 puffs into the lungs 2 times daily 10.7 g 5    valsartan (DIOVAN) 320 MG tablet Take 1 tablet by mouth daily TAKE ONE TABLET BY MOUTH DAILY 90 tablet 3    busPIRone (BUSPAR) 7.5 MG tablet TAKE ONE TABLET BY MOUTH TWICE A DAY AS NEEDED FOR SLEEP 180 tablet 0    albuterol sulfate  (90 Base) MCG/ACT inhaler INHALE TWO PUFFS BY MOUTH EVERY 6 HOURS AS NEEDED FOR WHEEZING 18 g 4    vitamin B-1 (THIAMINE) 100 MG tablet Take 1 tablet by mouth daily 90 tablet 1    montelukast (SINGULAIR) 10 MG tablet TAKE ONE TABLET BY MOUTH DAILY 90 tablet 4    omeprazole (PRILOSEC) 40 MG delayed release capsule TAKE ONE CAPSULE BY MOUTH EVERY MORNING BEFORE BREAKFAST 90 capsule 1    fluocinonide (LIDEX) 0.05 % cream Apply topically 2 times daily. 30 g 1    aspirin 81 MG tablet Take 81 mg by mouth daily        No current facility-administered medications for this visit. Allergies:  Epinephrine, Novocain [procaine], and Procaine hcl     Review of Systems:   Constitutional: there has been no unanticipated weight loss. There's been no change in energy level, sleep pattern, or activity level. Eyes: No visual changes or diplopia. No scleral icterus. ENT: No Headaches, hearing loss or vertigo. No mouth sores or sore throat. Cardiovascular: Reviewed in HPI  Respiratory: No cough or wheezing, no sputum production. No hematemesis. Gastrointestinal: No abdominal pain, appetite loss, blood in stools.  No change in bowel or bladder habits. Genitourinary: No dysuria, trouble voiding, or hematuria. Musculoskeletal:  No gait disturbance, weakness or joint complaints. Integumentary: No rash or pruritis. Neurological: No headache, diplopia, change in muscle strength, numbness or tingling. No change in gait, balance, coordination, mood, affect, memory, mentation, behavior. Psychiatric: No anxiety, no depression. Endocrine: No malaise, fatigue or temperature intolerance. No excessive thirst, fluid intake, or urination. No tremor. Hematologic/Lymphatic: No abnormal bruising or bleeding, blood clots or swollen lymph nodes. Allergic/Immunologic: No nasal congestion or hives. Physical Examination:    There were no vitals filed for this visit. General appearance: alert, appears stated age, cooperative, and no distress  Head: Normocephalic, without obvious abnormality, atraumatic  Neck: no adenopathy, no carotid bruit, no JVD, supple, symmetrical, trachea midline, and thyroid: not enlarged, symmetric, no tenderness/mass/nodules  Lungs: clear to auscultation bilaterally  Heart: regular rate and rhythm, S1, S2 normal, no murmur, click, rub or gallop  Abdomen: soft, non-tender. Bowel sounds normal. No masses,  no organomegaly  Extremities: extremities normal, atraumatic, no cyanosis or edema  Neurologic: Grossly normal    MEDICAL DECISION MAKING/TESTING  I have reviewed the testing personally and my interpretation is below.     Bilateral 50 to 69% internal carotid artery stenosis    Assessment:     Patient Active Problem List   Diagnosis    Degenerative joint disease of cervical and lumbar spine    Anxiety    Essential hypertension    Hyperlipidemia    History of alcohol abuse    History of drug overdose    Tobacco use    Gastroesophageal reflux disease    Seasonal allergic rhinitis due to pollen    Adjustment disorder with anxious mood    Alcohol dependence in remission ([facility] [address]    Pulmonary emphysema (HCC)    Alcohol use disorder, mild, in early remission    Neck muscle spasm    Chronic allergic rhinitis    Adenomatous polyp of sigmoid colon    Lumbar paraspinal muscle spasm    Actinic keratosis    Asymptomatic stenosis of left carotid artery    Mild persistent asthma, uncomplicated    Hiatal hernia    Skin lesion    Cutaneous abscess of back excluding buttocks    Squamous cell carcinoma, leg, right    Elevated alkaline phosphatase level    Fatty liver       Plan:  1. Carotid atherosclerosis, bilateral  65-year-old female with stable asymptomatic bilateral 50 to 69% internal carotid artery stenosis. Continue current medical regimen. We will plan for repeat carotid duplex imaging in 6 months  - VL DUP CAROTID BILATERAL; Future        Thank you for allowing me to participate in the care of this individual.  Please do not hesitate to contact me with any questions. Davey Yanez M.D., FACS.   10/4/2022  8:51 AM

## 2022-10-28 ENCOUNTER — OFFICE VISIT (OUTPATIENT)
Dept: INTERNAL MEDICINE CLINIC | Age: 62
End: 2022-10-28
Payer: COMMERCIAL

## 2022-10-28 VITALS
HEART RATE: 69 BPM | BODY MASS INDEX: 24.17 KG/M2 | HEIGHT: 67 IN | SYSTOLIC BLOOD PRESSURE: 172 MMHG | OXYGEN SATURATION: 99 % | WEIGHT: 154 LBS | DIASTOLIC BLOOD PRESSURE: 90 MMHG

## 2022-10-28 DIAGNOSIS — I73.00 RAYNAUD'S PHENOMENON WITHOUT GANGRENE: ICD-10-CM

## 2022-10-28 DIAGNOSIS — M47.812 DEGENERATIVE JOINT DISEASE OF CERVICAL AND LUMBAR SPINE: ICD-10-CM

## 2022-10-28 DIAGNOSIS — M47.816 DEGENERATIVE JOINT DISEASE OF CERVICAL AND LUMBAR SPINE: ICD-10-CM

## 2022-10-28 DIAGNOSIS — I10 ESSENTIAL HYPERTENSION: ICD-10-CM

## 2022-10-28 DIAGNOSIS — Z23 NEED FOR PNEUMOCOCCAL VACCINATION: ICD-10-CM

## 2022-10-28 DIAGNOSIS — M25.50 ARTHRALGIA OF MULTIPLE JOINTS: ICD-10-CM

## 2022-10-28 DIAGNOSIS — Z23 NEED FOR INFLUENZA VACCINATION: ICD-10-CM

## 2022-10-28 DIAGNOSIS — E87.1 LOW SODIUM LEVELS: ICD-10-CM

## 2022-10-28 DIAGNOSIS — I10 ESSENTIAL HYPERTENSION: Primary | ICD-10-CM

## 2022-10-28 LAB
ANION GAP SERPL CALCULATED.3IONS-SCNC: 13 MMOL/L (ref 3–16)
BILIRUBIN URINE: NEGATIVE
BLOOD, URINE: NEGATIVE
BUN BLDV-MCNC: 12 MG/DL (ref 7–20)
C-REACTIVE PROTEIN: <3 MG/L (ref 0–5.1)
CALCIUM SERPL-MCNC: 9.6 MG/DL (ref 8.3–10.6)
CHLORIDE BLD-SCNC: 90 MMOL/L (ref 99–110)
CLARITY: CLEAR
CO2: 25 MMOL/L (ref 21–32)
COLOR: YELLOW
CREAT SERPL-MCNC: 0.6 MG/DL (ref 0.6–1.2)
GFR SERPL CREATININE-BSD FRML MDRD: >60 ML/MIN/{1.73_M2}
GLUCOSE BLD-MCNC: 110 MG/DL (ref 70–99)
GLUCOSE URINE: NEGATIVE MG/DL
HCT VFR BLD CALC: 42 % (ref 36–48)
HEMOGLOBIN: 14.1 G/DL (ref 12–16)
KETONES, URINE: NEGATIVE MG/DL
LEUKOCYTE ESTERASE, URINE: NEGATIVE
MAGNESIUM: 2 MG/DL (ref 1.8–2.4)
MCH RBC QN AUTO: 33.4 PG (ref 26–34)
MCHC RBC AUTO-ENTMCNC: 33.5 G/DL (ref 31–36)
MCV RBC AUTO: 99.6 FL (ref 80–100)
MICROSCOPIC EXAMINATION: NORMAL
NITRITE, URINE: NEGATIVE
PDW BLD-RTO: 13.2 % (ref 12.4–15.4)
PH UA: 7 (ref 5–8)
PLATELET # BLD: 179 K/UL (ref 135–450)
PMV BLD AUTO: 8.7 FL (ref 5–10.5)
POTASSIUM SERPL-SCNC: 4.8 MMOL/L (ref 3.5–5.1)
PROTEIN UA: NEGATIVE MG/DL
RBC # BLD: 4.22 M/UL (ref 4–5.2)
RHEUMATOID FACTOR: <10 IU/ML
SEDIMENTATION RATE, ERYTHROCYTE: 8 MM/HR (ref 0–30)
SODIUM BLD-SCNC: 128 MMOL/L (ref 136–145)
SPECIFIC GRAVITY UA: 1.01 (ref 1–1.03)
URINE TYPE: NORMAL
UROBILINOGEN, URINE: 0.2 E.U./DL
WBC # BLD: 5.3 K/UL (ref 4–11)

## 2022-10-28 PROCEDURE — G8482 FLU IMMUNIZE ORDER/ADMIN: HCPCS | Performed by: INTERNAL MEDICINE

## 2022-10-28 PROCEDURE — 1036F TOBACCO NON-USER: CPT | Performed by: INTERNAL MEDICINE

## 2022-10-28 PROCEDURE — G8420 CALC BMI NORM PARAMETERS: HCPCS | Performed by: INTERNAL MEDICINE

## 2022-10-28 PROCEDURE — 90674 CCIIV4 VAC NO PRSV 0.5 ML IM: CPT | Performed by: INTERNAL MEDICINE

## 2022-10-28 PROCEDURE — 90677 PCV20 VACCINE IM: CPT | Performed by: INTERNAL MEDICINE

## 2022-10-28 PROCEDURE — 3017F COLORECTAL CA SCREEN DOC REV: CPT | Performed by: INTERNAL MEDICINE

## 2022-10-28 PROCEDURE — G8427 DOCREV CUR MEDS BY ELIG CLIN: HCPCS | Performed by: INTERNAL MEDICINE

## 2022-10-28 PROCEDURE — 3078F DIAST BP <80 MM HG: CPT | Performed by: INTERNAL MEDICINE

## 2022-10-28 PROCEDURE — 90471 IMMUNIZATION ADMIN: CPT | Performed by: INTERNAL MEDICINE

## 2022-10-28 PROCEDURE — 90472 IMMUNIZATION ADMIN EACH ADD: CPT | Performed by: INTERNAL MEDICINE

## 2022-10-28 PROCEDURE — 99214 OFFICE O/P EST MOD 30 MIN: CPT | Performed by: INTERNAL MEDICINE

## 2022-10-28 PROCEDURE — 3074F SYST BP LT 130 MM HG: CPT | Performed by: INTERNAL MEDICINE

## 2022-10-28 RX ORDER — NIFEDIPINE 30 MG/1
30 TABLET, EXTENDED RELEASE ORAL DAILY
Qty: 90 TABLET | Refills: 1 | Status: SHIPPED | OUTPATIENT
Start: 2022-10-28

## 2022-10-28 SDOH — ECONOMIC STABILITY: FOOD INSECURITY: WITHIN THE PAST 12 MONTHS, THE FOOD YOU BOUGHT JUST DIDN'T LAST AND YOU DIDN'T HAVE MONEY TO GET MORE.: NEVER TRUE

## 2022-10-28 SDOH — ECONOMIC STABILITY: FOOD INSECURITY: WITHIN THE PAST 12 MONTHS, YOU WORRIED THAT YOUR FOOD WOULD RUN OUT BEFORE YOU GOT MONEY TO BUY MORE.: NEVER TRUE

## 2022-10-28 ASSESSMENT — ENCOUNTER SYMPTOMS
VOICE CHANGE: 0
TROUBLE SWALLOWING: 0
ABDOMINAL PAIN: 0
VOMITING: 0
SHORTNESS OF BREATH: 0
NAUSEA: 0
PHOTOPHOBIA: 0
WHEEZING: 0

## 2022-10-28 ASSESSMENT — SOCIAL DETERMINANTS OF HEALTH (SDOH): HOW HARD IS IT FOR YOU TO PAY FOR THE VERY BASICS LIKE FOOD, HOUSING, MEDICAL CARE, AND HEATING?: NOT HARD AT ALL
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
## 2022-10-29 LAB
ANTI-NUCLEAR ANTIBODY (ANA): NEGATIVE
CYCLIC CITRULLINATED PEPTIDE ANTIBODY IGG: <0.5 U/ML (ref 0–2.9)

## 2022-10-30 DIAGNOSIS — E87.1 CHRONIC HYPONATREMIA: Primary | ICD-10-CM

## 2022-10-30 NOTE — RESULT ENCOUNTER NOTE
Persistent hyponatremia. I recommend patient get evaluation from nephrologist.  Order placed.   Other labs are stable

## 2022-11-01 LAB — MISCELLANEOUS LAB TEST ORDER: NORMAL

## 2022-11-09 DIAGNOSIS — F41.9 ANXIETY: ICD-10-CM

## 2022-11-10 RX ORDER — BUSPIRONE HYDROCHLORIDE 7.5 MG/1
TABLET ORAL
Qty: 60 TABLET | Refills: 3 | Status: SHIPPED | OUTPATIENT
Start: 2022-11-10

## 2022-11-21 ENCOUNTER — TELEPHONE (OUTPATIENT)
Dept: INTERNAL MEDICINE CLINIC | Age: 62
End: 2022-11-21

## 2022-11-21 NOTE — TELEPHONE ENCOUNTER
----- Message from Brandi Joaquin sent at 11/18/2022  1:51 PM EST -----  Subject: Message to Provider    QUESTIONS  Information for Provider? April from [facility] says she made   several attempts to contact the patient and even mailed a letter. The   patient has up to a year to get scheduled but she will have to call. Please advise  ---------------------------------------------------------------------------  --------------  CALL BACK INFO  [phone]; OK to leave message on voicemail  ---------------------------------------------------------------------------  --------------  SCRIPT ANSWERS  Relationship to Patient? Third Party  Third Party Type? Other  Other Third Party Type? [facility]. Representative Name?  April

## 2022-12-03 DIAGNOSIS — R05.8 RECURRENT COUGH: ICD-10-CM

## 2022-12-03 DIAGNOSIS — T14.8XXA BRUISE: ICD-10-CM

## 2022-12-03 DIAGNOSIS — J30.9 CHRONIC ALLERGIC RHINITIS: ICD-10-CM

## 2022-12-05 RX ORDER — ASCORBIC ACID 500 MG
TABLET ORAL
Qty: 90 TABLET | Refills: 1 | Status: SHIPPED | OUTPATIENT
Start: 2022-12-05

## 2022-12-05 RX ORDER — MONTELUKAST SODIUM 10 MG/1
TABLET ORAL
Qty: 90 TABLET | Refills: 1 | Status: SHIPPED | OUTPATIENT
Start: 2022-12-05

## 2022-12-14 RX ORDER — LANOLIN ALCOHOL/MO/W.PET/CERES
CREAM (GRAM) TOPICAL
Qty: 30 TABLET | Refills: 5 | Status: SHIPPED | OUTPATIENT
Start: 2022-12-14

## 2023-01-08 DIAGNOSIS — I65.23 BILATERAL CAROTID ARTERY STENOSIS: ICD-10-CM

## 2023-01-09 RX ORDER — ATORVASTATIN CALCIUM 20 MG/1
TABLET, FILM COATED ORAL
Qty: 90 TABLET | Refills: 1 | Status: SHIPPED | OUTPATIENT
Start: 2023-01-09

## 2023-01-29 DIAGNOSIS — M54.50 ACUTE BILATERAL LOW BACK PAIN WITHOUT SCIATICA: ICD-10-CM

## 2023-01-30 RX ORDER — NAPROXEN 500 MG/1
TABLET ORAL
Qty: 60 TABLET | Refills: 5 | Status: ON HOLD
Start: 2023-01-30 | End: 2023-03-28 | Stop reason: HOSPADM

## 2023-02-10 ENCOUNTER — OFFICE VISIT (OUTPATIENT)
Dept: INTERNAL MEDICINE CLINIC | Age: 63
End: 2023-02-10
Payer: COMMERCIAL

## 2023-02-10 VITALS
BODY MASS INDEX: 23.09 KG/M2 | SYSTOLIC BLOOD PRESSURE: 122 MMHG | DIASTOLIC BLOOD PRESSURE: 76 MMHG | WEIGHT: 147.4 LBS | HEART RATE: 60 BPM

## 2023-02-10 DIAGNOSIS — M25.552 LEFT HIP PAIN: Primary | ICD-10-CM

## 2023-02-10 DIAGNOSIS — J43.9 PULMONARY EMPHYSEMA, UNSPECIFIED EMPHYSEMA TYPE (HCC): ICD-10-CM

## 2023-02-10 DIAGNOSIS — I10 ESSENTIAL HYPERTENSION: ICD-10-CM

## 2023-02-10 DIAGNOSIS — M47.816 DEGENERATIVE JOINT DISEASE OF CERVICAL AND LUMBAR SPINE: ICD-10-CM

## 2023-02-10 DIAGNOSIS — M47.812 DEGENERATIVE JOINT DISEASE OF CERVICAL AND LUMBAR SPINE: ICD-10-CM

## 2023-02-10 PROCEDURE — 99214 OFFICE O/P EST MOD 30 MIN: CPT | Performed by: INTERNAL MEDICINE

## 2023-02-10 PROCEDURE — 3023F SPIROM DOC REV: CPT | Performed by: INTERNAL MEDICINE

## 2023-02-10 PROCEDURE — G8482 FLU IMMUNIZE ORDER/ADMIN: HCPCS | Performed by: INTERNAL MEDICINE

## 2023-02-10 PROCEDURE — G8420 CALC BMI NORM PARAMETERS: HCPCS | Performed by: INTERNAL MEDICINE

## 2023-02-10 PROCEDURE — 3074F SYST BP LT 130 MM HG: CPT | Performed by: INTERNAL MEDICINE

## 2023-02-10 PROCEDURE — G8427 DOCREV CUR MEDS BY ELIG CLIN: HCPCS | Performed by: INTERNAL MEDICINE

## 2023-02-10 PROCEDURE — 3078F DIAST BP <80 MM HG: CPT | Performed by: INTERNAL MEDICINE

## 2023-02-10 PROCEDURE — 96372 THER/PROPH/DIAG INJ SC/IM: CPT | Performed by: INTERNAL MEDICINE

## 2023-02-10 PROCEDURE — 3017F COLORECTAL CA SCREEN DOC REV: CPT | Performed by: INTERNAL MEDICINE

## 2023-02-10 PROCEDURE — 1036F TOBACCO NON-USER: CPT | Performed by: INTERNAL MEDICINE

## 2023-02-10 RX ORDER — TIOTROPIUM BROMIDE 18 UG/1
18 CAPSULE ORAL; RESPIRATORY (INHALATION) DAILY
Qty: 90 CAPSULE | Refills: 1 | Status: SHIPPED | OUTPATIENT
Start: 2023-02-10

## 2023-02-10 RX ORDER — KETOROLAC TROMETHAMINE 30 MG/ML
60 INJECTION, SOLUTION INTRAMUSCULAR; INTRAVENOUS ONCE
Status: DISCONTINUED | OUTPATIENT
Start: 2023-02-10 | End: 2023-02-10

## 2023-02-10 RX ORDER — PREDNISONE 20 MG/1
20 TABLET ORAL 2 TIMES DAILY
Qty: 10 TABLET | Refills: 0 | Status: SHIPPED | OUTPATIENT
Start: 2023-02-10 | End: 2023-02-15

## 2023-02-10 RX ORDER — KETOROLAC TROMETHAMINE 30 MG/ML
30 INJECTION, SOLUTION INTRAMUSCULAR; INTRAVENOUS ONCE
Status: COMPLETED | OUTPATIENT
Start: 2023-02-10 | End: 2023-02-10

## 2023-02-10 RX ADMIN — KETOROLAC TROMETHAMINE 30 MG: 30 INJECTION, SOLUTION INTRAMUSCULAR; INTRAVENOUS at 09:37

## 2023-02-10 ASSESSMENT — ENCOUNTER SYMPTOMS
PHOTOPHOBIA: 0
NAUSEA: 0
CHEST TIGHTNESS: 0
ABDOMINAL PAIN: 0
VOMITING: 0
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
## 2023-02-11 ENCOUNTER — HOSPITAL ENCOUNTER (OUTPATIENT)
Dept: GENERAL RADIOLOGY | Age: 63
Discharge: HOME | End: 2023-02-11
Payer: COMMERCIAL

## 2023-02-11 ENCOUNTER — HOSPITAL ENCOUNTER (OUTPATIENT)
Age: 63
Discharge: HOME | End: 2023-02-11
Payer: COMMERCIAL

## 2023-02-11 DIAGNOSIS — M25.552 LEFT HIP PAIN: ICD-10-CM

## 2023-02-11 PROCEDURE — 73502 X-RAY EXAM HIP UNI 2-3 VIEWS: CPT

## 2023-02-13 DIAGNOSIS — M47.812 DEGENERATIVE JOINT DISEASE OF CERVICAL AND LUMBAR SPINE: ICD-10-CM

## 2023-02-13 DIAGNOSIS — M54.50 ACUTE BILATERAL LOW BACK PAIN WITHOUT SCIATICA: ICD-10-CM

## 2023-02-13 DIAGNOSIS — M47.816 DEGENERATIVE JOINT DISEASE OF CERVICAL AND LUMBAR SPINE: ICD-10-CM

## 2023-02-13 DIAGNOSIS — M25.552 LEFT HIP PAIN: Primary | ICD-10-CM

## 2023-02-20 ENCOUNTER — OFFICE VISIT (OUTPATIENT)
Dept: ORTHOPEDIC SURGERY | Age: 63
End: 2023-02-20
Payer: COMMERCIAL

## 2023-02-20 VITALS — BODY MASS INDEX: 24.08 KG/M2 | WEIGHT: 153.4 LBS | HEIGHT: 67 IN

## 2023-02-20 DIAGNOSIS — M16.12 ARTHRITIS OF LEFT HIP: Primary | ICD-10-CM

## 2023-02-20 DIAGNOSIS — Z01.818 PRE-OP TESTING: ICD-10-CM

## 2023-02-20 PROCEDURE — 1036F TOBACCO NON-USER: CPT | Performed by: ORTHOPAEDIC SURGERY

## 2023-02-20 PROCEDURE — 3017F COLORECTAL CA SCREEN DOC REV: CPT | Performed by: ORTHOPAEDIC SURGERY

## 2023-02-20 PROCEDURE — G8482 FLU IMMUNIZE ORDER/ADMIN: HCPCS | Performed by: ORTHOPAEDIC SURGERY

## 2023-02-20 PROCEDURE — G8420 CALC BMI NORM PARAMETERS: HCPCS | Performed by: ORTHOPAEDIC SURGERY

## 2023-02-20 PROCEDURE — G8427 DOCREV CUR MEDS BY ELIG CLIN: HCPCS | Performed by: ORTHOPAEDIC SURGERY

## 2023-02-20 RX ORDER — TRAMADOL HYDROCHLORIDE 50 MG/1
50 TABLET ORAL EVERY 8 HOURS PRN
Qty: 21 TABLET | Refills: 0 | Status: SHIPPED | OUTPATIENT
Start: 2023-02-20 | End: 2023-02-27

## 2023-02-20 NOTE — PROGRESS NOTES
Patient: Capri Jones  : 1960    MRN: [id number]    Date of Visit: 23    Attending Physician: Amairani Palacios    History of Present Illness  Ms. Marilee Mane is a very pleasant 60 y.o. patient with a several year history of progressive LEFT hip pain. There is no precipitating event or trauma. The pain is located predominantly in the groin and aggravated by weight bearing. Walking even short distances can be painful. However, it can also awaken the patient at night. Significant loss in range of motion making it difficult to cross legs and don socks/shoes. The patient has tried the following interventions without sustained functional improvement:    Structured exercise/physical therapy program  NSAIDs and or tylenol   Cane in the contralateral hand    Quality of life is negatively impacted with daily tasks being more difficult. The patient would like to talk about surgical treatment options.       PMH/PSH:  Past Medical History:   Diagnosis Date    Alcohol problem drinking     Alcohol withdrawal delirium ([address] 2017    Anxiety     Facial bones, closed fracture ([address] 2017    Hypertension     Hyponatremia     Malignant hypertension 3/17/2016    Panic attacks      Patient Active Problem List   Diagnosis    Degenerative joint disease of cervical and lumbar spine    Anxiety    Essential hypertension    Hyperlipidemia    History of alcohol abuse    History of drug overdose    Tobacco use    Gastroesophageal reflux disease    Seasonal allergic rhinitis due to pollen    Adjustment disorder with anxious mood    Alcohol dependence in remission ([address]    Pulmonary emphysema (HCC)    Alcohol use disorder, mild, in early remission    Neck muscle spasm    Chronic allergic rhinitis    Adenomatous polyp of sigmoid colon    Lumbar paraspinal muscle spasm    Actinic keratosis    Asymptomatic stenosis of left carotid artery    Mild persistent asthma, uncomplicated    Hiatal hernia    Skin lesion Cutaneous abscess of back excluding buttocks    Squamous cell carcinoma, leg, right    Elevated alkaline phosphatase level    Fatty liver     Past Surgical History:   Procedure Laterality Date    COLONOSCOPY  11/16/2018    next colonoscopy in 2028, 10 years    COLONOSCOPY N/A 11/16/2018    COLONOSCOPY POLYPECTOMY SNARE/COLD BIOPSY performed by Chel Andrea MD at [address] [place] [address]      Right carotid artery surgery    TUBAL LIGATION      WRIST GANGLION EXCISION  [place]       MEDS:  Scheduled Meds:  Continuous Infusions:  PRN Meds:  Current Meds:No current outpatient medications on file. ALLERGIES:  No Known Allergies      Social History:   Social History     Socioeconomic History    Marital status: Single     Spouse name: Not on file    Number of children: Not on file    Years of education: Not on file    Highest education level: Not on file   Social Needs    Financial resource strain: Not on file    Food insecurity - worry: Not on file    Food insecurity - inability: Not on file    Transportation needs - medical: Not on file    Transportation needs - non-medical: Not on file   Occupational History    Not on file   Tobacco Use    Smoking status: Never Smoker    Smokeless tobacco: Never Used   Substance and Sexual Activity    Alcohol use: No     Frequency: Never    Drug use: No    Sexual activity: Not on file   Other Topics Concern    Not on file   Social History Narrative    Not on file         Family History:   No family history on file. Review of Systems:  No personal history of DVT, PE. 12 point ROS otherwise negative other than reported in HPI. Physical Examination:  Patient is alert and oriented x 3 and appears well nourished and appropriate for today's visit. Gait: The patient walks with antalgic gait.   Hips: Apparent leg lengths are equal.   There is tenderness over greater trochanteric region and pain with log roll on the LEFT  ROM is limited by pain LEFT. (at 90 degrees of flexion)   Right- IR: 10, ER 45;  Left- IR:10, ER 45     Skin: Skin appears to be intact in both upper and lower extremities. There does not appear to be any ulceration or other non-healing wounds. Radiographs: AP/lateral hip and pelvis: Imaging was reviewed with the patient. There are severe degenerative changes of the LEFT hip with joint space narrowing, subchondral sclerosis, and osteophyte formation. There is no radiographic evidence of AVN, fracture, or dislocation. Elective LEFT LUIS    Assessment and Plan?: The patient has functionally disabling hip pain with advanced degenerative changes of the LEFT hip     We discussed the diagnosis and treatment options in detail with the patient. Patient has tried conservative treatment including NSAID's, physical therapy, use of cane, injections. Given the failure of these conservative measures the patient is a good candidate for an elective total hip arthroplasty  We discussed the surgical procedure, recovery period, and protocol for pain management, expected post-operative course in detail. We discussed the potential benefits of the surgery including pain relief and improved range of motion as well as the inherent risks including but are not limited to incomplete resolution of symptoms, infection, dislocation, leg length inequality requiring shoe lift, fracture, implant loosening, hardware failure, nerve or vascular injury, need for further surgery, deep vein thrombus, pulmonary embolism. The patient has verbalized understanding of these risks and wishes to proceed. The patient will need to obtain pre-operative clearance from PCP. She would like to talk to her daughter about scheduling and timing and she will give us a call back at which time we will schedule surgery.     ?___________________________   Amairani Palacios MD  ?     cc: Dipak Cyr MD

## 2023-02-20 NOTE — LETTER
[address]    Pre-operative Procedure History and Physical            [facility] Fax:  [phone]    Patient Name:   Haven Hubbard    :    1960  Date of Surgery: 2023    Surgeon:  Dr. Harry Cabrera MD  Chief Complaint:  Left Total Hip Replacement    Medical History:  Hypertension____   Hyperlipidemia____   Diabetes Mellitus____   Coronary Artery Disease____  ________________________________________________________________  ________________________________________________________________  ________________________________________________________________  Surgical History:  ________________________________________________________________  ________________________________________________________________  ________________________________________________________________  Family History:  Anesthesia problems_____   Bleeding problems_____    Clotting problems_____  Other:___________________________________________________________  ROS:____________________________________________________________  ________________________________________________________________  Medications:______________________________________________________  ________________________________________________________________  ________________________________________________________________  Allergies: ________________________________________________________________  ________________________________________________________________    Physical Exam:  BP__________  Pulse_______   Temp________  Resp________  Height_________     Weight_________lbs    BMI____________  General:       Lymph:  HEENT:       Skin:  Neck:        GYN:  CV:  Pulmonary:  Abdomen:  Extremities:  Neuro:  Impression:    Plan:    Perioperative Beta-Blocker therapy should be initialed in at risk patients undergoing major abdominal or vascular procedures, joint replacement, or major chest procedures.   At risk patients are those with established CAD, DM, or who have two or more risk factors--age > 65, smoker, HTN, or hyperlipidemia.    Recommendations:   If on beta-blocker already, continue current drug and titrate to target   If not on beta-blocker, recommend Atenolol or Metoprolol and titrate to target   Target systolic BP < 845, Pulse < 70   Please begin therapy prior to surgery and titrate to goal   Beta-Blocker should be continued for at least 30 days post-operatively as tolerated  Prescription written and therapy instituted  The patient would benefit from beta-blocker therapy, but their use is contraindicated (i.e. reactive airway disease exacerbated by bets-blockers, 2nd or 3rd degree heart block, severe aortic stenosis, other _______________________________________  The patient is to continue their current beta-blocker therapy    Physician Signature ___________________________________________     Date ____________

## 2023-02-20 NOTE — LETTER
Outpatient surgery with Dr. Gabby Soto, [address] [facility], [address] [facility]    Name:  Gina Thompson    Surgery Date: March 28th, 2023    Surgery time: 7:30AM   Arrival time: 6:00AM    Your pre-op appointment with Dr. Helio Diego to surgery is scheduled on: 03/24/2023  Time:  8:45 AM  ___ [address] [place] [address]  _XXX__ [address] [place] [address]    Your post appointment in the office with Dr. Ramon Pham is scheduled on:  04/13/2023  Time:   11:15 AM  _XXX__ [address] [place] [address]  ___ [address] [place] [address]    Do not eat or drink anything after midnight the night before your surgery, including water. 1)  Stop (5) days before surgery: Aspirin, Fish Oil, Vitamin E, and supplements containing higher doses of Ginseng, Ginko Biloba, Garlic supplement. Warfarin (Coumadin), Plavix, Eliquis, Lovenox, Effient, Pradaxa, Xarelto, Fragmin or other blood thinners please check with your prescribing physician before stopping this medication and follow their instructions. Tylenol is ok. Blood pressure medications ending in -pril or -sylwia must be stopped the night before surgery. 2) Pre-admission testing: The anesthesia department will determine if you need additional pre-admission testing and will make arrangements with you to do it. The phone number to preadmission testing at [facility] is [phone] should you need to contact them. The nurses will also advise you on how to take your everyday medications the day of surgery. 3) History and Physical: You will need to visit your primary care physician within 30 days of surgery to obtain a history and physical.      4) Prior Authorization: [facility] will obtain prior authorization for the procedure. If there are any issues we will notify you prior to your surgery date.     5) Transportation: You MUST make arrangements for a responsible adult (18 and older) to take you home after surgery. You will not be allowed to leave alone or drive yourself home. 6) Clothing: Wear loose comfortable clothing that is easy to put on and take off.      7) Crutches/Sling: If applicable to your surgery, please bring crutches or a walker. If you need crutches the hospital can setup a pair. You will be contacted by [facility] for the fitting of your sling at the hospital.     8) Illness: Notify the office if you develop any of the following prior to surgery: Cough, fever, sore throat, nausea, vomiting, dizziness, shortness of breath or blurred vision. 9) Labs: You will need to have labs drawn 2 days prior to your pre-op appointment. Labs must be completed at [address] [place] [address] LAB. The lab is located at [address] [facility]. [facility], [address] [facility]. The lab is open Monday-Friday 7:00am-4:30pm & Saturdays from 7:00am-11:30am. Closed on Holidays & Sundays. Labs must be completed at least TWO days PRIOR to pre-op appointment. No appointment required, walk in anytime. 10) Prescriptions:  Tiffany Cornelius will come around in the post-op area to offer to fill your prescriptions before you are discharged home. You may take the prescriptions to your own pharmacy. NO post-op prescriptions for surgery will be written prior to the procedure date. 11) Post-op physical therapy-: You will be doing physical therapy after surgery. Patients generally start physical therapy BEFORE their 1st post op appointment with Dr. Donovan Bai. If you will be attending physical therapy outside of [facility], please contact the facility you will be attending to arrange your appointments. Let them know you will bring your therapy prescription to your 1st appointment. 12) Forms: (disability, FMLA, etc.) will be completed in 7-10 business days by the medical records department. Please have the patient section filled out and signed. Please do not bring forms to the hospital the day of your procedure to be completed.  Forms must be faxed to medical records at [phone] or dropped off in our offices. The contact number for medical records is [phone]. Please contact our office by phone at [phone] or fax [phone] with any questions.

## 2023-02-20 NOTE — LETTER
ORTHOPAEDIC TOTAL JOINT PREOPERATIVE PHYSICIAN ORDERS    Zainab Kya   Date of Surgery: March 28th, 2023    History & Physcial:  [ ] By Surgeon   By PCP Humberto ]  Referrals:  [ ] Medical/Cardiac Clearance by _____________________________  Acaeus.Maryjaneich ] Joint Replacement Class  [ ] Crutch Walking Instructions   Weight Bearing Status _____________  [ ] Smoking Cessation Instructions  [ ] Other ________________________________________________    Pre Admission Testing:  [ X ] Comprehensive Metabolic Panel [  ] Type & Screen  Acaeus.Ehrich ] CBC without differential         [  ] Type & Crossmatch 2 units  Acaeus.Ehrich ] PT/INR                                 [  ] Autologous Blood _____ units  [  ] aPTT                                           [  ] EKG  [  ] Urinalysis with reflex C&S    Acaeus.Ehrich ] A1C   [ X] Basic Metabolic Panel                Acaeus.Ehrich ] MRSA-nasal  [ X ] Vitamin D Level    Orders to be initiated upon admission to same day surgery:  [X] Fasting blood sugar by fingerstick [ ] Michele Smithfield  [ ] PT/INR (pt takes Warafin/Coumadin)     [ ] Interscalene Right or Left  [ ] HCG ____ Urine _____ Serum              [ ] Femoral Right or Left  [ ] Other ______________________________________________      IV Fluid and Medications:  1. Place IV catheter for IV access. The RN may use 0.1 ml of 1% Lidocaine SQ per site for IV starts up to a maximum of 0.5 mL    2. Infuse Lactated Ringers IV at 50 m/hr. Diabetic or renally impaired, infuse 0.9% Normal Saline at 50 ml/hour    3. Cefazolin 2g IV OR 3 g if >120 kg, given within one hour of incision time. If allergic to Penicillin or Cephalosporins, give within two hours of incision time Vancomycin 1 Gram IV if less than or equal to 80 kg OR 1.5 Grams if  kg OR 2 Grams If > 120 kg. If allergic to Penicillin or Cephalosporins and Vancomycin give Clindamycin 900mg within 1 hour of incision.       4. If + MRSA nasal swab give Vancomycin 1 Gram IV if less than or equal to 80 kg OR 1.5 Grams if  kg OR 2   Grams If > 120 kg    5. Other Medications: Tranexamic Acid 1g IV [ ] Pre-op only [XX] Pre-Op and Intra-op  X Ortho mix      Decadron 10mg IV Preop unless patient is diabetic     Oxycodone ER 20mg for patients less than 72years old preop oral x 1                Oxycodone ER 10mg for patients 65 years or older preop oral x 1    Additional:  __________________________________________________________________________________________    Additional Orders:  1.  [XX}  Knee high or  [ ]  Thigh high anti-embolism stockings and antithrombic compression pumps (apply in Pre-op)                                                                                                                                                                         Physican Signature:  Briana Ricardo MD  Date: 2/24/2023 Time: 10:52 AM

## 2023-02-20 NOTE — LETTER
[facility]. Stephan Townsend      Dear Patient: Vijaya Escobar    As a valued customer, we would like to thank you for choosing [facility] for your upcoming surgery/procedure. CHECKLIST FOR SURGERY:    _____ Get Lab work & Tests done at [facility] the week of _03/13/2023_    _____ Scan QR code and watch joint education video    _____   Visit/Speak with Michelle Bowman Prior to surgery (They will call to schedule)    _____   Schedule a History & physical exam by your primary care physician within 30 days of your surgery date.      _____   Wash with soap provided starting 4 days prior to surgery. Last time you wash with soap is the morning of surgery. (see instruction sheet in folder)    _____   Nothing to eat or drink after midnight the night before surgery. _____   No aspirin products or blood thinners for one week prior to surgery. Includes all NSAIDs such as ibuprofen, aleve, diclofenac, etc.    _____ Medical clearance by a cardiologist/pulmonologist/other specialists depending on your circumstances. _____ Inform office of any insurance changes or change in phone numbers. _____ Provide a copy of your advanced directive/durable power of /living will on the day of your surgery/procedure. ** You will receive at least two calls from the Hospital.  One will be from Registration to pre-register you and go over your address, phone number, and insurance information. You will also hear from the Pre-Admission testing nurses. They will want to get a brief medical history and also go over your list of medications.     If you have any further questions please feel free to contact me at [phone]    Sincerely,    Roberta Muniz, Medical Assistant, Surgery Scheduler for Dr. Brittany Goldman

## 2023-02-20 NOTE — LETTER
[facility] [phone] F: [phone]  Surgery Scheduling Form:  DEMOGRAPHICS:                                                                                                              .  Patient Name:  Shayy Chiu    Patient :  1960   Patient SS#:  [id number]      Patient Phone:  [phone] (home)      Patient Address:  [address] [address]    Insurance:  [employer] [address]:                                                                                                Diagnosis:  Left hip arthritis M16.11    Operation:  Left total hip replacement - direct anterior, CPT 95471    Location:  [facility]    Surgeon:  Ramírez Aldridge    SCHEDULING INFORMATION:                                                                                         .    Requested Date: 2023   OR Time: 7:30AM   Patient Arrival Time: 6:00AM    OR Time Required:  90 minutes    Anesthesia:  Spinal or Choice  SA Required:  Yes x 2    Equipment:  Dale  Anterior approach - HANA table with large C-arm  Posterior approach - Deborah Savin, HIP  positioner    Status:  outpatient   PAT Required:  Yes      Latex Allergy:  no Defibrilator or Pacemaker:  no    Isolation Precautions:  no                      Ramírez Aldridge MD     23   BILLING INFORMATION:                                                                                                    .                          CPT Code Modifier  Total hip    Prior auth:  [address] [place] [address]

## 2023-02-24 ENCOUNTER — TELEPHONE (OUTPATIENT)
Dept: ORTHOPEDIC SURGERY | Age: 63
End: 2023-02-24

## 2023-02-24 DIAGNOSIS — J30.9 CHRONIC ALLERGIC RHINITIS: ICD-10-CM

## 2023-02-24 RX ORDER — FLUTICASONE PROPIONATE 50 MCG
SPRAY, SUSPENSION (ML) NASAL
Qty: 1 EACH | Refills: 2 | Status: SHIPPED | OUTPATIENT
Start: 2023-02-24

## 2023-02-24 NOTE — TELEPHONE ENCOUNTER
Surgery and/or Procedure Scheduling     Contact Name: Cori Palmer Request: L HIP  Patient Contact Number: [phone]

## 2023-02-24 NOTE — TELEPHONE ENCOUNTER
Spoke with patient this morning and was able to schedule her for surgery for the left total hip replacement for 03/28/2023. Patient is notified and will come on Monday 02/27 to  her folder with all instructions .

## 2023-02-24 NOTE — TELEPHONE ENCOUNTER
Future Appointments    Encounter Information    Provider Department Appt Notes   5/10/2023 Shakeel Zhu MD [facility] Internal Medicine Return in about 3 months (around 5/10/2023).      Past Visits    Date Provider Specialty Visit Type Primary Dx   02/10/2023 Shakeel Zhu MD Internal Medicine Office Visit Left hip pain   10/28/2022 Shakeel Zhu MD Internal Medicine Office Visit Essential hypertension

## 2023-02-28 ENCOUNTER — OFFICE VISIT (OUTPATIENT)
Dept: INTERNAL MEDICINE CLINIC | Age: 63
End: 2023-02-28

## 2023-02-28 ENCOUNTER — TELEPHONE (OUTPATIENT)
Dept: CARDIOLOGY CLINIC | Age: 63
End: 2023-02-28

## 2023-02-28 ENCOUNTER — TELEPHONE (OUTPATIENT)
Dept: INTERNAL MEDICINE CLINIC | Age: 63
End: 2023-02-28

## 2023-02-28 ENCOUNTER — APPOINTMENT (RX ONLY)
Dept: URBAN - METROPOLITAN AREA CLINIC 170 | Facility: CLINIC | Age: 63
Setting detail: DERMATOLOGY
End: 2023-02-28

## 2023-02-28 VITALS
WEIGHT: 149 LBS | DIASTOLIC BLOOD PRESSURE: 56 MMHG | HEIGHT: 67 IN | BODY MASS INDEX: 23.39 KG/M2 | HEART RATE: 138 BPM | SYSTOLIC BLOOD PRESSURE: 104 MMHG

## 2023-02-28 DIAGNOSIS — L82.1 OTHER SEBORRHEIC KERATOSIS: ICD-10-CM | Status: STABLE

## 2023-02-28 DIAGNOSIS — I10 ESSENTIAL HYPERTENSION: ICD-10-CM

## 2023-02-28 DIAGNOSIS — R06.00 DYSPNEA, UNSPECIFIED TYPE: ICD-10-CM

## 2023-02-28 DIAGNOSIS — L81.4 OTHER MELANIN HYPERPIGMENTATION: ICD-10-CM | Status: STABLE

## 2023-02-28 DIAGNOSIS — L72.8 OTHER FOLLICULAR CYSTS OF THE SKIN AND SUBCUTANEOUS TISSUE: ICD-10-CM | Status: STABLE

## 2023-02-28 DIAGNOSIS — Z01.818 PRE-OP EXAM: Primary | ICD-10-CM

## 2023-02-28 DIAGNOSIS — I49.9 IRREGULAR HEART RATE: ICD-10-CM

## 2023-02-28 DIAGNOSIS — D22 MELANOCYTIC NEVI: ICD-10-CM | Status: STABLE

## 2023-02-28 DIAGNOSIS — Z85.828 PERSONAL HISTORY OF OTHER MALIGNANT NEOPLASM OF SKIN: ICD-10-CM | Status: STABLE

## 2023-02-28 DIAGNOSIS — I49.3 VENTRICULAR ECTOPIC BEATS: ICD-10-CM

## 2023-02-28 DIAGNOSIS — I49.9 IRREGULAR HEART BEAT: ICD-10-CM

## 2023-02-28 DIAGNOSIS — M16.10 HIP ARTHRITIS: ICD-10-CM

## 2023-02-28 DIAGNOSIS — D18.0 HEMANGIOMA: ICD-10-CM | Status: STABLE

## 2023-02-28 DIAGNOSIS — R73.09 ELEVATED GLUCOSE: ICD-10-CM

## 2023-02-28 DIAGNOSIS — M25.552 LEFT HIP PAIN: ICD-10-CM

## 2023-02-28 PROBLEM — D22.5 MELANOCYTIC NEVI OF TRUNK: Status: ACTIVE | Noted: 2023-02-28

## 2023-02-28 PROBLEM — D18.01 HEMANGIOMA OF SKIN AND SUBCUTANEOUS TISSUE: Status: ACTIVE | Noted: 2023-02-28

## 2023-02-28 PROCEDURE — ? COUNSELING

## 2023-02-28 PROCEDURE — ? SUNSCREEN RECOMMENDATIONS

## 2023-02-28 PROCEDURE — ? TREATMENT REGIMEN

## 2023-02-28 PROCEDURE — 99213 OFFICE O/P EST LOW 20 MIN: CPT

## 2023-02-28 PROCEDURE — ? ADDITIONAL NOTES

## 2023-02-28 PROCEDURE — ? FULL BODY SKIN EXAM

## 2023-02-28 RX ORDER — METOPROLOL SUCCINATE 50 MG/1
TABLET, EXTENDED RELEASE ORAL
Qty: 90 TABLET | Refills: 1 | Status: SHIPPED | OUTPATIENT
Start: 2023-02-28

## 2023-02-28 RX ORDER — KETOROLAC TROMETHAMINE 30 MG/ML
30 INJECTION, SOLUTION INTRAMUSCULAR; INTRAVENOUS ONCE
Qty: 1 ML | Refills: 0
Start: 2023-02-28 | End: 2023-02-28 | Stop reason: CLARIF

## 2023-02-28 RX ORDER — KETOROLAC TROMETHAMINE 30 MG/ML
30 INJECTION, SOLUTION INTRAMUSCULAR; INTRAVENOUS ONCE
Status: COMPLETED | OUTPATIENT
Start: 2023-02-28 | End: 2023-02-28

## 2023-02-28 RX ADMIN — KETOROLAC TROMETHAMINE 30 MG: 30 INJECTION, SOLUTION INTRAMUSCULAR; INTRAVENOUS at 09:33

## 2023-02-28 SDOH — ECONOMIC STABILITY: HOUSING INSECURITY
IN THE LAST 12 MONTHS, WAS THERE A TIME WHEN YOU DID NOT HAVE A STEADY PLACE TO SLEEP OR SLEPT IN A SHELTER (INCLUDING NOW)?: NO

## 2023-02-28 SDOH — ECONOMIC STABILITY: FOOD INSECURITY: WITHIN THE PAST 12 MONTHS, THE FOOD YOU BOUGHT JUST DIDN'T LAST AND YOU DIDN'T HAVE MONEY TO GET MORE.: NEVER TRUE

## 2023-02-28 SDOH — ECONOMIC STABILITY: INCOME INSECURITY: HOW HARD IS IT FOR YOU TO PAY FOR THE VERY BASICS LIKE FOOD, HOUSING, MEDICAL CARE, AND HEATING?: NOT HARD AT ALL

## 2023-02-28 SDOH — ECONOMIC STABILITY: FOOD INSECURITY: WITHIN THE PAST 12 MONTHS, YOU WORRIED THAT YOUR FOOD WOULD RUN OUT BEFORE YOU GOT MONEY TO BUY MORE.: NEVER TRUE

## 2023-02-28 ASSESSMENT — PATIENT HEALTH QUESTIONNAIRE - PHQ9
SUM OF ALL RESPONSES TO PHQ QUESTIONS 1-9: 0
1. LITTLE INTEREST OR PLEASURE IN DOING THINGS: 0
SUM OF ALL RESPONSES TO PHQ QUESTIONS 1-9: 0
2. FEELING DOWN, DEPRESSED OR HOPELESS: 0
SUM OF ALL RESPONSES TO PHQ9 QUESTIONS 1 & 2: 0

## 2023-02-28 ASSESSMENT — LOCATION DETAILED DESCRIPTION DERM
LOCATION DETAILED: PERIUMBILICAL SKIN
LOCATION DETAILED: LEFT INFERIOR UPPER BACK
LOCATION DETAILED: RIGHT POSTERIOR SHOULDER
LOCATION DETAILED: SUPERIOR THORACIC SPINE
LOCATION DETAILED: LEFT SUPERIOR LATERAL UPPER BACK
LOCATION DETAILED: RIGHT ANTERIOR SHOULDER

## 2023-02-28 ASSESSMENT — LOCATION SIMPLE DESCRIPTION DERM
LOCATION SIMPLE: LEFT UPPER BACK
LOCATION SIMPLE: RIGHT SHOULDER
LOCATION SIMPLE: UPPER BACK
LOCATION SIMPLE: ABDOMEN

## 2023-02-28 ASSESSMENT — LOCATION ZONE DERM
LOCATION ZONE: ARM
LOCATION ZONE: TRUNK
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
## 2023-02-28 NOTE — TELEPHONE ENCOUNTER
Call made to [facility] to assist with scheduling. Someone from the clinical staff will reach out to the patient to get her scheduled. Patient will for sure be able to be seen before her surgery date -  on the phone just could not get her scheduled d/t scheduling blocks. Patient was made aware.

## 2023-02-28 NOTE — TELEPHONE ENCOUNTER
----- Message from Stephanie Luis sent at 2/28/2023  4:02 PM EST -----  Subject: Message to Provider    QUESTIONS  Information for Provider? Patient is calling in and she is stating that   the cardiologist that she was referred to they can not see her till mid   April to early May. She stated that she was told by the PCP that she needs   to be seen sooner. Patient is not sure what she should do moving forward. Please return her call. Thank you.   ---------------------------------------------------------------------------  --------------  Deng Krueger INFO  [phone]; OK to leave message on voicemail  ---------------------------------------------------------------------------  --------------  SCRIPT ANSWERS  Relationship to Patient?  Self

## 2023-02-28 NOTE — PROCEDURE: MIPS QUALITY
Quality 110: Preventive Care And Screening: Influenza Immunization: Influenza immunization was not ordered or administered, reason not given
Quality 474: Zoster Vaccination Status: Shingrix Vaccination not Administered or Previously Received, Reason not Otherwise Specified
Quality 226: Preventive Care And Screening: Tobacco Use: Screening And Cessation Intervention: Patient screened for tobacco use and is an ex/non-smoker
Quality 130: Documentation Of Current Medications In The Medical Record: Current Medications Documented
Detail Level: Detailed
Quality 431: Preventive Care And Screening: Unhealthy Alcohol Use - Screening: Patient screened for unhealthy alcohol use using a single question and scores less than 2 times per year
Quality 111:Pneumonia Vaccination Status For Older Adults: Pneumococcal Vaccination not Administered or Previously Received, Reason not Otherwise Specified

## 2023-02-28 NOTE — Clinical Note
Looks like Kathi Whitley will need additional work-up,  hopefully everything can be done before the scheduled surgery.  EMMANUELLE

## 2023-02-28 NOTE — PROGRESS NOTES
Patient reports she does not have restrictions to blood transfusions - she has not have any issues in the past that she is aware of.

## 2023-02-28 NOTE — HPI: EVALUATION OF SKIN LESION(S)
What Type Of Note Output Would You Prefer (Optional)?: Standard Output
Hpi Title: Evaluation of Skin Lesions
Additional History: Patient here for skin cancer screening, no new concerns

## 2023-03-01 DIAGNOSIS — Z01.818 PRE-OP EXAM: ICD-10-CM

## 2023-03-01 DIAGNOSIS — R94.31 ABNORMAL EKG: Primary | ICD-10-CM

## 2023-03-01 NOTE — TELEPHONE ENCOUNTER
There are no New Patient appt's available. Please advise where pt can be worked in. This was sent back to the [facility]

## 2023-03-01 NOTE — RESULT ENCOUNTER NOTE
Since patient have significant arthritis, she will not be able to do exercise. So his stress test Myoview is need to be canceled. dobutamine echo is ordered.   Please clarify with patient and cardiology department

## 2023-03-02 ENCOUNTER — TELEPHONE (OUTPATIENT)
Dept: ORTHOPEDIC SURGERY | Age: 63
End: 2023-03-02

## 2023-03-02 DIAGNOSIS — M62.838 NECK MUSCLE SPASM: ICD-10-CM

## 2023-03-02 DIAGNOSIS — M62.830 BACK SPASM: ICD-10-CM

## 2023-03-02 DIAGNOSIS — F41.9 ANXIETY: ICD-10-CM

## 2023-03-02 RX ORDER — ESCITALOPRAM OXALATE 20 MG/1
TABLET ORAL
Qty: 90 TABLET | Refills: 1 | Status: SHIPPED | OUTPATIENT
Start: 2023-03-02

## 2023-03-02 RX ORDER — TIZANIDINE 2 MG/1
TABLET ORAL
Qty: 30 TABLET | Refills: 5 | Status: SHIPPED | OUTPATIENT
Start: 2023-03-02

## 2023-03-02 RX ORDER — FOLIC ACID 1 MG/1
TABLET ORAL
Qty: 90 TABLET | Refills: 1 | Status: SHIPPED | OUTPATIENT
Start: 2023-03-02

## 2023-03-02 NOTE — TELEPHONE ENCOUNTER
Future Appointments    Encounter Information    Provider Department Appt Notes   5/10/2023 Alysa Naranjo MD [facility] Return in about 3 months (around 5/10/2023).      Past Visits    Date Provider Specialty Visit Type Primary Dx   02/28/2023 Alysa Naranjo MD Internal Medicine Office Visit Pre-op exam

## 2023-03-03 DIAGNOSIS — Z01.818 PRE-OP EXAM: ICD-10-CM

## 2023-03-03 DIAGNOSIS — C44.90 SKIN CANCER: Primary | ICD-10-CM

## 2023-03-03 DIAGNOSIS — R74.8 ELEVATED ALKALINE PHOSPHATASE LEVEL: ICD-10-CM

## 2023-03-03 DIAGNOSIS — I49.9 IRREGULAR HEART RATE: ICD-10-CM

## 2023-03-03 DIAGNOSIS — M16.10 HIP ARTHRITIS: ICD-10-CM

## 2023-03-03 DIAGNOSIS — R73.09 ELEVATED GLUCOSE: ICD-10-CM

## 2023-03-03 DIAGNOSIS — E87.1 HYPONATREMIA: ICD-10-CM

## 2023-03-03 DIAGNOSIS — I49.3 VENTRICULAR ECTOPIC BEATS: ICD-10-CM

## 2023-03-03 LAB
A/G RATIO: 1.7 (ref 1.1–2.2)
ALBUMIN SERPL-MCNC: 4.8 G/DL (ref 3.4–5)
ALP BLD-CCNC: 159 U/L (ref 40–129)
ALT SERPL-CCNC: 14 U/L (ref 10–40)
ANION GAP SERPL CALCULATED.3IONS-SCNC: 12 MMOL/L (ref 3–16)
APTT: 33.6 SEC (ref 23–34.3)
AST SERPL-CCNC: 21 U/L (ref 15–37)
BILIRUB SERPL-MCNC: 0.3 MG/DL (ref 0–1)
BUN BLDV-MCNC: 12 MG/DL (ref 7–20)
CALCIUM SERPL-MCNC: 10 MG/DL (ref 8.3–10.6)
CHLORIDE BLD-SCNC: 91 MMOL/L (ref 99–110)
CO2: 27 MMOL/L (ref 21–32)
CREAT SERPL-MCNC: 0.6 MG/DL (ref 0.6–1.2)
GFR SERPL CREATININE-BSD FRML MDRD: >60 ML/MIN/{1.73_M2}
GLUCOSE BLD-MCNC: 79 MG/DL (ref 70–99)
HCT VFR BLD CALC: 37.4 % (ref 36–48)
HEMOGLOBIN: 13 G/DL (ref 12–16)
INR BLD: 1.06 (ref 0.87–1.14)
MCH RBC QN AUTO: 33.8 PG (ref 26–34)
MCHC RBC AUTO-ENTMCNC: 34.7 G/DL (ref 31–36)
MCV RBC AUTO: 97.4 FL (ref 80–100)
PDW BLD-RTO: 12.2 % (ref 12.4–15.4)
PLATELET # BLD: 258 K/UL (ref 135–450)
PMV BLD AUTO: 8.3 FL (ref 5–10.5)
POTASSIUM SERPL-SCNC: 4.4 MMOL/L (ref 3.5–5.1)
PROTHROMBIN TIME: 13.7 SEC (ref 11.7–14.5)
RBC # BLD: 3.84 M/UL (ref 4–5.2)
SODIUM BLD-SCNC: 130 MMOL/L (ref 136–145)
TOTAL CK: 63 U/L (ref 26–192)
TOTAL PROTEIN: 7.7 G/DL (ref 6.4–8.2)
TROPONIN: <0.01 NG/ML
TSH REFLEX: 1.18 UIU/ML (ref 0.27–4.2)
VITAMIN D 25-HYDROXY: 46.5 NG/ML
WBC # BLD: 6.3 K/UL (ref 4–11)

## 2023-03-04 LAB
ESTIMATED AVERAGE GLUCOSE: 99.7 MG/DL
HBA1C MFR BLD: 5.1 %
REASON FOR REJECTION: NORMAL
REJECTED TEST: NORMAL

## 2023-03-06 ENCOUNTER — TELEPHONE (OUTPATIENT)
Dept: INTERNAL MEDICINE CLINIC | Age: 63
End: 2023-03-06

## 2023-03-06 RX ORDER — DEXAMETHASONE SODIUM PHOSPHATE 4 MG/ML
10 INJECTION, SOLUTION INTRA-ARTICULAR; INTRALESIONAL; INTRAMUSCULAR; INTRAVENOUS; SOFT TISSUE ONCE
Status: CANCELLED | OUTPATIENT
Start: 2023-03-06 | End: 2023-03-06

## 2023-03-06 RX ORDER — OXYCODONE HCL 20 MG/1
20 TABLET, FILM COATED, EXTENDED RELEASE ORAL ONCE
Status: CANCELLED | OUTPATIENT
Start: 2023-03-06 | End: 2023-03-06

## 2023-03-06 NOTE — TELEPHONE ENCOUNTER
[facility] with rejected specimen--the MRSA DNA probe--taken 2/28---done at lab 3/4 ---Thanks.

## 2023-03-07 NOTE — TELEPHONE ENCOUNTER
Auth: # [id number]    Date: 03/28/23  Type of SX:  OUTPATIENT  Location: [facility]  CPT: 57022   DX Code: M16.12  SX area: [facility]  Insurance: [employer]  SX VALID 03/28/23-04/27/23

## 2023-03-08 DIAGNOSIS — M16.12 ARTHRITIS OF LEFT HIP: ICD-10-CM

## 2023-03-08 DIAGNOSIS — I10 ESSENTIAL HYPERTENSION: ICD-10-CM

## 2023-03-08 DIAGNOSIS — Z01.818 PRE-OP TESTING: ICD-10-CM

## 2023-03-08 LAB
BILIRUBIN URINE: NEGATIVE
BLOOD, URINE: NEGATIVE
CLARITY: CLEAR
COLOR: YELLOW
GLUCOSE URINE: NEGATIVE MG/DL
KETONES, URINE: NEGATIVE MG/DL
LEUKOCYTE ESTERASE, URINE: NEGATIVE
MICROSCOPIC EXAMINATION: NORMAL
NITRITE, URINE: NEGATIVE
PH UA: 7 (ref 5–8)
PROTEIN UA: NEGATIVE MG/DL
SPECIFIC GRAVITY UA: 1.01 (ref 1–1.03)
URINE TYPE: NORMAL
UROBILINOGEN, URINE: 1 E.U./DL

## 2023-03-08 RX ORDER — TRAMADOL HYDROCHLORIDE 50 MG/1
50 TABLET ORAL EVERY 8 HOURS PRN
Qty: 21 TABLET | Refills: 0 | Status: SHIPPED | OUTPATIENT
Start: 2023-03-08 | End: 2023-03-15

## 2023-03-09 LAB — MRSA SCREEN RT-PCR: NORMAL

## 2023-03-13 ENCOUNTER — HOSPITAL ENCOUNTER (OUTPATIENT)
Dept: NON INVASIVE DIAGNOSTICS | Age: 63
Discharge: HOME | End: 2023-03-13
Payer: COMMERCIAL

## 2023-03-13 DIAGNOSIS — R94.31 ABNORMAL EKG: ICD-10-CM

## 2023-03-13 DIAGNOSIS — Z01.818 PRE-OP EXAM: ICD-10-CM

## 2023-03-13 PROCEDURE — 93325 DOPPLER ECHO COLOR FLOW MAPG: CPT

## 2023-03-13 PROCEDURE — 93320 DOPPLER ECHO COMPLETE: CPT

## 2023-03-13 PROCEDURE — 93303 ECHO TRANSTHORACIC: CPT

## 2023-03-13 NOTE — PROGRESS NOTES
[address] [facility]  [phone]  3/15/23  Referring: Dr. Keira Small MD (PCP)    REASON FOR CONSULT/CHIEF COMPLAINT/HPI     Reason for visit/ Chief complaint  Pre-Op Clearance   HPI Shayy Chiu is a 60 y.o. female who is being seen at the request of Dr. Charity Jolley for cardiac evaluation post abnormal EKG. She is scheduled for L hip replacement with Dr Libia Sloan on 3/38/23 at [facility]. Her history includes HTN, chronic hyponatremia, alcohol withdrawal.    Her PCP ordered a Dobutamine stress echo that she presented for yesterday. I am supervising the stress lab this week, and it was brought to my attention that she had been taking her metoprolol, which would have made it difficult to complete the DSE. Upon review, she was already scheduled to see me today, she had no cardiac symptoms, and her surface echo was reassuring, so I opted to cancel the stress portion pending further evaluation today, in order to determine whether she should return after stopping metoprolol, or if she even needed a stress test in the first place. Her father had an MI in his 62s. Today, she deneis CP, SOB, palpitations, or dizziness at this time. She drinks a glass of wine per month on average. Patient is adherent with medications and is tolerating them well without side effects     HISTORY/ALLERGIES/ROS     MedHx:  has a past medical history of Alcohol problem drinking, Alcohol withdrawal delirium ([address] Anxiety, Facial bones, closed fracture ([address] Hypertension, Hyponatremia, Malignant hypertension, and Panic attacks. SurgHx:  has a past surgical history that includes Neck surgery (2000); Tubal ligation; Wrist ganglion excision (1988); other surgical history; Colonoscopy (11/16/2018); and Colonoscopy (N/A, 11/16/2018). SocHx:  reports that she quit smoking about 5 years ago. Her smoking use included cigarettes. She started smoking about 44 years ago. She has a 19.00 pack-year smoking history.  She has been exposed to tobacco smoke. She has never used smokeless tobacco. She reports current drug use. Drug: Marijuana Alaina Pleas). She reports that she does not drink alcohol. FamHx:  Family History   Problem Relation Age of Onset    Kidney Disease Mother     Rheum Arthritis Mother     Migraines Mother     Heart Disease Father     Hypertension Father     Cancer Father         lung/liver    Asthma Father     Breast Cancer Maternal Grandmother     Breast Cancer Maternal Aunt      Allergies: Augmentin [amoxicillin-pot clavulanate]     MEDICATIONS      Prior to Admission medications    Medication Sig Start Date End Date Taking? Authorizing Provider   traMADol (ULTRAM) 50 MG tablet Take 1 tablet by mouth every 8 hours as needed for Pain for up to 7 days.  Max Daily Amount: 150 mg 3/8/23 3/15/23 Yes Fiorella Ruiz MD   folic acid (FOLVITE) 1 MG tablet TAKE ONE TABLET BY MOUTH DAILY 3/2/23  Yes M Collette Rigger, MD   tiZANidine (ZANAFLEX) 2 MG tablet TAKE ONE TABLET BY MOUTH EVERY 8 HOURS AS NEEDED FOR BACK PAIN 3/2/23  Yes Elden Cheadle, MD   escitalopram (LEXAPRO) 20 MG tablet TAKE ONE TABLET BY MOUTH DAILY 3/2/23  Yes Elden Cheadle, MD   metoprolol succinate (TOPROL XL) 50 MG extended release tablet TAKE ONE TABLET BY MOUTH DAILY 2/28/23  Yes M Collette Rigger, MD   VENTOLIN  (90 Base) MCG/ACT inhaler INHALE TWO PUFFS BY MOUTH EVERY 6 HOURS AS NEEDED FOR WHEEZING 2/24/23  Yes Elden Cheadle, MD   fluticasone (FLONASE) 50 MCG/ACT nasal spray SPRAY TWO SPRAYS IN EACH NOSTRIL ONCE DAILY 2/24/23  Yes Elden Cheadle, MD   tiotropium (Maxi Cordial) 18 MCG inhalation capsule Inhale 1 capsule into the lungs daily 2/10/23  Yes Elden Cheadle, MD   naproxen (NAPROSYN) 500 MG tablet TAKE ONE TABLET BY MOUTH TWICE A DAY AS NEEDED FOR ARTHRITIS 1/30/23  Yes Elden Cheadle, MD   atorvastatin (LIPITOR) 20 MG tablet TAKE ONE TABLET BY MOUTH DAILY 1/9/23  Yes M Collette Rigger, MD   thiamine 100 MG tablet TAKE ONE TABLET BY MOUTH DAILY 12/14/22  Yes Dipak Cyr MD   montelukast (SINGULAIR) 10 MG tablet TAKE ONE TABLET BY MOUTH DAILY 12/5/22  Yes Dipak Cyr MD   ascorbic acid (VITAMIN C) 500 MG tablet TAKE ONE TABLET BY MOUTH DAILY 12/5/22  Yes Dipak Cyr MD   busPIRone (BUSPAR) 7.5 MG tablet TAKE ONE TABLET BY MOUTH TWICE A DAY AS NEEDED FOR SLEEP 11/10/22  Yes RYLAND Salvador MD   NIFEdipine (PROCARDIA XL) 30 MG extended release tablet Take 1 tablet by mouth daily  Patient taking differently: Take 30 mg by mouth at bedtime 10/28/22  Yes Dipak Cyr MD   clotrimazole (LOTRIMIN) 1 % cream APPLY TOPICALLY TWO TIMES A DAY FOR 4 WEEKS 8/1/22  Yes Dipak Cyr MD   omeprazole (PRILOSEC) 40 MG delayed release capsule TAKE ONE CAPSULE BY MOUTH EVERY MORNING BEFORE BREAKFAST 10/25/21  Yes Dipak Cyr MD   fluocinonide (LIDEX) 0.05 % cream Apply topically 2 times daily. 12/11/17  Yes Dipak Cyr MD       PHYSICAL EXAM        Vitals:    03/14/23 0917   BP: 118/64   Pulse: 68    Weight: 148 lb 3.2 oz (67.2 kg)     Gen Alert, cooperative, no distress. Walks with a marked limp. Heart  Regular rate and rhythm, II/VI TAMIKA at RUSB   Head Normocephalic, atraumatic, no abnormalities Abd  Soft, NT, +BS, no mass, no OM   Eyes PERRLA, conj/corn clear Ext  Ext nl, AT, no C/C, no edema   Nose Nares normal, no drain age, Non-tender Pulse 2+ and symmetric   Throat Lips, mucosa, tongue normal Skin +Skin tenting. No rash/lesions   Neck S/S, TM, NT, no bruit Psych Nl mood and affect   Lung CTA-B, unlabored, no DTP     Ch wall NT, no deform       LABS and Imaging     Relevant and available CV data reviewed    EKG personally interpreted: 2/28/23  Sinus  Rhythm  - frequent ectopic ventricular beat s   # VECs = 4  -Left atrial enlargement.      Results for orders placed or performed during the hospital encounter of 02/22/17   EKG 12 Lead   Result Value Ref Range    Ventricular Rate 125 BPM    Atrial Rate 125 BPM    P-R Interval 114 ms    QRS Duration 88 ms    Q-T Interval 310 ms    QTc Calculation (Bazett) 447 ms    P Axis 73 degrees    R Axis 65 degrees    T Axis 44 degrees    Diagnosis       Sinus tachycardia  Non-diagnostic Q wave  Otherwise normal ECG except increase rate  Confirmed by [facility] HOMAR DUQUE ([id number]) on 2/22/2017 1:50:55 PM     Results for orders placed or performed during the hospital encounter of 02/13/17   EKG 12 lead   Result Value Ref Range    Ventricular Rate 89 BPM    Atrial Rate 89 BPM    P-R Interval 160 ms    QRS Duration 108 ms    Q-T Interval 432 ms    QTc Calculation (Bazett) 525 ms    P Axis 74 degrees    R Axis 57 degrees    T Axis 58 degrees    Diagnosis       Normal sinus rhythm  Baseline artifact obscures  Biatrial enlargement  Prolonged QT  likely  Abnormal ECG  QRS tor and QT longer.     Confirmed by [facility] CENTRAL MD, [address] ([id number]) on 2/13/2017 4:35:55 PM         Lab Results   Component Value Date    CHOL 200 08/14/2015    CHOL 129 08/14/2015    CHOLFAST 179 03/08/2022    TRIG 79 08/14/2015    TRIGLYCFAST 67 03/08/2022    HDL 74 (H) 03/08/2022    LDLCALC 92 03/08/2022    LABVLDL 13 03/08/2022       The 10-year ASCVD risk score (Yomi MARTINEZ, et al., 2019) is: 4.1%    Values used to calculate the score:      Age: 60 years      Sex: Female      Is Non- : No      Diabetic: No      Tobacco smoker: No      Systolic Blood Pressure: 794 mmHg      Is BP treated: Yes      HDL Cholesterol: 74 mg/dL      Total Cholesterol: 179 mg/dL    Lab Results   Component Value Date    PROBNP 1,006 (H) 02/13/2017        Lab Results   Component Value Date    TROPONINI <0.01 03/03/2023    TROPONINI <0.01 02/13/2017     Hemoglobin A1C   Date Value Ref Range Status   03/03/2023 5.1 See comment % Final     Comment:     Comment:  Diagnosis of Diabetes: > or = 6.5%  Increased risk of diabetes (Prediabetes): 5.7-6.4%  Glycemic Control: Nonpregnant Adults: <7.0%                    Pregnant: <6.0%            Lab Results   Component Value Date    TSH 1.05 08/14/2015    TSHREFLEX 1.18 03/03/2023       Lab Results   Component Value Date    WBC 6.3 03/03/2023    HGB 13.0 03/03/2023    HCT 37.4 03/03/2023    MCV 97.4 03/03/2023     03/03/2023     Lab Results   Component Value Date     (L) 03/03/2023    K 4.4 03/03/2023    CL 91 (L) 03/03/2023    CO2 27 03/03/2023    BUN 12 03/03/2023    CREATININE 0.6 03/03/2023    GLUCOSE 79 03/03/2023    CALCIUM 10.0 03/03/2023    PROT 7.7 03/03/2023    LABALBU 4.8 03/03/2023    BILITOT 0.3 03/03/2023    ALKPHOS 159 (H) 03/03/2023    AST 21 03/03/2023    ALT 14 03/03/2023    LABGLOM >60 03/03/2023    GFRAA >60 03/08/2022    AGRATIO 1.7 03/03/2023    GLOB 2.4 07/25/2019     *Note reviewing [facility], her sodium has always been 130-134 since 2020, and her chloride has already been around 90*    CARDIAC IMAGING/TESTING:    Echo:   Dobutamine Echo:3/13/23  Summary   *Left ventricle - normal size, thickness and function with EF of 60%   *Aortic valve - sclerotic   *Mitral valve - mild-moderate regurgitation   *Tricuspid valve - mild regurgitation with RVSP of 30mmHg    ECTOR:  No results found for this or any previous visit. STRESS:   No results found for this or any previous visit. Cath:   No results found for this or any previous visit. Carotid Dopp B 3/23/22   Right internal carotid artery 50-69% diameter reducing stenosis based on    velocity criteria. The right vertebral artery demonstrates normal antegrade flow. Left internal carotid artery 50-69% diameter reducing stenosis based on    velocity criteria. This appears to be a slight increase from previous study    of 12/29/21. The left external carotid artery appears to have >50% diameter reduction    based on velocity criteria.      CARDIAC RHYTHM ASSESSMENT:    Holter/Event monitor  No results found for this or any previous vis    ModerateRisk  ModerateComplexity/Medical Decision Making    Outside/Care everywhere records Reviewed  Labs Reviewed  Prior Imaging, ekg, cath, echo reviewed when available  Medications reviewed  Old Notes reviewed  ASSESSMENT AND PLAN     Encounter Diagnoses   Name Primary? Preop cardiovascular exam Yes    Abnormal EKG     Other hyperlipidemia     Essential hypertension     Asymptomatic stenosis of left carotid artery     Tobacco use     History of alcohol abuse     PVC (premature ventricular contraction)     Chronic hyponatremia      Abnormal EKG - only abnormality is frequent monomorphic PVCs  -She is asymptomatic  -PVCs are monomorphic and easily suppressed by metoprolol  -PVCs themselves are not an ischemia equivalent, she has a normal LVEF, no ischemic symptoms, and an overall low risk of ASCVD  -As a result, she does not require further coronary evaluation prior to hip surgery  -In general, asymptomatic PVCs do not require treatment if total PVC burden is < 10% and if LVEF is normal.  -In her case, she has HTN, and it is reasonable to continue metoprolol regardless of if PVCs are symptomatic    Plan - continue metoprolol. Will get 24 hour Holter to quantitate current PVC burden for a baseline. Her hip surgery does not need to wait on results of the Holter, although we should have these results, which I expect to be reassuring, well in advance of her planned surgery. Hypertension  BP well controlled  Plan - Continue current medications. Hyperlipidemia  Managed per PCP  Stable on Lipitor 20mg    Preop cardiac examination  She is at low cardiac risk to proceed with her hip surgery and can proceed without further testing. She does not require a stress test.  Letter sent. Chronic hyponatremia  She has been referred to Dr. Gretta Tang by her PCP, appt isn't until end of April, which is a month after her planned surgery.   Reviewing records, her lab results have been stable for several years  She hasn't had a seizure or other neurologic symptoms  She is definitely not volume overloaded, in fact she looks dry on my exam.  She is not on diuretics. I doubt that her sodium can be corrected, and I doubt that her chronic hyponatremia will cause a problem with her hip surgery  I called Dr Silver Speaks directly and discussed with him that the patient is in a low of pain from her hip and asked him to see her sooner so that her hip surgery wouldn't need to be delayed. Aortic sclerosis/mitral regurgitation - Faint AS murmur on exam today. Will need repeat echo in a year to re-evaluate for progression of valve disease    Patient counseled on lifestyle modification, diet, and exercise. Follow Up: She will need ongoing follow-up for her PVCs as well as mild valve disease seen on echo. 1 year with echo same day unless holter monitor is abnormal     Kenzie Vance MD  Cardiologist [facility]    Scribe Attestation: This note was scribed in the presence of Dr. Ansley Woods by Jesenia Virgen RN.

## 2023-03-13 NOTE — PROGRESS NOTES
Pt here for Dobutamine stress echo. Pt tool Toprol dose this am as instructed per OP office. Dr. Rancho Augustine made aware. States to cancel test this am d/t inability to attain thr with Dobutamine on BB. Pt has OV as new pt with Dr. Rancho Augustine tomorrow. Dr. Rancho Augustine spoke with pt and states will have echo read and will f/u with pt tomorrow for further testing orders as needed based on eval. Pt verbalized understanding.

## 2023-03-14 ENCOUNTER — OFFICE VISIT (OUTPATIENT)
Dept: CARDIOLOGY CLINIC | Age: 63
End: 2023-03-14
Payer: COMMERCIAL

## 2023-03-14 ENCOUNTER — TELEPHONE (OUTPATIENT)
Dept: INTERNAL MEDICINE CLINIC | Age: 63
End: 2023-03-14

## 2023-03-14 VITALS
HEIGHT: 67 IN | WEIGHT: 148.2 LBS | BODY MASS INDEX: 23.26 KG/M2 | DIASTOLIC BLOOD PRESSURE: 64 MMHG | HEART RATE: 68 BPM | SYSTOLIC BLOOD PRESSURE: 118 MMHG

## 2023-03-14 DIAGNOSIS — I49.3 PVC (PREMATURE VENTRICULAR CONTRACTION): ICD-10-CM

## 2023-03-14 DIAGNOSIS — I65.22 ASYMPTOMATIC STENOSIS OF LEFT CAROTID ARTERY: ICD-10-CM

## 2023-03-14 DIAGNOSIS — Z72.0 TOBACCO USE: ICD-10-CM

## 2023-03-14 DIAGNOSIS — R94.31 ABNORMAL EKG: ICD-10-CM

## 2023-03-14 DIAGNOSIS — E78.49 OTHER HYPERLIPIDEMIA: ICD-10-CM

## 2023-03-14 DIAGNOSIS — Z01.810 PREOP CARDIOVASCULAR EXAM: Primary | ICD-10-CM

## 2023-03-14 DIAGNOSIS — I10 ESSENTIAL HYPERTENSION: ICD-10-CM

## 2023-03-14 DIAGNOSIS — F10.11 HISTORY OF ALCOHOL ABUSE: ICD-10-CM

## 2023-03-14 DIAGNOSIS — E87.1 CHRONIC HYPONATREMIA: ICD-10-CM

## 2023-03-14 PROBLEM — I34.0 NONRHEUMATIC MITRAL VALVE REGURGITATION: Status: ACTIVE | Noted: 2023-03-14

## 2023-03-14 PROBLEM — I36.1 NONRHEUMATIC TRICUSPID VALVE REGURGITATION: Status: ACTIVE | Noted: 2023-03-14

## 2023-03-14 PROCEDURE — 99204 OFFICE O/P NEW MOD 45 MIN: CPT | Performed by: INTERNAL MEDICINE

## 2023-03-14 PROCEDURE — 3017F COLORECTAL CA SCREEN DOC REV: CPT | Performed by: INTERNAL MEDICINE

## 2023-03-14 PROCEDURE — 3074F SYST BP LT 130 MM HG: CPT | Performed by: INTERNAL MEDICINE

## 2023-03-14 PROCEDURE — 1036F TOBACCO NON-USER: CPT | Performed by: INTERNAL MEDICINE

## 2023-03-14 PROCEDURE — G8427 DOCREV CUR MEDS BY ELIG CLIN: HCPCS | Performed by: INTERNAL MEDICINE

## 2023-03-14 PROCEDURE — G8420 CALC BMI NORM PARAMETERS: HCPCS | Performed by: INTERNAL MEDICINE

## 2023-03-14 PROCEDURE — 3078F DIAST BP <80 MM HG: CPT | Performed by: INTERNAL MEDICINE

## 2023-03-14 PROCEDURE — G8482 FLU IMMUNIZE ORDER/ADMIN: HCPCS | Performed by: INTERNAL MEDICINE

## 2023-03-14 NOTE — LETTER
[facility] CARDIOLOGY[address]  Dept: [phone]  Dept Fax: [phone]      2023    Magy Camejo  : 1960  DOS: 3/14/2023    To Whom it May Concern:    Marva Castillo has been evaluated for cardiac clearance. Based on diagnostic testing including EKG, Marva Castillo is considered at a low risk for surgery. There is no further cardiac testing that could be done to lower the risk. Please let my office know if you have any questions or concerns.           Elsy Layne MD                     [facility] serving [place] and [place]

## 2023-03-14 NOTE — PATIENT INSTRUCTIONS
Follow up with Dr Nirmal Hodges in 1 year  if heart monitor is normal with an echo same day. Okay to proceed with surgery of hip. Call for any questions or concerns.

## 2023-03-14 NOTE — RESULT ENCOUNTER NOTE
Mild to moderate valvular disease affecting mitral valve and tricuspid valve. I see patient has cardiology appointment today.

## 2023-03-14 NOTE — PROGRESS NOTES
24 hour BioTel monitor placed on the patient here in clinic today. Reviewed proper care and instructions with the patient.      SN : [id number]

## 2023-03-15 NOTE — TELEPHONE ENCOUNTER
Pt states she completely forgot--she has been busy going to multiple appointments and testing. She states that she will  revisit this after her hip surgery on 3/28/23.

## 2023-03-17 ENCOUNTER — HOSPITAL ENCOUNTER (OUTPATIENT)
Dept: NUCLEAR MEDICINE | Age: 63
Discharge: HOME | End: 2023-03-17
Payer: COMMERCIAL

## 2023-03-17 DIAGNOSIS — R74.8 ELEVATED ALKALINE PHOSPHATASE LEVEL: ICD-10-CM

## 2023-03-17 DIAGNOSIS — R74.8 ELEVATED ALKALINE PHOSPHATASE LEVEL: Primary | ICD-10-CM

## 2023-03-17 DIAGNOSIS — C44.90 SKIN CANCER: ICD-10-CM

## 2023-03-17 PROCEDURE — 78306 BONE IMAGING WHOLE BODY: CPT | Performed by: INTERNAL MEDICINE

## 2023-03-17 PROCEDURE — A9503 TC99M MEDRONATE: HCPCS | Performed by: INTERNAL MEDICINE

## 2023-03-17 PROCEDURE — 3430000000 HC RX DIAGNOSTIC RADIOPHARMACEUTICAL: Performed by: INTERNAL MEDICINE

## 2023-03-17 RX ORDER — TC 99M MEDRONATE 20 MG/10ML
26.63 INJECTION, POWDER, LYOPHILIZED, FOR SOLUTION INTRAVENOUS
Status: COMPLETED | OUTPATIENT
Start: 2023-03-17 | End: 2023-03-17

## 2023-03-17 RX ADMIN — TC 99M MEDRONATE 26.63 MILLICURIE: 20 INJECTION, POWDER, LYOPHILIZED, FOR SOLUTION INTRAVENOUS at 08:22

## 2023-03-17 NOTE — RESULT ENCOUNTER NOTE
Elevated alkaline phosphatase likely related to healing sternal fracture as well as rib fracture as well as left hip arthritis. We will continue to monitor  Repeat alkaline phosphatase in 6 weeks.

## 2023-03-20 DIAGNOSIS — E87.1 HYPONATREMIA: ICD-10-CM

## 2023-03-20 LAB
ANION GAP SERPL CALCULATED.3IONS-SCNC: 15 MMOL/L (ref 3–16)
BUN SERPL-MCNC: 13 MG/DL (ref 7–20)
CALCIUM SERPL-MCNC: 9.3 MG/DL (ref 8.3–10.6)
CHLORIDE SERPL-SCNC: 92 MMOL/L (ref 99–110)
CO2 SERPL-SCNC: 24 MMOL/L (ref 21–32)
CREAT SERPL-MCNC: 0.7 MG/DL (ref 0.6–1.2)
GFR SERPLBLD CREATININE-BSD FMLA CKD-EPI: >60 ML/MIN/{1.73_M2}
GLUCOSE SERPL-MCNC: 100 MG/DL (ref 70–99)
OSMOLALITY UR: 554 MOSM/KG (ref 390–1070)
POTASSIUM SERPL-SCNC: 5.5 MMOL/L (ref 3.5–5.1)
SODIUM SERPL-SCNC: 131 MMOL/L (ref 136–145)
SODIUM UR-SCNC: 46 MMOL/L
URATE SERPL-MCNC: 3.6 MG/DL (ref 2.6–6)

## 2023-03-21 ENCOUNTER — TELEPHONE (OUTPATIENT)
Dept: CARDIOLOGY CLINIC | Age: 63
End: 2023-03-21

## 2023-03-21 NOTE — TELEPHONE ENCOUNTER
Called patient and spoke with her in regards to her heart monitor results. She is having zero symptoms and feels fine. She is having her surgery next week. I informed her that [address]Steven Holcomb Carlos is out of the office this week and will review her results when he returns and give recommendation, if any. Pt verbalized understanding and has no further questions at this time.

## 2023-03-23 ENCOUNTER — OFFICE VISIT (OUTPATIENT)
Dept: INTERNAL MEDICINE CLINIC | Age: 63
End: 2023-03-23

## 2023-03-23 VITALS
HEIGHT: 67 IN | WEIGHT: 149 LBS | BODY MASS INDEX: 23.39 KG/M2 | DIASTOLIC BLOOD PRESSURE: 58 MMHG | SYSTOLIC BLOOD PRESSURE: 100 MMHG | HEART RATE: 72 BPM

## 2023-03-23 DIAGNOSIS — I10 ESSENTIAL HYPERTENSION: ICD-10-CM

## 2023-03-23 DIAGNOSIS — E87.1 HYPONATREMIA: ICD-10-CM

## 2023-03-23 DIAGNOSIS — Z01.818 PRE-OP EXAM: ICD-10-CM

## 2023-03-23 DIAGNOSIS — M16.12 ARTHRITIS OF LEFT HIP: Primary | ICD-10-CM

## 2023-03-23 DIAGNOSIS — I47.29 NSVT (NONSUSTAINED VENTRICULAR TACHYCARDIA) (HCC): ICD-10-CM

## 2023-03-23 DIAGNOSIS — I49.3 VENTRICULAR ECTOPIC BEATS: ICD-10-CM

## 2023-03-23 LAB
ANION GAP SERPL CALCULATED.3IONS-SCNC: 9 MMOL/L (ref 3–16)
BUN SERPL-MCNC: 11 MG/DL (ref 7–20)
CALCIUM SERPL-MCNC: 9.5 MG/DL (ref 8.3–10.6)
CHLORIDE SERPL-SCNC: 95 MMOL/L (ref 99–110)
CO2 SERPL-SCNC: 29 MMOL/L (ref 21–32)
CREAT SERPL-MCNC: 0.6 MG/DL (ref 0.6–1.2)
GFR SERPLBLD CREATININE-BSD FMLA CKD-EPI: >60 ML/MIN/{1.73_M2}
GLUCOSE SERPL-MCNC: 102 MG/DL (ref 70–99)
POTASSIUM SERPL-SCNC: 4.6 MMOL/L (ref 3.5–5.1)
SODIUM SERPL-SCNC: 133 MMOL/L (ref 136–145)

## 2023-03-23 ASSESSMENT — ENCOUNTER SYMPTOMS
VOICE CHANGE: 0
SHORTNESS OF BREATH: 0
NAUSEA: 0
TROUBLE SWALLOWING: 0
ABDOMINAL PAIN: 0
VOMITING: 0
WHEEZING: 0
EYE REDNESS: 0
PHOTOPHOBIA: 0

## 2023-03-23 NOTE — PROGRESS NOTES
Subjective:      Chief Complaint   Patient presents with    1 Month Follow-Up    Hip Pain     Left--difficulty walking, burning sensation sitting or standing. \"Miserable\"--tylenol not helping       Patient ID: Beatrice Wise is a 60 y.o. female. HPI  Follow-up visit since last preoperative exam and physical.  Patient has been evaluated by cardiologist as well as nephrologist since then. She continues to complain of increasing left hip pain get worse with physical activities. Since last visit patient underwent multiple work-ups. Her sodium went up to 131. This cleared her for surgery. Patient also got cardiac clearance. Patient denies any chest pain palpitation dizziness shortness of breath. She had echocardiogram as well as Holter monitor. Holter monitor shows nonsustained V. tach and some PVCs. According to patient already cleared by cardiologist.  On further questioning history of fall and chest wall injury. She has elevated alkaline phosphatase and nuclear medicine bone scan shows increased uptake at the sternum and broken rib. Patient currently do not have any pain on the affected side. Patient does not have any signs symptoms of any malignancy. Review of Systems   Constitutional:  Negative for diaphoresis and unexpected weight change. HENT:  Negative for trouble swallowing and voice change. Eyes:  Negative for photophobia, redness and visual disturbance. Respiratory:  Negative for shortness of breath and wheezing. Cardiovascular:  Negative for chest pain and palpitations. Gastrointestinal:  Negative for abdominal pain, nausea and vomiting. Endocrine: Negative for polyphagia and polyuria. Genitourinary:  Negative for difficulty urinating, flank pain and frequency. Musculoskeletal:  Positive for arthralgias. Neurological:  Negative for dizziness and light-headedness.    Vitals:    03/23/23 0932   BP: (!) 100/58   Site: Right Upper Arm   Position: Sitting   Cuff Size: Medium

## 2023-03-23 NOTE — H&P (VIEW-ONLY)
Adult   Pulse: 72   Weight: 149 lb (67.6 kg)   Height: 5' 7\" (1.702 m)       Objective:   Physical Exam  Vitals and nursing note reviewed. Constitutional:       Appearance: Normal appearance. She is not ill-appearing. Eyes:      General: No scleral icterus. Conjunctiva/sclera: Conjunctivae normal.   Neck:      Vascular: No carotid bruit. Cardiovascular:      Rate and Rhythm: Normal rate and regular rhythm. Pulses: Normal pulses. Heart sounds: Normal heart sounds. Pulmonary:      Effort: Pulmonary effort is normal.      Breath sounds: Normal breath sounds. Abdominal:      General: Bowel sounds are normal.   Musculoskeletal:      Comments: Tenderness in the anterior hip. Increased pain on hip flexion and weightbearing   Neurological:      General: No focal deficit present. Mental Status: She is alert and oriented to person, place, and time. Vitals:    03/23/23 0932   BP: (!) 100/58   Site: Right Upper Arm   Position: Sitting   Cuff Size: Medium Adult   Pulse: 72   Weight: 149 lb (67.6 kg)   Height: 5' 7\" (1.702 m)       Assessment/Plan:  Randy Stewart was seen today for 1 month follow-up and hip pain. Diagnoses and all orders for this visit:    Arthritis of left hip  Patient is cleared to surgery. Hopefully her potassium will be down today. Sodium is 131. Fluid restriction advised by nephrologist    Hyponatremia  -     Basic Metabolic Panel; Future    Pre-op exam  Cleared to surgery  Essential hypertension  No orthostatic symptoms. Ventricular ectopic beats  NSVT (nonsustained ventricular tachycardia) (HCC)  Currently on beta-blocker  Asymptomatic. Will get cardiac clearance    An After Visit Summary was printed and given to the patient. Documentation was done using voice recognition dragon software. Every effort was made to ensure accuracy; however, inadvertent  Unintentional computerized transcription errors may be present.      Jeny Garcia MD

## 2023-03-24 ENCOUNTER — OFFICE VISIT (OUTPATIENT)
Dept: ORTHOPEDIC SURGERY | Age: 63
End: 2023-03-24
Payer: COMMERCIAL

## 2023-03-24 VITALS — WEIGHT: 149 LBS | BODY MASS INDEX: 23.39 KG/M2 | HEIGHT: 67 IN

## 2023-03-24 DIAGNOSIS — M25.552 LEFT HIP PAIN: ICD-10-CM

## 2023-03-24 DIAGNOSIS — M16.12 ARTHRITIS OF LEFT HIP: Primary | ICD-10-CM

## 2023-03-24 PROCEDURE — 1036F TOBACCO NON-USER: CPT | Performed by: ORTHOPAEDIC SURGERY

## 2023-03-24 PROCEDURE — G8420 CALC BMI NORM PARAMETERS: HCPCS | Performed by: ORTHOPAEDIC SURGERY

## 2023-03-24 PROCEDURE — G8427 DOCREV CUR MEDS BY ELIG CLIN: HCPCS | Performed by: ORTHOPAEDIC SURGERY

## 2023-03-24 PROCEDURE — 3017F COLORECTAL CA SCREEN DOC REV: CPT | Performed by: ORTHOPAEDIC SURGERY

## 2023-03-24 PROCEDURE — 99214 OFFICE O/P EST MOD 30 MIN: CPT | Performed by: ORTHOPAEDIC SURGERY

## 2023-03-24 PROCEDURE — G8482 FLU IMMUNIZE ORDER/ADMIN: HCPCS | Performed by: ORTHOPAEDIC SURGERY

## 2023-03-24 NOTE — PROGRESS NOTES
labs and PCP clearance note, there does not appear to be any information that precludes them from surgery. The patient can proceed with surgery without further intervention.       ?___________________________   Omayra Cotton MD  ?     cc: Tru Mojica MD

## 2023-03-27 ENCOUNTER — ANESTHESIA EVENT (OUTPATIENT)
Dept: OPERATING ROOM | Age: 63
End: 2023-03-27
Payer: COMMERCIAL

## 2023-03-27 ENCOUNTER — TELEPHONE (OUTPATIENT)
Dept: CARDIOLOGY CLINIC | Age: 63
End: 2023-03-27

## 2023-03-27 NOTE — TELEPHONE ENCOUNTER
Called patient and spoke to her per [facility] message. She is having surgery tomorrow and would prefer to hold off on any changes or new orders until she has the surgery and is feeling better. Discussed with ZOHRA and he is agreeable to plan. Pt advised and will call us when she is ready.

## 2023-03-27 NOTE — TELEPHONE ENCOUNTER
----- Message from Vijay Whitlock MD sent at 3/26/2023  1:44 PM EDT -----  14% PVCs  Let's increase metoprolol XL from 50 to 75, repeat Holter 24 hour in 1 month

## 2023-03-27 NOTE — TELEPHONE ENCOUNTER
Dominique called in stating that pt  is having surgery tomorrow and they need WAK to comment on the pt's holter monitor.    For any questions Dominique can be reached at [phone]

## 2023-03-28 ENCOUNTER — HOSPITAL ENCOUNTER (OUTPATIENT)
Age: 63
Setting detail: OUTPATIENT SURGERY
Discharge: HOME | End: 2023-03-28
Attending: ORTHOPAEDIC SURGERY | Admitting: ORTHOPAEDIC SURGERY
Payer: COMMERCIAL

## 2023-03-28 ENCOUNTER — APPOINTMENT (OUTPATIENT)
Dept: GENERAL RADIOLOGY | Age: 63
End: 2023-03-28
Attending: ORTHOPAEDIC SURGERY
Payer: COMMERCIAL

## 2023-03-28 ENCOUNTER — ANESTHESIA (OUTPATIENT)
Dept: OPERATING ROOM | Age: 63
End: 2023-03-28
Payer: COMMERCIAL

## 2023-03-28 VITALS
HEIGHT: 67 IN | SYSTOLIC BLOOD PRESSURE: 168 MMHG | DIASTOLIC BLOOD PRESSURE: 77 MMHG | BODY MASS INDEX: 23.23 KG/M2 | OXYGEN SATURATION: 100 % | HEART RATE: 93 BPM | TEMPERATURE: 97.8 F | WEIGHT: 148 LBS | RESPIRATION RATE: 16 BRPM

## 2023-03-28 DIAGNOSIS — Z96.649 STATUS POST TOTAL REPLACEMENT OF HIP, UNSPECIFIED LATERALITY: Primary | ICD-10-CM

## 2023-03-28 PROBLEM — M16.12 PRIMARY OSTEOARTHRITIS OF LEFT HIP: Status: ACTIVE | Noted: 2023-03-28

## 2023-03-28 PROCEDURE — 97530 THERAPEUTIC ACTIVITIES: CPT

## 2023-03-28 PROCEDURE — 2500000003 HC RX 250 WO HCPCS: Performed by: NURSE ANESTHETIST, CERTIFIED REGISTERED

## 2023-03-28 PROCEDURE — 7100000010 HC PHASE II RECOVERY - FIRST 15 MIN: Performed by: ORTHOPAEDIC SURGERY

## 2023-03-28 PROCEDURE — 27130 TOTAL HIP ARTHROPLASTY: CPT | Performed by: ORTHOPAEDIC SURGERY

## 2023-03-28 PROCEDURE — 3600000005 HC SURGERY LEVEL 5 BASE: Performed by: ORTHOPAEDIC SURGERY

## 2023-03-28 PROCEDURE — 2709999900 HC NON-CHARGEABLE SUPPLY: Performed by: ORTHOPAEDIC SURGERY

## 2023-03-28 PROCEDURE — APPNB45 APP NON BILLABLE 31-45 MINUTES: Performed by: NURSE PRACTITIONER

## 2023-03-28 PROCEDURE — 2500000003 HC RX 250 WO HCPCS: Performed by: ORTHOPAEDIC SURGERY

## 2023-03-28 PROCEDURE — 6360000002 HC RX W HCPCS: Performed by: NURSE ANESTHETIST, CERTIFIED REGISTERED

## 2023-03-28 PROCEDURE — 6370000000 HC RX 637 (ALT 250 FOR IP): Performed by: ORTHOPAEDIC SURGERY

## 2023-03-28 PROCEDURE — 6360000002 HC RX W HCPCS: Performed by: ORTHOPAEDIC SURGERY

## 2023-03-28 PROCEDURE — 3700000001 HC ADD 15 MINUTES (ANESTHESIA): Performed by: ORTHOPAEDIC SURGERY

## 2023-03-28 PROCEDURE — A4217 STERILE WATER/SALINE, 500 ML: HCPCS | Performed by: ORTHOPAEDIC SURGERY

## 2023-03-28 PROCEDURE — 2580000003 HC RX 258: Performed by: ORTHOPAEDIC SURGERY

## 2023-03-28 PROCEDURE — 3700000000 HC ANESTHESIA ATTENDED CARE: Performed by: ORTHOPAEDIC SURGERY

## 2023-03-28 PROCEDURE — 99024 POSTOP FOLLOW-UP VISIT: CPT | Performed by: NURSE PRACTITIONER

## 2023-03-28 PROCEDURE — 7100000000 HC PACU RECOVERY - FIRST 15 MIN: Performed by: ORTHOPAEDIC SURGERY

## 2023-03-28 PROCEDURE — 6370000000 HC RX 637 (ALT 250 FOR IP): Performed by: ANESTHESIOLOGY

## 2023-03-28 PROCEDURE — 97161 PT EVAL LOW COMPLEX 20 MIN: CPT

## 2023-03-28 PROCEDURE — 97165 OT EVAL LOW COMPLEX 30 MIN: CPT

## 2023-03-28 PROCEDURE — 3600000015 HC SURGERY LEVEL 5 ADDTL 15MIN: Performed by: ORTHOPAEDIC SURGERY

## 2023-03-28 PROCEDURE — 72170 X-RAY EXAM OF PELVIS: CPT

## 2023-03-28 PROCEDURE — 7100000011 HC PHASE II RECOVERY - ADDTL 15 MIN: Performed by: ORTHOPAEDIC SURGERY

## 2023-03-28 PROCEDURE — 97116 GAIT TRAINING THERAPY: CPT

## 2023-03-28 PROCEDURE — 73502 X-RAY EXAM HIP UNI 2-3 VIEWS: CPT

## 2023-03-28 PROCEDURE — 2720000010 HC SURG SUPPLY STERILE: Performed by: ORTHOPAEDIC SURGERY

## 2023-03-28 PROCEDURE — C1776 JOINT DEVICE (IMPLANTABLE): HCPCS | Performed by: ORTHOPAEDIC SURGERY

## 2023-03-28 PROCEDURE — 7100000001 HC PACU RECOVERY - ADDTL 15 MIN: Performed by: ORTHOPAEDIC SURGERY

## 2023-03-28 DEVICE — AVENIR COMPLETE STANDARD COLLARED SIZE 4: Type: IMPLANTABLE DEVICE | Site: HIP | Status: FUNCTIONAL

## 2023-03-28 DEVICE — SHELL ACET SZ D DIA50MM 3 H OSSEOTI LIMIT H 2 MOBILITY G7: Type: IMPLANTABLE DEVICE | Site: HIP | Status: FUNCTIONAL

## 2023-03-28 DEVICE — BIOLOX® DELTA, CERAMIC FEMORAL HEAD, M, Ø 36/0, TAPER 12/14
Type: IMPLANTABLE DEVICE | Site: HIP | Status: FUNCTIONAL
Brand: BIOLOX® DELTA

## 2023-03-28 DEVICE — LINER ACET 36 MM HIP NEUT POLYETH G7 VIVACIT E: Type: IMPLANTABLE DEVICE | Site: HIP | Status: FUNCTIONAL

## 2023-03-28 RX ORDER — DEXAMETHASONE SODIUM PHOSPHATE 4 MG/ML
INJECTION, SOLUTION INTRA-ARTICULAR; INTRALESIONAL; INTRAMUSCULAR; INTRAVENOUS; SOFT TISSUE PRN
Status: DISCONTINUED | OUTPATIENT
Start: 2023-03-28 | End: 2023-03-28 | Stop reason: SDUPTHER

## 2023-03-28 RX ORDER — LIDOCAINE HYDROCHLORIDE 20 MG/ML
INJECTION, SOLUTION INFILTRATION; PERINEURAL PRN
Status: DISCONTINUED | OUTPATIENT
Start: 2023-03-28 | End: 2023-03-28 | Stop reason: SDUPTHER

## 2023-03-28 RX ORDER — SODIUM CHLORIDE, SODIUM LACTATE, POTASSIUM CHLORIDE, CALCIUM CHLORIDE 600; 310; 30; 20 MG/100ML; MG/100ML; MG/100ML; MG/100ML
INJECTION, SOLUTION INTRAVENOUS CONTINUOUS
Status: DISCONTINUED | OUTPATIENT
Start: 2023-03-28 | End: 2023-03-28 | Stop reason: HOSPADM

## 2023-03-28 RX ORDER — OXYCODONE HYDROCHLORIDE 5 MG/1
5 TABLET ORAL
Status: COMPLETED | OUTPATIENT
Start: 2023-03-28 | End: 2023-03-28

## 2023-03-28 RX ORDER — OXYCODONE HYDROCHLORIDE 5 MG/1
5 TABLET ORAL EVERY 4 HOURS PRN
Qty: 42 TABLET | Refills: 0 | Status: SHIPPED | OUTPATIENT
Start: 2023-03-28 | End: 2023-04-04

## 2023-03-28 RX ORDER — PHENYLEPHRINE HCL IN 0.9% NACL 1 MG/10 ML
SYRINGE (ML) INTRAVENOUS PRN
Status: DISCONTINUED | OUTPATIENT
Start: 2023-03-28 | End: 2023-03-28 | Stop reason: SDUPTHER

## 2023-03-28 RX ORDER — DEXAMETHASONE SODIUM PHOSPHATE 4 MG/ML
8 INJECTION, SOLUTION INTRA-ARTICULAR; INTRALESIONAL; INTRAMUSCULAR; INTRAVENOUS; SOFT TISSUE ONCE
Status: DISCONTINUED | OUTPATIENT
Start: 2023-03-28 | End: 2023-03-28 | Stop reason: HOSPADM

## 2023-03-28 RX ORDER — PROCHLORPERAZINE EDISYLATE 5 MG/ML
5 INJECTION INTRAMUSCULAR; INTRAVENOUS
Status: DISCONTINUED | OUTPATIENT
Start: 2023-03-28 | End: 2023-03-28 | Stop reason: HOSPADM

## 2023-03-28 RX ORDER — KETOROLAC TROMETHAMINE 30 MG/ML
15 INJECTION, SOLUTION INTRAMUSCULAR; INTRAVENOUS ONCE
Status: DISCONTINUED | OUTPATIENT
Start: 2023-03-28 | End: 2023-03-28 | Stop reason: HOSPADM

## 2023-03-28 RX ORDER — MAGNESIUM HYDROXIDE 1200 MG/15ML
LIQUID ORAL CONTINUOUS PRN
Status: COMPLETED | OUTPATIENT
Start: 2023-03-28 | End: 2023-03-28

## 2023-03-28 RX ORDER — MELOXICAM 15 MG/1
15 TABLET ORAL DAILY
Qty: 14 TABLET | Refills: 0 | Status: SHIPPED | OUTPATIENT
Start: 2023-03-28 | End: 2023-04-11

## 2023-03-28 RX ORDER — FENTANYL CITRATE 50 UG/ML
25 INJECTION, SOLUTION INTRAMUSCULAR; INTRAVENOUS EVERY 5 MIN PRN
Status: DISCONTINUED | OUTPATIENT
Start: 2023-03-28 | End: 2023-03-28 | Stop reason: HOSPADM

## 2023-03-28 RX ORDER — CELECOXIB 200 MG/1
400 CAPSULE ORAL ONCE
Status: COMPLETED | OUTPATIENT
Start: 2023-03-28 | End: 2023-03-28

## 2023-03-28 RX ORDER — SODIUM CHLORIDE 0.9 % (FLUSH) 0.9 %
5-40 SYRINGE (ML) INJECTION EVERY 12 HOURS SCHEDULED
Status: CANCELLED | OUTPATIENT
Start: 2023-03-28

## 2023-03-28 RX ORDER — ACETAMINOPHEN 500 MG
1000 TABLET ORAL 3 TIMES DAILY
Qty: 84 TABLET | Refills: 0 | Status: SHIPPED | OUTPATIENT
Start: 2023-03-28 | End: 2023-04-11

## 2023-03-28 RX ORDER — GLYCOPYRROLATE 0.2 MG/ML
INJECTION INTRAMUSCULAR; INTRAVENOUS PRN
Status: DISCONTINUED | OUTPATIENT
Start: 2023-03-28 | End: 2023-03-28 | Stop reason: SDUPTHER

## 2023-03-28 RX ORDER — HYDROMORPHONE HCL 110MG/55ML
0.5 PATIENT CONTROLLED ANALGESIA SYRINGE INTRAVENOUS EVERY 5 MIN PRN
Status: DISCONTINUED | OUTPATIENT
Start: 2023-03-28 | End: 2023-03-28 | Stop reason: HOSPADM

## 2023-03-28 RX ORDER — SENNA PLUS 8.6 MG/1
1 TABLET ORAL 2 TIMES DAILY
Qty: 28 TABLET | Refills: 0 | Status: SHIPPED | OUTPATIENT
Start: 2023-03-28 | End: 2023-04-11

## 2023-03-28 RX ORDER — PROPOFOL 10 MG/ML
INJECTION, EMULSION INTRAVENOUS PRN
Status: DISCONTINUED | OUTPATIENT
Start: 2023-03-28 | End: 2023-03-28 | Stop reason: SDUPTHER

## 2023-03-28 RX ORDER — LIDOCAINE HYDROCHLORIDE 10 MG/ML
1 INJECTION, SOLUTION EPIDURAL; INFILTRATION; INTRACAUDAL; PERINEURAL
Status: DISCONTINUED | OUTPATIENT
Start: 2023-03-28 | End: 2023-03-28 | Stop reason: HOSPADM

## 2023-03-28 RX ORDER — ACETAMINOPHEN 500 MG
1000 TABLET ORAL ONCE
Status: COMPLETED | OUTPATIENT
Start: 2023-03-28 | End: 2023-03-28

## 2023-03-28 RX ORDER — PROPOFOL 10 MG/ML
INJECTION, EMULSION INTRAVENOUS CONTINUOUS PRN
Status: DISCONTINUED | OUTPATIENT
Start: 2023-03-28 | End: 2023-03-28 | Stop reason: SDUPTHER

## 2023-03-28 RX ORDER — LIDOCAINE HYDROCHLORIDE 10 MG/ML
0.5 INJECTION, SOLUTION EPIDURAL; INFILTRATION; INTRACAUDAL; PERINEURAL ONCE
Status: DISCONTINUED | OUTPATIENT
Start: 2023-03-28 | End: 2023-03-28 | Stop reason: HOSPADM

## 2023-03-28 RX ORDER — SODIUM CHLORIDE 0.9 % (FLUSH) 0.9 %
5-40 SYRINGE (ML) INJECTION EVERY 12 HOURS SCHEDULED
Status: DISCONTINUED | OUTPATIENT
Start: 2023-03-28 | End: 2023-03-28 | Stop reason: HOSPADM

## 2023-03-28 RX ORDER — ONDANSETRON 2 MG/ML
4 INJECTION INTRAMUSCULAR; INTRAVENOUS
Status: DISCONTINUED | OUTPATIENT
Start: 2023-03-28 | End: 2023-03-28 | Stop reason: HOSPADM

## 2023-03-28 RX ORDER — MIDAZOLAM HYDROCHLORIDE 1 MG/ML
INJECTION INTRAMUSCULAR; INTRAVENOUS PRN
Status: DISCONTINUED | OUTPATIENT
Start: 2023-03-28 | End: 2023-03-28 | Stop reason: SDUPTHER

## 2023-03-28 RX ORDER — SODIUM CHLORIDE 0.9 % (FLUSH) 0.9 %
5-40 SYRINGE (ML) INJECTION PRN
Status: DISCONTINUED | OUTPATIENT
Start: 2023-03-28 | End: 2023-03-28 | Stop reason: HOSPADM

## 2023-03-28 RX ORDER — SODIUM CHLORIDE 0.9 % (FLUSH) 0.9 %
5-40 SYRINGE (ML) INJECTION PRN
Status: CANCELLED | OUTPATIENT
Start: 2023-03-28

## 2023-03-28 RX ORDER — BUPIVACAINE HYDROCHLORIDE 7.5 MG/ML
INJECTION, SOLUTION EPIDURAL; RETROBULBAR
Status: COMPLETED | OUTPATIENT
Start: 2023-03-28 | End: 2023-03-28

## 2023-03-28 RX ORDER — SODIUM CHLORIDE 9 MG/ML
25 INJECTION, SOLUTION INTRAVENOUS PRN
Status: DISCONTINUED | OUTPATIENT
Start: 2023-03-28 | End: 2023-03-28 | Stop reason: HOSPADM

## 2023-03-28 RX ORDER — VANCOMYCIN HYDROCHLORIDE 1 G/20ML
INJECTION, POWDER, LYOPHILIZED, FOR SOLUTION INTRAVENOUS
Status: COMPLETED | OUTPATIENT
Start: 2023-03-28 | End: 2023-03-28

## 2023-03-28 RX ORDER — TRANEXAMIC ACID 10 MG/ML
1000 INJECTION, SOLUTION INTRAVENOUS
Status: COMPLETED | OUTPATIENT
Start: 2023-03-28 | End: 2023-03-28

## 2023-03-28 RX ORDER — LABETALOL HYDROCHLORIDE 5 MG/ML
10 INJECTION, SOLUTION INTRAVENOUS
Status: DISCONTINUED | OUTPATIENT
Start: 2023-03-28 | End: 2023-03-28 | Stop reason: HOSPADM

## 2023-03-28 RX ORDER — HYDRALAZINE HYDROCHLORIDE 20 MG/ML
10 INJECTION INTRAMUSCULAR; INTRAVENOUS
Status: DISCONTINUED | OUTPATIENT
Start: 2023-03-28 | End: 2023-03-28 | Stop reason: HOSPADM

## 2023-03-28 RX ORDER — SODIUM CHLORIDE 9 MG/ML
INJECTION, SOLUTION INTRAVENOUS PRN
Status: DISCONTINUED | OUTPATIENT
Start: 2023-03-28 | End: 2023-03-28 | Stop reason: HOSPADM

## 2023-03-28 RX ORDER — SODIUM CHLORIDE 9 MG/ML
INJECTION, SOLUTION INTRAVENOUS PRN
Status: CANCELLED | OUTPATIENT
Start: 2023-03-28

## 2023-03-28 RX ORDER — EPHEDRINE SULFATE/0.9% NACL/PF 50 MG/5 ML
SYRINGE (ML) INTRAVENOUS PRN
Status: DISCONTINUED | OUTPATIENT
Start: 2023-03-28 | End: 2023-03-28 | Stop reason: SDUPTHER

## 2023-03-28 RX ORDER — ASPIRIN 81 MG/1
81 TABLET ORAL 2 TIMES DAILY
Qty: 60 TABLET | Refills: 0 | Status: SHIPPED | OUTPATIENT
Start: 2023-03-28 | End: 2023-04-27

## 2023-03-28 RX ADMIN — CELECOXIB 400 MG: 200 CAPSULE ORAL at 06:14

## 2023-03-28 RX ADMIN — PROPOFOL 100 MG: 10 INJECTION, EMULSION INTRAVENOUS at 07:47

## 2023-03-28 RX ADMIN — Medication 200 MCG: at 08:38

## 2023-03-28 RX ADMIN — LIDOCAINE HYDROCHLORIDE 30 MG: 20 INJECTION, SOLUTION INFILTRATION; PERINEURAL at 07:44

## 2023-03-28 RX ADMIN — CEFAZOLIN 2000 MG: 2 INJECTION, POWDER, FOR SOLUTION INTRAMUSCULAR; INTRAVENOUS at 07:24

## 2023-03-28 RX ADMIN — PROPOFOL 125 MCG/KG/MIN: 10 INJECTION, EMULSION INTRAVENOUS at 07:44

## 2023-03-28 RX ADMIN — TRANEXAMIC ACID 1000 MG: 10 INJECTION, SOLUTION INTRAVENOUS at 09:12

## 2023-03-28 RX ADMIN — SODIUM CHLORIDE, POTASSIUM CHLORIDE, SODIUM LACTATE AND CALCIUM CHLORIDE: 600; 310; 30; 20 INJECTION, SOLUTION INTRAVENOUS at 07:16

## 2023-03-28 RX ADMIN — TRANEXAMIC ACID 1000 MG: 10 INJECTION, SOLUTION INTRAVENOUS at 07:42

## 2023-03-28 RX ADMIN — Medication 200 MCG: at 08:29

## 2023-03-28 RX ADMIN — Medication 200 MCG: at 08:20

## 2023-03-28 RX ADMIN — Medication 50 MCG: at 08:07

## 2023-03-28 RX ADMIN — Medication 50 MCG: at 07:56

## 2023-03-28 RX ADMIN — ACETAMINOPHEN 1000 MG: 500 TABLET ORAL at 06:14

## 2023-03-28 RX ADMIN — Medication 10 MG: at 08:51

## 2023-03-28 RX ADMIN — Medication 200 MCG: at 08:35

## 2023-03-28 RX ADMIN — GLYCOPYRROLATE 0.2 MG: 0.2 INJECTION INTRAMUSCULAR; INTRAVENOUS at 07:32

## 2023-03-28 RX ADMIN — Medication 100 MCG: at 08:09

## 2023-03-28 RX ADMIN — DEXAMETHASONE SODIUM PHOSPHATE 10 MG: 4 INJECTION, SOLUTION INTRAMUSCULAR; INTRAVENOUS at 08:02

## 2023-03-28 RX ADMIN — Medication 20 MG: at 08:44

## 2023-03-28 RX ADMIN — OXYCODONE HYDROCHLORIDE 5 MG: 5 TABLET ORAL at 09:52

## 2023-03-28 RX ADMIN — Medication 100 MCG: at 08:15

## 2023-03-28 RX ADMIN — MIDAZOLAM 2 MG: 1 INJECTION INTRAMUSCULAR; INTRAVENOUS at 07:32

## 2023-03-28 RX ADMIN — Medication 20 MG: at 09:00

## 2023-03-28 RX ADMIN — Medication 100 MCG: at 09:00

## 2023-03-28 RX ADMIN — Medication 100 MCG: at 08:51

## 2023-03-28 RX ADMIN — BUPIVACAINE HYDROCHLORIDE 9 MG: 7.5 INJECTION, SOLUTION EPIDURAL; RETROBULBAR at 07:40

## 2023-03-28 RX ADMIN — PROPOFOL 50 MG: 10 INJECTION, EMULSION INTRAVENOUS at 07:44

## 2023-03-28 ASSESSMENT — PAIN DESCRIPTION - ORIENTATION
ORIENTATION: LEFT
ORIENTATION: LEFT
ORIENTATION: LOWER
ORIENTATION: LEFT
ORIENTATION: LOWER

## 2023-03-28 ASSESSMENT — PAIN DESCRIPTION - DESCRIPTORS
DESCRIPTORS: DISCOMFORT

## 2023-03-28 ASSESSMENT — ENCOUNTER SYMPTOMS: SHORTNESS OF BREATH: 0

## 2023-03-28 ASSESSMENT — PAIN DESCRIPTION - LOCATION
LOCATION: BACK
LOCATION: BACK;HIP
LOCATION: HIP
LOCATION: HIP
LOCATION: BACK

## 2023-03-28 ASSESSMENT — PAIN DESCRIPTION - PAIN TYPE
TYPE: SURGICAL PAIN

## 2023-03-28 ASSESSMENT — PAIN SCALES - GENERAL
PAINLEVEL_OUTOF10: 4
PAINLEVEL_OUTOF10: 5
PAINLEVEL_OUTOF10: 4

## 2023-03-28 ASSESSMENT — PAIN - FUNCTIONAL ASSESSMENT
PAIN_FUNCTIONAL_ASSESSMENT: 0-10
PAIN_FUNCTIONAL_ASSESSMENT: 0-10

## 2023-03-28 NOTE — INTERVAL H&P NOTE
Update History & Physical    The patient's History and Physical of March 23, 2023 was reviewed with the patient and I examined the patient. There was no change. The surgical site was confirmed by the patient and me. Plan: The risks, benefits, expected outcome, and alternative to the recommended procedure have been discussed with the patient. Patient understands and wants to proceed with the procedure.      Electronically signed by Danielle Duffy MD on 3/28/2023 at 7:06 AM

## 2023-03-28 NOTE — ANESTHESIA POSTPROCEDURE EVALUATION
Department of Anesthesiology  Postprocedure Note    Patient: Elly Pope  MRN: [id number]  YOB: 1960  Date of evaluation: 3/28/2023      Procedure Summary     Date: 03/28/23 Room / Location: [address]    Anesthesia Start: 0732 Anesthesia Stop: 7671    Procedure: LEFT TOTAL HIP REPLACEMENT-DIRECT ANTERIOR; Voncile Deed (Left: Hip) Diagnosis:       Arthritis of left hip      (Arthritis of left hip [M16.12])    Surgeons: Eleanor Plaza MD Responsible Provider: Jimmy Deleon MD    Anesthesia Type: general ASA Status: 3          Anesthesia Type: No value filed.     Alfonso Phase I: Alfonso Score: 10    Alfonso Phase II:        Anesthesia Post Evaluation    Patient location during evaluation: PACU  Patient participation: complete - patient participated  Level of consciousness: awake and alert  Pain score: 2  Airway patency: patent  Nausea & Vomiting: no vomiting  Complications: no  Cardiovascular status: blood pressure returned to baseline  Respiratory status: acceptable  Hydration status: euvolemic  Multimodal analgesia pain management approach

## 2023-03-28 NOTE — ANESTHESIA PROCEDURE NOTES
Spinal Block    Patient location during procedure: OR  End time: 3/28/2023 7:41 AM  Reason for block: primary anesthetic and at surgeon's request  Staffing  Performed: resident/CRNA   Resident/CRNA: VARUN Campa CRNA  Spinal Block  Patient position: sitting  Prep: ChloraPrep  Patient monitoring: cardiac monitor, continuous pulse ox, continuous capnometry, frequent blood pressure checks and oxygen  Approach: midline  Location: L2/L3  Provider prep: mask and sterile gloves  Local infiltration: lidocaine  Needle  Needle type: Pencan   Needle gauge: 25 G  Needle length: 3.5 in  Assessment  Sensory level: T10  Swirl obtained: Yes  CSF: clear  Attempts: 1  Hemodynamics: stable  Preanesthetic Checklist  Completed: patient identified, IV checked, site marked, risks and benefits discussed, surgical/procedural consents, equipment checked, pre-op evaluation, timeout performed, anesthesia consent given, oxygen available, monitors applied/VS acknowledged, fire risk safety assessment completed and verbalized and blood product R/B/A discussed and consented

## 2023-03-28 NOTE — DISCHARGE INSTRUCTIONS
Dr. Elmer Shah Total HIP Replacement Instructions      Follow-up with Dr. Elmer Shah in 2 weeks; [phone] ([facility])    Incision care  Should your incision start to drain let our office know. Remove ace and cotton wrap tomorrow, the first day after surgery, then place mitchell hose on the operative leg. Okay to shower tomorrow, as long as the incision remains covered with waterproof dressing (your post-op incision is). Do not tub bathe or submerge your incision in water. Should your incision start to drain, let our office know. You may remove your Therabond dressing on post-op day 7. Prior to any wound care please wash and dry your hands. During the day, continue to wear your MITCHELL stocking on the operative leg. Take the stocking off at night. You do not need to wear a stocking on your non-operative leg. You should wear the compression stocking until your post-op visit, this keeps lower extremity swelling to a minimum and reduces joint stiffness. Exercise  Physical therapy will be started right away and should be set up prior to your surgery. PT will be 2-3 times a week for 4-6 weeks. Some patients will start this at home for two weeks then transition to outpatient PT, while others will go straight to outpatient PT the day after surgery. Unless told otherwise, your operative leg is \"weight bearing as tolerated\". This means you can put all of your weight on your surgery leg. You may progress off support as tolerated. Elevate your operative leg to help with swelling. Dr. Elmer Shah wants his total joint patients to walk for 5-10 minutes every hour while awake. Ask your surgeon's office for  FMLA paperwork or a return to work note. Ice  For pain and swelling, put ice or a cold pack on the area for 10 to 20 minutes at a time. Put a thin cloth between the ice and your skin such as a clean pillow case or thin towel.     Diet  Resume your home diet  Prevent constipation    Medicines   Upon discharge

## 2023-03-28 NOTE — ANESTHESIA PRE PROCEDURE
PREGTESTUR Negative 02/22/2017        ABGs: No results found for: PHART, PO2ART, TIV7KSG, RNH6EWZ, BEART, B5AMCCEL     Type & Screen (If Applicable):  No results found for: LABABO, LABRH    Drug/Infectious Status (If Applicable):  No results found for: HIV, HEPCAB    COVID-19 Screening (If Applicable): No results found for: COVID19        Anesthesia Evaluation  Patient summary reviewed and Nursing notes reviewed no history of anesthetic complications:   Airway: Mallampati: II  TM distance: >3 FB   Neck ROM: full  Mouth opening: > = 3 FB   Dental: normal exam         Pulmonary:   (+) COPD:  asthma:     (-) shortness of breath                           Cardiovascular:    (+) hypertension:,     (-)  angina                Neuro/Psych:   (+) psychiatric history:   (-) CVA           GI/Hepatic/Renal:   (+) hiatal hernia, GERD:, liver disease:,           Endo/Other:        (-) diabetes mellitus, hypothyroidism               Abdominal:             Vascular:     - PVD. Other Findings:           Anesthesia Plan      general     ASA 3       Induction: intravenous. MIPS: Postoperative opioids intended and Prophylactic antiemetics administered. Anesthetic plan and risks discussed with patient. Use of blood products discussed with patient whom. Plan discussed with CRNA.                     James Flores MD   3/28/2023

## 2023-03-28 NOTE — PROGRESS NOTES
DC instructions given to pt and pts daughter Tara Swanson. All questions answered with understanding.
Discharge instructions reviewed with patient and pts daughter Bonnie Garrido. All home medications have been reviewed, pt v/u. Discharge instructions signed. Pt discharged via wheelchair. Pt discharged with walker and all belongings. Daughter Bonnie Garrido taking stable pt home.
Dr Rufino Whelan at bedside to speak with patient.
Ellie Ward NP at bedside to speak with patient and pts daughter Wilfrid Laurent.
Name_______________________________________Printed:____________________  Date and time of surgery__3/28/23 @ 0730______________________Ar[place] Time:__[facility]   1. The instructions given regarding when and if a patient needs to stop oral intake prior to surgery varies. Follow the specific instructions you were given                  __x_Nothing to eat or to drink after Midnight the night before.                   ____Carbo loading or instructions will be given to select patients-if you have been given those instructions -please do the following                           The evening before your surgery after dinner before midnight drink 40 ounces of gatorade. If you are diabetic use sugar free. The morning of surgery drink 40 ounces of water. This needs to be finished 3 hours prior to your surgery start time. 2. Take the following pills with a small sip of water on the morning of surgery__spiriva,_metoprolol, singulair, omeprazole  __bring inhaler  ______________________________________________                  Do not take blood pressure medications ending in pril or sartan the arabella prior to surgery or the morning of surgery. Dr Aaron Nair patient are not to take any medications the AM of surgery. 3. Aspirin, Ibuprofen, Advil, Naproxen, Vitamin E and other Anti-inflammatory products and supplements should be stopped for 5 -7days before surgery or as directed by your physician. 4. Check with your Doctor regarding stopping Plavix, Coumadin,Eliquis, Lovenox,Effient,Pradaxa,Xarelto, Fragmin or other blood thinners and follow their instructions. 5. Do not smoke, and do not drink any alcoholic beverages 24 hours prior to surgery. This includes NA Beer. Refrain from the usage of any recreational drugs. 6. You may brush your teeth and gargle the morning of surgery. DO NOT SWALLOW WATER   7.  You MUST make arrangements for a responsible adult to stay on site while you are here and take you home
PT/OT at bedside for eval.
[facility] [address] Department   Phone: [phone]    Occupational Therapy    [x] Initial Evaluation            [] Daily Treatment Note         [x] Discharge Summary      Patient: Sallie Mitchell   : 1960   MRN: [id number]   Date of Service:  3/28/2023    Admitting Diagnosis:  <principal problem not specified>  Current Admission Summary: pt s/p L LUIS on 3/28/2023. Past Medical History:  has a past medical history of Alcohol problem drinking, Alcohol withdrawal delirium ([facility] [address] Anxiety, Facial bones, closed fracture ([facility] [address] Hypertension, Hyponatremia, Malignant hypertension, and Panic attacks. Past Surgical History:  has a past surgical history that includes Neck surgery (); Tubal ligation; Wrist ganglion excision (); other surgical history; Colonoscopy (2018); and Colonoscopy (N/A, 2018). Discharge Recommendations: Sallie Mitchell scored a 23/24 on the AM-PAC ADL Inpatient form. At this time, no further OT is recommended upon discharge due to patient at independent level. Recommend patient returns to prior setting with assist as needed.       DME Required For Discharge: No DME required    Precautions/Restrictions: no restrictions  Positional Restrictions:no positional restrictions    Pre-Admission Information   Lives With: daughter               Type of Home: house  Home Layout: one level, laundry in basement  Home Access:  4 steps from the front with L handail, 3 steps into back without handrail   Bathroom Layout: tub/shower unit  Bathroom Equipment: grab bars around toilet, hand held shower head  Toilet Height: standard height  Home Equipment: single point cane  Transfer Assistance: Independent without use of device  Ambulation Assistance:Independent without use of device  ADL Assistance: independent with all ADL's  IADL Assistance: requires assistance with laundry  Active :        [x] Yes                 [] No  Hand Dominance: [] Left
Pt able to start lifting leg, but still feels numb from spinal. Offered pt meal tray, pt declines at this time. Eating steven crackers and having pepsi. Tolerating PO.
Pt arrived from OR to PACU bay 2. Report received from OR staff. Pt arousable to voice. Surgical incisions dressings in place to L hip. Pt on 2L 02, NSR, and VSS. Will continue to monitor.
Pt awake and alert at this time. Pt on RA, and VSS. Pt medicated for pain and denies nausea, tolerating PO. Skin warm and dry, palpable pulses and able to wiggle toes. Pt meets criteria to be discharged from Phase 1.
Pt resting in bed. No needs at this time. Awaiting PT/OT eval. Pt daughter at beside.
Teaching / education initiated regarding perioperative experience, expectations, and pain management during stay. Patient verbalized understanding.
11:15 AM Yazan Kumar MD W CHEST ORTH [facility]   5/3/2023  3:45 PM MD DAVIE Arias NASO MARI AFL NASO   5/10/2023  9:20 AM Yolanda Link MD [facility] Luis Laura, APRN - CNP  3/28/2023  9:22 AM    Donnamae Duane was evaluated today and a DME order was entered for a wheeled walker because she requires this to successfully complete daily living tasks of ambulating. A wheeled walker is necessary due to the patient's unsteady gait, upper body weakness, and inability to  an ambulation device; and she can ambulate only by pushing a walker instead of a lesser assistive device such as a cane, crutch, or standard walker. The need for this equipment was discussed with the patient and she understands and is in agreement.
decreased strength, decreased endurance, decreased balance, increased pain  Prognosis: good  Clinical Assessment: Patient is presenting s/p left total hip arthroplasty. Patient is relatively limited in her functional mobility due to the previously listed impairments. However, patient was able to demonstrate safety with mobility, transfers, and ADLs to D/C home until further care can be provided. Planned d/c this date. Safety Interventions:  Patient was discharged following evaluation. Patient transferred into car with daughter. Plan  Frequency: Eval with same day discharge. No follow up required. Current Treatment Recommendations: not applicable, evaluation completed with same day discharge. Goals  Patient Goals:    Short Term Goals:  Time Frame:   Patient eval with same day discharge. No goals set secondary to pending assessment at next level of care. Therapy Session Time      Individual Group Co-treatment   Time In     1310   Time Out     1356   Minutes     46     Timed Code Treatment Minutes:  15  Total Treatment Minutes:  46   Units billed shared with OT due to observation status. Rochelle Merritt, SPT    Electronically Signed By: Nayeli Caldera PT    Therapist observed and directed the above evaluation.  Thanks, Cory Hernandez, DPT [id number]

## 2023-03-28 NOTE — DISCHARGE INSTR - COC
Readings from Last 1 Encounters:   03/28/23 148 lb (67.1 kg)     Mental Status:  {IP PT MENTAL STATUS:20030}    IV Access:  { MAIA IV ACCESS:228921927}    Nursing Mobility/ADLs:  Walking   {CHP DME PXCS:584883962}  Transfer  {CHP DME FESH:742471245}  Bathing  {CHP DME CJGR:965774994}  Dressing  {CHP DME DOCN:213403919}  Toileting  {CHP DME GJAT:792932136}  Feeding  {CHP DME XMYN:223437358}  Med Admin  {CHP DME PGFB:162133626}  Med Delivery   { MAIA MED Delivery:463795074}    Wound Care Documentation and Therapy:  Wound 02/13/17 Abrasion(s) Knee Left 1.7x1.5 abrasion (Active)   Number of days: 3431       Wound 02/13/17 Other (Comment) Hand Left most trauma to thumb and first finger (Active)   Number of days: 2233       Incision 03/28/23 Hip Left (Active)   Number of days: 0        Elimination:  Continence: Bowel: {YES / IY:46840}  Bladder: {YES / QW:23086}  Urinary Catheter: {Urinary Catheter:936146927}   Colostomy/Ileostomy/Ileal Conduit: {YES / OK:61524}       Date of Last BM: ***    Intake/Output Summary (Last 24 hours) at 3/28/2023 0923  Last data filed at 3/28/2023 0912  Gross per 24 hour   Intake 1200 ml   Output --   Net 1200 ml     No intake/output data recorded.     Safety Concerns:     [facility] Safety Concerns:713558101}    Impairments/Disabilities:      [facility] Impairments/Disabilities:717446749}    Nutrition Therapy:  Current Nutrition Therapy:   [facility] Diet List:677303683}    Routes of Feeding: {P DME Other Feedings:278698473}  Liquids: {Slp liquid thickness:26473}  Daily Fluid Restriction: {CHP DME Yes amt example:840269480}  Last Modified Barium Swallow with Video (Video Swallowing Test): {Done Not Done IGWB:970786300}    Treatments at the Time of Hospital Discharge:   Respiratory Treatments: ***  Oxygen Therapy:  {Therapy; copd oxygen:07276}  Ventilator:    { CC Vent WOBO:145364972}    Rehab Therapies: Physical Therapy  Weight Bearing Status/Restrictions: No weight bearing restrictions  Other

## 2023-03-29 ENCOUNTER — TELEPHONE (OUTPATIENT)
Dept: ORTHOPEDIC SURGERY | Age: 63
End: 2023-03-29

## 2023-03-29 NOTE — TELEPHONE ENCOUNTER
General Question     Subject: PHYSICAL THERAPY  Patient and /or Facility Request: Joan Dominguez  Contact Number:  [phone]    23 [facility] CALLING TO CONFIRM [facility] ORDERS FOR PHYSICAL THERAPY FOR THE PATIENT, EB STATED THAT SHE JUST LEFT THE PATIENT TO SET UP THE PHYSICAL THERAPY.      PLEASE CALL [address] [phone] IF YOU REACH THE VOICEMAIL [address] STATED THAT YOU MAY THE A VERBAL ORDER OVER THE PHONE

## 2023-03-29 NOTE — OP NOTE
monocryl with dermabond. A sterile dressing was applied. The drapes were taken down and the patient was transferred to the hospital bed. The patient was transported to the Recovery Room in comfortable and stable condition. All counts were correct at the completion of the case. ATTESTATION: Dr. Odalys Maddox was present and scrubbed for the entire procedure.

## 2023-03-29 NOTE — TELEPHONE ENCOUNTER
Returned phone call to [employer] [facility].  Gave verbal orders for home PT on VM

## 2023-04-27 DIAGNOSIS — I73.00 RAYNAUD'S PHENOMENON WITHOUT GANGRENE: ICD-10-CM

## 2023-04-27 DIAGNOSIS — I10 ESSENTIAL HYPERTENSION: ICD-10-CM

## 2023-04-27 RX ORDER — NIFEDIPINE 30 MG/1
30 TABLET, EXTENDED RELEASE ORAL NIGHTLY
Qty: 90 TABLET | Refills: 1 | Status: SHIPPED | OUTPATIENT
Start: 2023-04-27

## 2023-05-10 ENCOUNTER — OFFICE VISIT (OUTPATIENT)
Dept: INTERNAL MEDICINE CLINIC | Age: 63
End: 2023-05-10
Payer: COMMERCIAL

## 2023-05-10 VITALS
BODY MASS INDEX: 24.06 KG/M2 | HEART RATE: 72 BPM | DIASTOLIC BLOOD PRESSURE: 78 MMHG | SYSTOLIC BLOOD PRESSURE: 134 MMHG | WEIGHT: 153.6 LBS

## 2023-05-10 DIAGNOSIS — Z12.31 ENCOUNTER FOR SCREENING MAMMOGRAM FOR MALIGNANT NEOPLASM OF BREAST: ICD-10-CM

## 2023-05-10 DIAGNOSIS — M54.50 CHRONIC MIDLINE LOW BACK PAIN WITHOUT SCIATICA: ICD-10-CM

## 2023-05-10 DIAGNOSIS — G89.29 CHRONIC MIDLINE LOW BACK PAIN WITHOUT SCIATICA: ICD-10-CM

## 2023-05-10 DIAGNOSIS — E78.5 HYPERLIPIDEMIA, UNSPECIFIED HYPERLIPIDEMIA TYPE: ICD-10-CM

## 2023-05-10 DIAGNOSIS — I10 ESSENTIAL HYPERTENSION: Primary | ICD-10-CM

## 2023-05-10 DIAGNOSIS — R74.8 ELEVATED ALKALINE PHOSPHATASE LEVEL: ICD-10-CM

## 2023-05-10 PROCEDURE — 3075F SYST BP GE 130 - 139MM HG: CPT | Performed by: INTERNAL MEDICINE

## 2023-05-10 PROCEDURE — 99214 OFFICE O/P EST MOD 30 MIN: CPT | Performed by: INTERNAL MEDICINE

## 2023-05-10 PROCEDURE — 3017F COLORECTAL CA SCREEN DOC REV: CPT | Performed by: INTERNAL MEDICINE

## 2023-05-10 PROCEDURE — G8427 DOCREV CUR MEDS BY ELIG CLIN: HCPCS | Performed by: INTERNAL MEDICINE

## 2023-05-10 PROCEDURE — 1036F TOBACCO NON-USER: CPT | Performed by: INTERNAL MEDICINE

## 2023-05-10 PROCEDURE — 3078F DIAST BP <80 MM HG: CPT | Performed by: INTERNAL MEDICINE

## 2023-05-10 PROCEDURE — G8420 CALC BMI NORM PARAMETERS: HCPCS | Performed by: INTERNAL MEDICINE

## 2023-05-10 ASSESSMENT — ENCOUNTER SYMPTOMS
NAUSEA: 0
WHEEZING: 0
ABDOMINAL PAIN: 0
TROUBLE SWALLOWING: 0
PHOTOPHOBIA: 0
SHORTNESS OF BREATH: 0
VOICE CHANGE: 0
BACK PAIN: 1

## 2023-05-10 NOTE — PROGRESS NOTES
Luz Maria Sloan  1960  female  60 y.o. SUBJECTIVE:       Chief Complaint   Patient presents with    3 Month Follow-Up     Hip surgery 3/28 - recovered well, no complications. Hypertension    Back Pain     Chronic lower right back pain. Would like referral for chiropractor       HPI:  Follow-up visit for chronic problems. Significant improvement of left hip pain following surgery. History of chronic lower back pain and spasm. Unfortunately she continues to complain of symptoms.     Past Medical History:   Diagnosis Date    Alcohol problem drinking     Alcohol withdrawal delirium ([facility] [address] 2017    Anxiety     Facial bones, closed fracture ([facility] [address] 2017    Hypertension     Hyponatremia     Malignant hypertension 3/17/2016    Panic attacks      Past Surgical History:   Procedure Laterality Date    COLONOSCOPY  2018    next colonoscopy in , 10 years    COLONOSCOPY N/A 2018    COLONOSCOPY POLYPECTOMY SNARE/COLD BIOPSY performed by Yoly Hill MD at [address] [place] [address]      Right carotid artery surgery    TOTAL HIP ARTHROPLASTY Left 3/28/2023    LEFT TOTAL HIP REPLACEMENT-DIRECT ANTERIOR; Mary Lou Medina performed by Piedad York MD at [facility]    bilateral [facility]     Social History     Socioeconomic History    Marital status:      Spouse name: None    Number of children: 1    Years of education: None    Highest education level: None   Occupational History    Occupation: [employer]      Comment: 2017    Occupation: Previously a nurse's aide   Tobacco Use    Smoking status: Former     Packs/day: 0.50     Years: 38.00     Pack years: 19.00     Types: Cigarettes     Start date: 1979     Quit date: 10/18/2017     Years since quittin.5     Passive exposure: Past    Smokeless tobacco: Never   Vaping Use    Vaping Use: Never used   Substance and Sexual Activity

## 2023-05-15 DIAGNOSIS — E78.5 HYPERLIPIDEMIA, UNSPECIFIED HYPERLIPIDEMIA TYPE: ICD-10-CM

## 2023-05-15 DIAGNOSIS — R74.8 ELEVATED ALKALINE PHOSPHATASE LEVEL: ICD-10-CM

## 2023-05-15 LAB
ALBUMIN SERPL-MCNC: 4.9 G/DL (ref 3.4–5)
ALP SERPL-CCNC: 167 U/L (ref 40–129)
ALT SERPL-CCNC: 11 U/L (ref 10–40)
AST SERPL-CCNC: 24 U/L (ref 15–37)
BILIRUB DIRECT SERPL-MCNC: <0.2 MG/DL (ref 0–0.3)
BILIRUB INDIRECT SERPL-MCNC: ABNORMAL MG/DL (ref 0–1)
BILIRUB SERPL-MCNC: 0.5 MG/DL (ref 0–1)
CHOLEST SERPL-MCNC: 176 MG/DL (ref 0–199)
HDLC SERPL-MCNC: 85 MG/DL (ref 40–60)
LDL CHOLESTEROL CALCULATED: 76 MG/DL
PROT SERPL-MCNC: 7.4 G/DL (ref 6.4–8.2)
TRIGL SERPL-MCNC: 74 MG/DL (ref 0–150)
VLDLC SERPL CALC-MCNC: 15 MG/DL

## 2023-05-16 DIAGNOSIS — R94.8 ABNORMAL RADIONUCLIDE BONE SCAN: ICD-10-CM

## 2023-05-16 DIAGNOSIS — R74.8 ELEVATED ALKALINE PHOSPHATASE LEVEL: Primary | ICD-10-CM

## 2023-05-18 LAB
ALP BONE SERPL-CCNC: 40 U/L (ref 0–55)
ALP ISOS SERPL HS-CCNC: 0 U/L
ALP LIVER SERPL-CCNC: 125 U/L (ref 0–94)
ALP SERPL-CCNC: 165 U/L (ref 40–120)

## 2023-05-24 ENCOUNTER — HOSPITAL ENCOUNTER (OUTPATIENT)
Dept: GENERAL RADIOLOGY | Age: 63
Discharge: HOME | End: 2023-05-24
Payer: COMMERCIAL

## 2023-05-24 ENCOUNTER — HOSPITAL ENCOUNTER (OUTPATIENT)
Dept: WOMENS IMAGING | Age: 63
Discharge: HOME | End: 2023-05-24
Payer: COMMERCIAL

## 2023-05-24 ENCOUNTER — HOSPITAL ENCOUNTER (OUTPATIENT)
Age: 63
Discharge: HOME | End: 2023-05-24
Payer: COMMERCIAL

## 2023-05-24 DIAGNOSIS — Z12.31 ENCOUNTER FOR SCREENING MAMMOGRAM FOR MALIGNANT NEOPLASM OF BREAST: ICD-10-CM

## 2023-05-24 DIAGNOSIS — G89.29 CHRONIC MIDLINE LOW BACK PAIN WITHOUT SCIATICA: ICD-10-CM

## 2023-05-24 DIAGNOSIS — M54.50 CHRONIC MIDLINE LOW BACK PAIN WITHOUT SCIATICA: ICD-10-CM

## 2023-05-24 PROCEDURE — 72100 X-RAY EXAM L-S SPINE 2/3 VWS: CPT

## 2023-05-24 PROCEDURE — 77063 BREAST TOMOSYNTHESIS BI: CPT

## 2023-05-26 DIAGNOSIS — I10 ESSENTIAL HYPERTENSION: ICD-10-CM

## 2023-05-26 LAB
ALBUMIN SERPL-MCNC: 4.5 G/DL (ref 3.4–5)
ALP SERPL-CCNC: 160 U/L (ref 40–129)
ANION GAP SERPL CALCULATED.3IONS-SCNC: 9 MMOL/L (ref 3–16)
BUN SERPL-MCNC: 14 MG/DL (ref 7–20)
CALCIUM SERPL-MCNC: 9.1 MG/DL (ref 8.3–10.6)
CHLORIDE SERPL-SCNC: 95 MMOL/L (ref 99–110)
CO2 SERPL-SCNC: 27 MMOL/L (ref 21–32)
CREAT SERPL-MCNC: 0.6 MG/DL (ref 0.6–1.2)
DEPRECATED RDW RBC AUTO: 13.3 % (ref 12.4–15.4)
GFR SERPLBLD CREATININE-BSD FMLA CKD-EPI: >60 ML/MIN/{1.73_M2}
GLUCOSE SERPL-MCNC: 115 MG/DL (ref 70–99)
HCT VFR BLD AUTO: 35.2 % (ref 36–48)
HGB BLD-MCNC: 12 G/DL (ref 12–16)
MAGNESIUM SERPL-MCNC: 1.7 MG/DL (ref 1.8–2.4)
MCH RBC QN AUTO: 33.1 PG (ref 26–34)
MCHC RBC AUTO-ENTMCNC: 34.1 G/DL (ref 31–36)
MCV RBC AUTO: 97.1 FL (ref 80–100)
PHOSPHATE SERPL-MCNC: 3.7 MG/DL (ref 2.5–4.9)
PLATELET # BLD AUTO: 226 K/UL (ref 135–450)
PMV BLD AUTO: 8.6 FL (ref 5–10.5)
POTASSIUM SERPL-SCNC: 4.7 MMOL/L (ref 3.5–5.1)
RBC # BLD AUTO: 3.63 M/UL (ref 4–5.2)
SODIUM SERPL-SCNC: 131 MMOL/L (ref 136–145)
WBC # BLD AUTO: 5.1 K/UL (ref 4–11)

## 2023-06-05 DIAGNOSIS — R05.8 RECURRENT COUGH: ICD-10-CM

## 2023-06-05 DIAGNOSIS — J30.9 CHRONIC ALLERGIC RHINITIS: ICD-10-CM

## 2023-06-05 RX ORDER — MONTELUKAST SODIUM 10 MG/1
TABLET ORAL
Qty: 90 TABLET | Refills: 1 | Status: SHIPPED | OUTPATIENT
Start: 2023-06-05

## 2023-06-22 ENCOUNTER — HOSPITAL ENCOUNTER (OUTPATIENT)
Dept: CT IMAGING | Age: 63
Discharge: HOME | End: 2023-06-22
Attending: INTERNAL MEDICINE
Payer: COMMERCIAL

## 2023-06-22 DIAGNOSIS — R74.8 ELEVATED ALKALINE PHOSPHATASE LEVEL: ICD-10-CM

## 2023-06-22 DIAGNOSIS — C79.51 METASTASIS TO BONE (HCC): ICD-10-CM

## 2023-06-22 PROCEDURE — 6360000004 HC RX CONTRAST MEDICATION: Performed by: INTERNAL MEDICINE

## 2023-06-22 PROCEDURE — 74177 CT ABD & PELVIS W/CONTRAST: CPT

## 2023-06-22 RX ADMIN — IOHEXOL 50 ML: 240 INJECTION, SOLUTION INTRATHECAL; INTRAVASCULAR; INTRAVENOUS; ORAL at 17:25

## 2023-06-22 RX ADMIN — IOPAMIDOL 75 ML: 755 INJECTION, SOLUTION INTRAVENOUS at 17:25

## 2023-07-03 ENCOUNTER — TELEPHONE (OUTPATIENT)
Dept: PULMONOLOGY | Age: 63
End: 2023-07-03

## 2023-07-03 DIAGNOSIS — J30.9 CHRONIC ALLERGIC RHINITIS: ICD-10-CM

## 2023-07-03 DIAGNOSIS — F41.9 ANXIETY: ICD-10-CM

## 2023-07-03 RX ORDER — BUSPIRONE HYDROCHLORIDE 7.5 MG/1
7.5 TABLET ORAL DAILY PRN
Qty: 30 TABLET | Refills: 5 | Status: SHIPPED | OUTPATIENT
Start: 2023-07-03

## 2023-07-03 RX ORDER — FLUTICASONE PROPIONATE 50 MCG
SPRAY, SUSPENSION (ML) NASAL
Qty: 1 EACH | Refills: 5 | Status: SHIPPED | OUTPATIENT
Start: 2023-07-03

## 2023-07-03 NOTE — TELEPHONE ENCOUNTER
LVM for PT to call office back received referral from  for Lung Nodule.  7/3/23  LVM also with time and date we can get an apt scheduled [email or web address]

## 2023-07-10 RX ORDER — LANOLIN ALCOHOL/MO/W.PET/CERES
CREAM (GRAM) TOPICAL
Qty: 30 TABLET | Refills: 5 | Status: SHIPPED | OUTPATIENT
Start: 2023-07-10

## 2023-07-11 ENCOUNTER — OFFICE VISIT (OUTPATIENT)
Dept: VASCULAR SURGERY | Age: 63
End: 2023-07-11
Payer: COMMERCIAL

## 2023-07-11 ENCOUNTER — PROCEDURE VISIT (OUTPATIENT)
Dept: VASCULAR SURGERY | Age: 63
End: 2023-07-11
Payer: COMMERCIAL

## 2023-07-11 VITALS
DIASTOLIC BLOOD PRESSURE: 60 MMHG | SYSTOLIC BLOOD PRESSURE: 124 MMHG | BODY MASS INDEX: 24.17 KG/M2 | WEIGHT: 154 LBS | HEIGHT: 67 IN

## 2023-07-11 DIAGNOSIS — I65.23 CAROTID ATHEROSCLEROSIS, BILATERAL: ICD-10-CM

## 2023-07-11 DIAGNOSIS — I65.23 CAROTID ATHEROSCLEROSIS, BILATERAL: Primary | ICD-10-CM

## 2023-07-11 PROCEDURE — 1036F TOBACCO NON-USER: CPT | Performed by: SURGERY

## 2023-07-11 PROCEDURE — 99213 OFFICE O/P EST LOW 20 MIN: CPT | Performed by: SURGERY

## 2023-07-11 PROCEDURE — 3074F SYST BP LT 130 MM HG: CPT | Performed by: SURGERY

## 2023-07-11 PROCEDURE — G8420 CALC BMI NORM PARAMETERS: HCPCS | Performed by: SURGERY

## 2023-07-11 PROCEDURE — 3078F DIAST BP <80 MM HG: CPT | Performed by: SURGERY

## 2023-07-11 PROCEDURE — G8427 DOCREV CUR MEDS BY ELIG CLIN: HCPCS | Performed by: SURGERY

## 2023-07-11 PROCEDURE — 93880 EXTRACRANIAL BILAT STUDY: CPT | Performed by: SURGERY

## 2023-07-11 PROCEDURE — 3017F COLORECTAL CA SCREEN DOC REV: CPT | Performed by: SURGERY

## 2023-07-11 NOTE — PROGRESS NOTES
[facility])   Vascular Surgery Followup    Referring Provider:  Leah Perry MD     No chief complaint on file. History of Present Illness:  18-year-old female presents today for routine carotid duplex imaging. She is doing very well today and has no complaints. Denies TIA stroke or amaurosis. Past Medical History:   has a past medical history of Alcohol problem drinking, Alcohol withdrawal delirium ([address]), Anxiety, Facial bones, closed fracture ([address]), Hypertension, Hyponatremia, Malignant hypertension, and Panic attacks. Surgical History:   has a past surgical history that includes Neck surgery (2000); Tubal ligation; Wrist ganglion excision (1988); other surgical history; Colonoscopy (11/16/2018); Colonoscopy (N/A, 11/16/2018); and Total hip arthroplasty (Left, 3/28/2023). Social History:   reports that she quit smoking about 5 years ago. Her smoking use included cigarettes. She started smoking about 44 years ago. She has a 19.00 pack-year smoking history. She has been exposed to tobacco smoke. She has never used smokeless tobacco. She reports current drug use. Drug: Marijuana Arzella Skill). She reports that she does not drink alcohol. Family History:  family history includes Asthma in her father; Breast Cancer in her maternal aunt and maternal grandmother; Cancer in her father; Heart Disease in her father; Hypertension in her father; Kidney Disease in her mother; Migraines in her mother; Rheum Arthritis in her mother.      Home Medications:  Current Outpatient Medications   Medication Sig Dispense Refill    thiamine 100 MG tablet TAKE ONE TABLET BY MOUTH DAILY 30 tablet 5    busPIRone (BUSPAR) 7.5 MG tablet Take 1 tablet by mouth daily as needed (anxiety) 30 tablet 5    fluticasone (FLONASE) 50 MCG/ACT nasal spray SPRAY TWO SPRAYS IN EACH NOSTRIL ONCE DAILY 1 each 5    ascorbic acid (VITAMIN C) 500 MG tablet TAKE ONE TABLET BY MOUTH DAILY 90 tablet 1    montelukast (SINGULAIR) 10 MG tablet

## 2023-08-02 DIAGNOSIS — I65.23 BILATERAL CAROTID ARTERY STENOSIS: ICD-10-CM

## 2023-08-02 RX ORDER — ATORVASTATIN CALCIUM 20 MG/1
TABLET, FILM COATED ORAL
Qty: 90 TABLET | Refills: 1 | Status: SHIPPED | OUTPATIENT
Start: 2023-08-02

## 2023-08-03 DIAGNOSIS — I10 ESSENTIAL HYPERTENSION: ICD-10-CM

## 2023-08-03 LAB
ALBUMIN SERPL-MCNC: 4.8 G/DL (ref 3.4–5)
ANION GAP SERPL CALCULATED.3IONS-SCNC: 15 MMOL/L (ref 3–16)
BUN SERPL-MCNC: 14 MG/DL (ref 7–20)
CALCIUM SERPL-MCNC: 9.8 MG/DL (ref 8.3–10.6)
CHLORIDE SERPL-SCNC: 94 MMOL/L (ref 99–110)
CO2 SERPL-SCNC: 25 MMOL/L (ref 21–32)
CREAT SERPL-MCNC: 0.7 MG/DL (ref 0.6–1.2)
CREAT UR-MCNC: 35.4 MG/DL (ref 28–259)
DEPRECATED RDW RBC AUTO: 14.6 % (ref 12.4–15.4)
GFR SERPLBLD CREATININE-BSD FMLA CKD-EPI: >60 ML/MIN/{1.73_M2}
GLUCOSE SERPL-MCNC: 90 MG/DL (ref 70–99)
HCT VFR BLD AUTO: 39.4 % (ref 36–48)
HGB BLD-MCNC: 13.2 G/DL (ref 12–16)
MCH RBC QN AUTO: 32.5 PG (ref 26–34)
MCHC RBC AUTO-ENTMCNC: 33.6 G/DL (ref 31–36)
MCV RBC AUTO: 96.9 FL (ref 80–100)
PHOSPHATE SERPL-MCNC: 3.9 MG/DL (ref 2.5–4.9)
PLATELET # BLD AUTO: 212 K/UL (ref 135–450)
PMV BLD AUTO: 8.7 FL (ref 5–10.5)
POTASSIUM SERPL-SCNC: 4.9 MMOL/L (ref 3.5–5.1)
PROT UR-MCNC: <4 MG/DL
PROT/CREAT UR-RTO: NORMAL MG/DL
RBC # BLD AUTO: 4.06 M/UL (ref 4–5.2)
SODIUM SERPL-SCNC: 134 MMOL/L (ref 136–145)
WBC # BLD AUTO: 6.2 K/UL (ref 4–11)

## 2023-08-10 ENCOUNTER — OFFICE VISIT (OUTPATIENT)
Dept: INTERNAL MEDICINE CLINIC | Age: 63
End: 2023-08-10
Payer: COMMERCIAL

## 2023-08-10 VITALS
BODY MASS INDEX: 24.03 KG/M2 | OXYGEN SATURATION: 97 % | SYSTOLIC BLOOD PRESSURE: 112 MMHG | DIASTOLIC BLOOD PRESSURE: 60 MMHG | WEIGHT: 153.4 LBS | HEART RATE: 70 BPM

## 2023-08-10 DIAGNOSIS — F41.9 ANXIETY: ICD-10-CM

## 2023-08-10 DIAGNOSIS — I10 ESSENTIAL HYPERTENSION: ICD-10-CM

## 2023-08-10 DIAGNOSIS — R91.8 ABNORMAL CT SCAN, LUNG: Primary | ICD-10-CM

## 2023-08-10 DIAGNOSIS — J18.9 PNEUMONIA OF LEFT LOWER LOBE DUE TO INFECTIOUS ORGANISM: ICD-10-CM

## 2023-08-10 DIAGNOSIS — J43.9 EMPHYSEMA OF LEFT LUNG (HCC): ICD-10-CM

## 2023-08-10 PROCEDURE — G8427 DOCREV CUR MEDS BY ELIG CLIN: HCPCS | Performed by: INTERNAL MEDICINE

## 2023-08-10 PROCEDURE — 3078F DIAST BP <80 MM HG: CPT | Performed by: INTERNAL MEDICINE

## 2023-08-10 PROCEDURE — 3017F COLORECTAL CA SCREEN DOC REV: CPT | Performed by: INTERNAL MEDICINE

## 2023-08-10 PROCEDURE — G8420 CALC BMI NORM PARAMETERS: HCPCS | Performed by: INTERNAL MEDICINE

## 2023-08-10 PROCEDURE — 1036F TOBACCO NON-USER: CPT | Performed by: INTERNAL MEDICINE

## 2023-08-10 PROCEDURE — 99214 OFFICE O/P EST MOD 30 MIN: CPT | Performed by: INTERNAL MEDICINE

## 2023-08-10 PROCEDURE — 3023F SPIROM DOC REV: CPT | Performed by: INTERNAL MEDICINE

## 2023-08-10 PROCEDURE — 3074F SYST BP LT 130 MM HG: CPT | Performed by: INTERNAL MEDICINE

## 2023-08-10 ASSESSMENT — ENCOUNTER SYMPTOMS
VOICE CHANGE: 0
NAUSEA: 0
ABDOMINAL PAIN: 0
WHEEZING: 0
VOMITING: 0
TROUBLE SWALLOWING: 0
PHOTOPHOBIA: 0
SHORTNESS OF BREATH: 0

## 2023-08-10 NOTE — PROGRESS NOTES
Elayne Fire  1960  female  60 y.o. SUBJECTIVE:       Chief Complaint   Patient presents with    3 Month Follow-Up    Hypertension       HPI:  Follow up visit for chronic problems. History of abnormal CT scan of the chest with possible small consolidation and mucous plugging. Patient was referred to pulmonologist however did not make appointment.   She still get occasional cough however she recall having cough productive sputum and wheezing when she had the CT scan of the chest done in  ordered by oncologist.    Past Medical History:   Diagnosis Date    Alcohol problem drinking     Alcohol withdrawal delirium ([address]) 2017    Anxiety     Facial bones, closed fracture ([address]) 2017    Hypertension     Hyponatremia     Malignant hypertension 3/17/2016    Panic attacks      Past Surgical History:   Procedure Laterality Date    COLONOSCOPY  2018    next colonoscopy in , 10 years    COLONOSCOPY N/A 2018    COLONOSCOPY POLYPECTOMY SNARE/COLD BIOPSY performed by Radha Mims MD at [address] [facility]      Right carotid artery surgery    TOTAL HIP ARTHROPLASTY Left 3/28/2023    LEFT TOTAL HIP REPLACEMENT-DIRECT ANTERIOR; Leandrew Sinning performed by Donna Wilkerson MD at [facility]    bilateral wrists, [address]     Social History     Socioeconomic History    Marital status:      Spouse name: None    Number of children: 1    Years of education: None    Highest education level: None   Occupational History    Occupation: [employer]      Comment: 2017    Occupation: Previously a nurse's aide   Tobacco Use    Smoking status: Former     Packs/day: 0.50     Years: 38.00     Pack years: 19.00     Types: Cigarettes     Start date: 1979     Quit date: 10/18/2017     Years since quittin.8     Passive exposure: Past    Smokeless tobacco: Never   Vaping Use    Vaping Use: Never

## 2023-08-18 DIAGNOSIS — I49.9 IRREGULAR HEART BEAT: ICD-10-CM

## 2023-08-18 DIAGNOSIS — M62.838 NECK MUSCLE SPASM: ICD-10-CM

## 2023-08-18 DIAGNOSIS — M62.830 BACK SPASM: ICD-10-CM

## 2023-08-18 DIAGNOSIS — I10 ESSENTIAL HYPERTENSION: ICD-10-CM

## 2023-08-18 RX ORDER — TIZANIDINE 2 MG/1
TABLET ORAL
Qty: 90 TABLET | Refills: 1 | Status: SHIPPED | OUTPATIENT
Start: 2023-08-18

## 2023-08-18 RX ORDER — METOPROLOL SUCCINATE 50 MG/1
TABLET, EXTENDED RELEASE ORAL
Qty: 90 TABLET | Refills: 1 | Status: SHIPPED | OUTPATIENT
Start: 2023-08-18

## 2023-09-10 ENCOUNTER — HOSPITAL ENCOUNTER (OUTPATIENT)
Dept: CT IMAGING | Age: 63
Discharge: HOME | End: 2023-09-10
Attending: INTERNAL MEDICINE
Payer: COMMERCIAL

## 2023-09-10 DIAGNOSIS — J18.9 PNEUMONIA OF LEFT LOWER LOBE DUE TO INFECTIOUS ORGANISM: ICD-10-CM

## 2023-09-10 DIAGNOSIS — J43.9 EMPHYSEMA OF LEFT LUNG (HCC): ICD-10-CM

## 2023-09-10 DIAGNOSIS — R91.8 ABNORMAL CT SCAN, LUNG: ICD-10-CM

## 2023-09-10 PROCEDURE — 71250 CT THORAX DX C-: CPT

## 2023-09-19 ENCOUNTER — OFFICE VISIT (OUTPATIENT)
Dept: PULMONOLOGY | Age: 63
End: 2023-09-19
Payer: COMMERCIAL

## 2023-09-19 VITALS
DIASTOLIC BLOOD PRESSURE: 72 MMHG | BODY MASS INDEX: 24.33 KG/M2 | HEART RATE: 57 BPM | OXYGEN SATURATION: 98 % | HEIGHT: 67 IN | SYSTOLIC BLOOD PRESSURE: 118 MMHG | WEIGHT: 155 LBS

## 2023-09-19 DIAGNOSIS — R91.1 PULMONARY NODULE: ICD-10-CM

## 2023-09-19 DIAGNOSIS — Z87.891 FORMER SMOKER: ICD-10-CM

## 2023-09-19 DIAGNOSIS — J44.9 COPD, MODERATE (HCC): ICD-10-CM

## 2023-09-19 DIAGNOSIS — K21.9 GASTROESOPHAGEAL REFLUX DISEASE, UNSPECIFIED WHETHER ESOPHAGITIS PRESENT: ICD-10-CM

## 2023-09-19 DIAGNOSIS — R04.2 HEMOPTYSIS: ICD-10-CM

## 2023-09-19 DIAGNOSIS — J43.2 CENTRILOBULAR EMPHYSEMA (HCC): Primary | ICD-10-CM

## 2023-09-19 PROCEDURE — 3023F SPIROM DOC REV: CPT | Performed by: INTERNAL MEDICINE

## 2023-09-19 PROCEDURE — G8427 DOCREV CUR MEDS BY ELIG CLIN: HCPCS | Performed by: INTERNAL MEDICINE

## 2023-09-19 PROCEDURE — 99204 OFFICE O/P NEW MOD 45 MIN: CPT | Performed by: INTERNAL MEDICINE

## 2023-09-19 PROCEDURE — 3078F DIAST BP <80 MM HG: CPT | Performed by: INTERNAL MEDICINE

## 2023-09-19 PROCEDURE — 1036F TOBACCO NON-USER: CPT | Performed by: INTERNAL MEDICINE

## 2023-09-19 PROCEDURE — G8420 CALC BMI NORM PARAMETERS: HCPCS | Performed by: INTERNAL MEDICINE

## 2023-09-19 PROCEDURE — 3074F SYST BP LT 130 MM HG: CPT | Performed by: INTERNAL MEDICINE

## 2023-09-19 PROCEDURE — 3017F COLORECTAL CA SCREEN DOC REV: CPT | Performed by: INTERNAL MEDICINE

## 2023-09-19 RX ORDER — IPRATROPIUM BROMIDE AND ALBUTEROL SULFATE 2.5; .5 MG/3ML; MG/3ML
1 SOLUTION RESPIRATORY (INHALATION) EVERY 4 HOURS
Qty: 360 ML | Refills: 5 | Status: SHIPPED | OUTPATIENT
Start: 2023-09-19

## 2023-09-19 RX ORDER — DOXYCYCLINE HYCLATE 100 MG/1
100 CAPSULE ORAL DAILY
Qty: 30 CAPSULE | Refills: 0 | Status: SHIPPED | OUTPATIENT
Start: 2023-09-19 | End: 2023-10-19

## 2023-09-19 ASSESSMENT — ENCOUNTER SYMPTOMS
DIARRHEA: 0
CONSTIPATION: 0
ABDOMINAL PAIN: 0
NAUSEA: 0
SINUS PRESSURE: 0
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
## 2023-09-20 DIAGNOSIS — F41.9 ANXIETY: ICD-10-CM

## 2023-09-20 RX ORDER — ESCITALOPRAM OXALATE 20 MG/1
TABLET ORAL
Qty: 90 TABLET | Refills: 1 | Status: SHIPPED | OUTPATIENT
Start: 2023-09-20

## 2023-09-27 RX ORDER — FOLIC ACID 1 MG/1
TABLET ORAL
Qty: 90 TABLET | Refills: 1 | Status: SHIPPED | OUTPATIENT
Start: 2023-09-27

## 2023-10-16 ENCOUNTER — OFFICE VISIT (OUTPATIENT)
Dept: ORTHOPEDIC SURGERY | Age: 63
End: 2023-10-16

## 2023-10-16 VITALS — HEIGHT: 67 IN | WEIGHT: 155 LBS | BODY MASS INDEX: 24.33 KG/M2

## 2023-10-16 DIAGNOSIS — Z96.642 S/P TOTAL LEFT HIP ARTHROPLASTY: Primary | ICD-10-CM

## 2023-10-16 RX ORDER — AMOXICILLIN 500 MG/1
2000 CAPSULE ORAL ONCE AS NEEDED
Qty: 8 CAPSULE | Refills: 0 | Status: SHIPPED | OUTPATIENT
Start: 2023-10-16

## 2023-10-16 NOTE — PROGRESS NOTES
Patient: Giovanni Jordan                  : 1960   MRN: [id number]   Date of Visit: 10/16/23     Physician: Bakari Singleton MD.     Reason for Visit: Status post LUIS     Subjective History of Present Illness:     Giovanni Jordan is here for regularly scheduled follow-up s/p LEFT LUIS. Reports they are doing well, occasional aches and pains are controlled with over the counter medications. They do not report fevers, chills or drainage from the incision site    Physical Examination??: ?   General: Patient is alert and oriented x 3 and appears comfortable. Incision is well-approximated, no erythema, fluctuance   Fires quad, SILT to thigh     Radiographs: AP pelvis/AP/lateral hip views of operative hip with well-positioned total hip arthroplasty. No evidence of fracture subluxation or dislocation. Assessment and Plan?: The patient is progressing well approximately 6 mo s/p LUIS     1. A thorough discussion was had with the patient concerning the ?postoperative course and the patient is in agreement with the plan. 2. They will continue to wean from pain medications and progress weight bearing    3. Discussed the need to avoid invasive procedures within 3 months of the operative procedure and the need for antibiotics prior to dental procedures at least for 1 years after arthroplasty  -Provided her a prescription for amoxicillin  4. Will see the patient in 6months with repeat xrays at that time.     _______________________      Bakari Singleton MD

## 2023-10-23 DIAGNOSIS — B35.3 TINEA PEDIS OF BOTH FEET: ICD-10-CM

## 2023-10-23 RX ORDER — CLOTRIMAZOLE 1 %
CREAM (GRAM) TOPICAL
Qty: 60 G | Refills: 0 | Status: SHIPPED | OUTPATIENT
Start: 2023-10-23

## 2023-10-23 NOTE — TELEPHONE ENCOUNTER
Last OV: 8/10/2023  Next OV: 11/10/2023    Next appointment due:11/10/23    Last fill:8/1/22  Refills:1

## 2023-11-08 DIAGNOSIS — I73.00 RAYNAUD'S PHENOMENON WITHOUT GANGRENE: ICD-10-CM

## 2023-11-08 DIAGNOSIS — I10 ESSENTIAL HYPERTENSION: ICD-10-CM

## 2023-11-08 RX ORDER — NIFEDIPINE 30 MG/1
30 TABLET, EXTENDED RELEASE ORAL
Qty: 90 TABLET | Refills: 1 | Status: SHIPPED | OUTPATIENT
Start: 2023-11-08

## 2023-11-09 DIAGNOSIS — E87.1 HYPONATREMIA: ICD-10-CM

## 2023-11-09 LAB
ALBUMIN SERPL-MCNC: 4.9 G/DL (ref 3.4–5)
ANION GAP SERPL CALCULATED.3IONS-SCNC: 13 MMOL/L (ref 3–16)
BUN SERPL-MCNC: 9 MG/DL (ref 7–20)
CALCIUM SERPL-MCNC: 9.5 MG/DL (ref 8.3–10.6)
CHLORIDE SERPL-SCNC: 92 MMOL/L (ref 99–110)
CO2 SERPL-SCNC: 26 MMOL/L (ref 21–32)
CREAT SERPL-MCNC: 0.6 MG/DL (ref 0.6–1.2)
DEPRECATED RDW RBC AUTO: 13.5 % (ref 12.4–15.4)
GFR SERPLBLD CREATININE-BSD FMLA CKD-EPI: >60 ML/MIN/{1.73_M2}
GLUCOSE SERPL-MCNC: 99 MG/DL (ref 70–99)
HCT VFR BLD AUTO: 40.9 % (ref 36–48)
HGB BLD-MCNC: 13.9 G/DL (ref 12–16)
MCH RBC QN AUTO: 33.9 PG (ref 26–34)
MCHC RBC AUTO-ENTMCNC: 34 G/DL (ref 31–36)
MCV RBC AUTO: 99.6 FL (ref 80–100)
PHOSPHATE SERPL-MCNC: 3.8 MG/DL (ref 2.5–4.9)
PLATELET # BLD AUTO: 201 K/UL (ref 135–450)
PMV BLD AUTO: 8.7 FL (ref 5–10.5)
POTASSIUM SERPL-SCNC: 4.8 MMOL/L (ref 3.5–5.1)
RBC # BLD AUTO: 4.11 M/UL (ref 4–5.2)
SODIUM SERPL-SCNC: 131 MMOL/L (ref 136–145)
WBC # BLD AUTO: 6.3 K/UL (ref 4–11)

## 2023-11-10 ENCOUNTER — OFFICE VISIT (OUTPATIENT)
Dept: INTERNAL MEDICINE CLINIC | Age: 63
End: 2023-11-10

## 2023-11-10 VITALS
HEART RATE: 68 BPM | BODY MASS INDEX: 24.25 KG/M2 | DIASTOLIC BLOOD PRESSURE: 80 MMHG | WEIGHT: 154.8 LBS | OXYGEN SATURATION: 99 % | SYSTOLIC BLOOD PRESSURE: 132 MMHG

## 2023-11-10 DIAGNOSIS — Z23 NEED FOR INFLUENZA VACCINATION: ICD-10-CM

## 2023-11-10 DIAGNOSIS — K76.0 FATTY LIVER: ICD-10-CM

## 2023-11-10 DIAGNOSIS — F41.9 ANXIETY: ICD-10-CM

## 2023-11-10 DIAGNOSIS — K21.9 GASTROESOPHAGEAL REFLUX DISEASE WITHOUT ESOPHAGITIS: ICD-10-CM

## 2023-11-10 DIAGNOSIS — R74.8 ELEVATED ALKALINE PHOSPHATASE LEVEL: ICD-10-CM

## 2023-11-10 DIAGNOSIS — I10 ESSENTIAL HYPERTENSION: Primary | ICD-10-CM

## 2023-11-10 DIAGNOSIS — M62.830 BACK SPASM: ICD-10-CM

## 2023-11-10 RX ORDER — OMEPRAZOLE 40 MG/1
40 CAPSULE, DELAYED RELEASE ORAL
Qty: 90 CAPSULE | Refills: 1 | Status: SHIPPED | OUTPATIENT
Start: 2023-11-10

## 2023-11-10 ASSESSMENT — ENCOUNTER SYMPTOMS
ABDOMINAL PAIN: 0
PHOTOPHOBIA: 0
VOICE CHANGE: 0
VOMITING: 0
WHEEZING: 0
SHORTNESS OF BREATH: 0
TROUBLE SWALLOWING: 0
NAUSEA: 0

## 2023-11-10 NOTE — PROGRESS NOTES
[facility]  1960  female  60 y.o. SUBJECTIVE:       Chief Complaint   Patient presents with    3 Month Follow-Up    Hypertension    OTHER     Nebulizer not working - will be reaching out to pulmonology for replacement - was helping when using Duoneb    Flu Vaccine       HPI:  Follow-up visit for chronic problems. Patient has been evaluated by nephrologist for chronic hyponatremia. After reducing sodium intake to 1 tablet/day her recent sodium level again appears to be low. Her sodium level is 131 as of yesterday. Patient denies fatigue tiredness weakness. SHe has follow-up appointment with nephrologist within a week. History of slightly elevated alkaline phosphatase with liver fraction. Patient denies any upper quadrant abdominal pain nausea vomiting. History of fatty liver.         Past Medical History:   Diagnosis Date    Alcohol problem drinking     Alcohol withdrawal delirium ([address]) 2/22/2017    Anxiety     Facial bones, closed fracture ([address]) 2/22/2017    Hypertension     Hyponatremia     Malignant hypertension 3/17/2016    Panic attacks      Past Surgical History:   Procedure Laterality Date    COLONOSCOPY  11/16/2018    next colonoscopy in 2028, 10 years    COLONOSCOPY N/A 11/16/2018    COLONOSCOPY POLYPECTOMY SNARE/COLD BIOPSY performed by Rodrigo So MD at [facility]      Right carotid artery surgery    TOTAL HIP ARTHROPLASTY Left 3/28/2023    LEFT TOTAL HIP REPLACEMENT-DIRECT ANTERIOR; No Enoch performed by Norbert Biggs MD at [facility]    bilateral wrists, [address]     Social History     Socioeconomic History    Marital status:     Number of children: 1   Occupational History    Occupation: [employer]      Comment: 5/2017    Occupation: Previously a nurse's aide   Tobacco Use    Smoking status: Former     Packs/day: 0.50     Years: 38.00     Additional

## 2023-11-12 ENCOUNTER — HOSPITAL ENCOUNTER (OUTPATIENT)
Dept: CT IMAGING | Age: 63
Discharge: HOME | End: 2023-11-12
Attending: INTERNAL MEDICINE
Payer: COMMERCIAL

## 2023-11-12 DIAGNOSIS — R04.2 HEMOPTYSIS: ICD-10-CM

## 2023-11-12 DIAGNOSIS — R91.1 PULMONARY NODULE: ICD-10-CM

## 2023-11-12 PROCEDURE — 71250 CT THORAX DX C-: CPT

## 2023-11-14 ENCOUNTER — OFFICE VISIT (OUTPATIENT)
Dept: PULMONOLOGY | Age: 63
End: 2023-11-14
Payer: COMMERCIAL

## 2023-11-14 VITALS
HEART RATE: 62 BPM | BODY MASS INDEX: 24.01 KG/M2 | WEIGHT: 153 LBS | DIASTOLIC BLOOD PRESSURE: 72 MMHG | OXYGEN SATURATION: 98 % | SYSTOLIC BLOOD PRESSURE: 130 MMHG | HEIGHT: 67 IN

## 2023-11-14 DIAGNOSIS — J43.9 PULMONARY EMPHYSEMA, UNSPECIFIED EMPHYSEMA TYPE (HCC): ICD-10-CM

## 2023-11-14 DIAGNOSIS — J44.9 COPD, MODERATE (HCC): Primary | ICD-10-CM

## 2023-11-14 DIAGNOSIS — Z87.891 FORMER SMOKER: ICD-10-CM

## 2023-11-14 DIAGNOSIS — R04.2 HEMOPTYSIS: ICD-10-CM

## 2023-11-14 DIAGNOSIS — R91.1 PULMONARY NODULE: ICD-10-CM

## 2023-11-14 PROCEDURE — 3017F COLORECTAL CA SCREEN DOC REV: CPT | Performed by: INTERNAL MEDICINE

## 2023-11-14 PROCEDURE — 3078F DIAST BP <80 MM HG: CPT | Performed by: INTERNAL MEDICINE

## 2023-11-14 PROCEDURE — 3075F SYST BP GE 130 - 139MM HG: CPT | Performed by: INTERNAL MEDICINE

## 2023-11-14 PROCEDURE — 3023F SPIROM DOC REV: CPT | Performed by: INTERNAL MEDICINE

## 2023-11-14 PROCEDURE — G8482 FLU IMMUNIZE ORDER/ADMIN: HCPCS | Performed by: INTERNAL MEDICINE

## 2023-11-14 PROCEDURE — G8427 DOCREV CUR MEDS BY ELIG CLIN: HCPCS | Performed by: INTERNAL MEDICINE

## 2023-11-14 PROCEDURE — 1036F TOBACCO NON-USER: CPT | Performed by: INTERNAL MEDICINE

## 2023-11-14 PROCEDURE — 99214 OFFICE O/P EST MOD 30 MIN: CPT | Performed by: INTERNAL MEDICINE

## 2023-11-14 PROCEDURE — G8420 CALC BMI NORM PARAMETERS: HCPCS | Performed by: INTERNAL MEDICINE

## 2023-11-14 ASSESSMENT — ENCOUNTER SYMPTOMS
ABDOMINAL PAIN: 0
NAUSEA: 0
SINUS PRESSURE: 0
DIARRHEA: 0
CONSTIPATION: 0

## 2023-12-05 DIAGNOSIS — R05.8 RECURRENT COUGH: ICD-10-CM

## 2023-12-05 DIAGNOSIS — J30.9 CHRONIC ALLERGIC RHINITIS: ICD-10-CM

## 2023-12-05 RX ORDER — MONTELUKAST SODIUM 10 MG/1
TABLET ORAL
Qty: 90 TABLET | Refills: 1 | Status: SHIPPED | OUTPATIENT
Start: 2023-12-05

## 2023-12-11 ENCOUNTER — TELEPHONE (OUTPATIENT)
Dept: PULMONOLOGY | Age: 63
End: 2023-12-11

## 2023-12-11 RX ORDER — DOXYCYCLINE HYCLATE 100 MG/1
100 CAPSULE ORAL 2 TIMES DAILY
Qty: 20 CAPSULE | Refills: 0 | Status: SHIPPED | OUTPATIENT
Start: 2023-12-11 | End: 2023-12-21

## 2023-12-11 RX ORDER — PREDNISONE 10 MG/1
TABLET ORAL
Qty: 30 TABLET | Refills: 0 | Status: SHIPPED | OUTPATIENT
Start: 2023-12-11 | End: 2023-12-21

## 2023-12-11 NOTE — TELEPHONE ENCOUNTER
Pt calling complaining of the following symptoms:      Fever- No    Sore throat- has a sore throat    Cough- yes ( phlegm yellowish white)      Fatigue- Yes     Body Aches- Yes     Sinus Drainage- Yes          Color- Yellowish White      Headache- Yes     Duration of symptoms- Mid last week Wednesday/Thursday      Tried anything OTC first - Yes  (what tried)- Mucinex, Motrin and Tylenol     Patient has covid test at home said she will take that and call office back with results     Pt asking if ABX can be called into their pharmacy     Name of Pharmacy-/ verified in [address] [place] [address] [id number] Dre Kerr, [address] [facility] - F [phone]     [address] [address]: [phone]

## 2023-12-29 DIAGNOSIS — T14.8XXA BRUISE: ICD-10-CM

## 2023-12-29 RX ORDER — ASCORBIC ACID 500 MG
500 TABLET ORAL DAILY
Qty: 90 TABLET | Refills: 1 | Status: SHIPPED | OUTPATIENT
Start: 2023-12-29

## 2024-01-07 DIAGNOSIS — F41.9 ANXIETY: ICD-10-CM

## 2024-01-08 RX ORDER — BUSPIRONE HYDROCHLORIDE 7.5 MG/1
7.5 TABLET ORAL DAILY PRN
Qty: 30 TABLET | Refills: 3 | Status: SHIPPED | OUTPATIENT
Start: 2024-01-08

## 2024-01-11 ENCOUNTER — PROCEDURE VISIT (OUTPATIENT)
Dept: VASCULAR SURGERY | Age: 64
End: 2024-01-11
Payer: COMMERCIAL

## 2024-01-11 DIAGNOSIS — I65.23 CAROTID ATHEROSCLEROSIS, BILATERAL: ICD-10-CM

## 2024-01-11 PROCEDURE — 93880 EXTRACRANIAL BILAT STUDY: CPT | Performed by: SURGERY

## 2024-01-11 NOTE — PROGRESS NOTES
Upon arrival, while bringing patient back for exam, she became very dizzy. She was assisted to a chair in the hallway. She recovered for a few moments, then was transferred to a wheel chair and then into the exam room. Blood pressure was obtained and was 90/50. Pulse was 88 bpm with frequent ectopy. Patient stated that was low for her, stated her normal blood pressure is normally around 130/80. At this point she stated she felt back to normal. She stated nothing like this had ever happened before. She had her normal breakfast minus an egg. She took her blood pressure medication 2 hours earlier than normal and she also had the RSV vaccine yesterday.   Carotid US was completed with bilateral ICA stenosis of 50-69%. This demonstrated no change from previous study of 7/11/23.  After carotid US was completed, I took her blood pressure in the supine position. It was 120/70. 112/70 in the seated position and 100/60 in the standing position. During this process patient advised she felt completely normal. Patient was escorted to the front door of the facility with a wheelchair, to observe for any possible incident.Upon departure, patient stated she still felt normal. Advised patient to follow up with her PCP and/or electrophysiologist.    Statement Selected

## 2024-01-15 RX ORDER — ALBUTEROL SULFATE 90 UG/1
AEROSOL, METERED RESPIRATORY (INHALATION)
Qty: 18 G | Refills: 4 | Status: SHIPPED | OUTPATIENT
Start: 2024-01-15

## 2024-01-17 ENCOUNTER — TELEPHONE (OUTPATIENT)
Dept: VASCULAR SURGERY | Age: 64
End: 2024-01-17

## 2024-01-17 DIAGNOSIS — I65.23 CAROTID ATHEROSCLEROSIS, BILATERAL: Primary | ICD-10-CM

## 2024-01-17 NOTE — TELEPHONE ENCOUNTER
Unable to leave message as mailbox full.  Results of carotid duplex are stable.  Plan to repeat in 6 months.

## 2024-01-23 DIAGNOSIS — J30.9 CHRONIC ALLERGIC RHINITIS: ICD-10-CM

## 2024-01-23 RX ORDER — FLUTICASONE PROPIONATE 50 MCG
SPRAY, SUSPENSION (ML) NASAL
Qty: 16 G | Refills: 2 | Status: SHIPPED | OUTPATIENT
Start: 2024-01-23

## 2024-01-31 RX ORDER — LANOLIN ALCOHOL/MO/W.PET/CERES
100 CREAM (GRAM) TOPICAL DAILY
Qty: 90 TABLET | Refills: 1 | Status: SHIPPED | OUTPATIENT
Start: 2024-01-31

## 2024-02-02 DIAGNOSIS — M54.50 ACUTE BILATERAL LOW BACK PAIN WITHOUT SCIATICA: ICD-10-CM

## 2024-02-02 RX ORDER — NAPROXEN 500 MG/1
TABLET ORAL
Qty: 60 TABLET | Refills: 5 | OUTPATIENT
Start: 2024-02-02

## 2024-02-07 RX ORDER — NAPROXEN 500 MG/1
500 TABLET ORAL DAILY
Qty: 60 TABLET | Refills: 0 | Status: SHIPPED | OUTPATIENT
Start: 2024-02-07

## 2024-02-07 RX ORDER — NAPROXEN 500 MG/1
500 TABLET ORAL 2 TIMES DAILY WITH MEALS
COMMUNITY
End: 2024-02-07 | Stop reason: SDUPTHER

## 2024-02-07 NOTE — TELEPHONE ENCOUNTER
Dr castro did not prescribe that medication so she will need to request it from the original provider thank you

## 2024-02-07 NOTE — TELEPHONE ENCOUNTER
----- Message from Sheree Singh sent at 2024 10:10 AM EST -----  Subject: Refill Request    QUESTIONS  Name of Medication? Other - naproxen  Patient-reported dosage and instructions? once a day  How many days do you have left? 0  Preferred Pharmacy? [facility] [id number]  Pharmacy phone number (if available)? [phone]  Additional Information for Provider? patient states her prescription is   .   ---------------------------------------------------------------------------  --------------  CALL BACK INFO  What is the best way for the office to contact you? OK to leave message on   voicemail  Preferred Call Back Phone Number? [phone]  ---------------------------------------------------------------------------  --------------  SCRIPT ANSWERS  Relationship to Patient? Self

## 2024-02-07 NOTE — TELEPHONE ENCOUNTER
----- Message from Sheree Singh sent at 2024 10:10 AM EST -----  Subject: Refill Request    QUESTIONS  Name of Medication? Other - naproxen  Patient-reported dosage and instructions? once a day  How many days do you have left? 0  Preferred Pharmacy? [facility] [id number]  Pharmacy phone number (if available)? [phone]  Additional Information for Provider? patient states her prescription is   .   ---------------------------------------------------------------------------  --------------  CALL BACK INFO  What is the best way for the office to contact you? OK to leave message on   voicemail  Preferred Call Back Phone Number? [phone]  ---------------------------------------------------------------------------  --------------  SCRIPT ANSWERS  Relationship to Patient? Self

## 2024-02-08 DIAGNOSIS — E87.1 HYPONATREMIA: ICD-10-CM

## 2024-02-08 DIAGNOSIS — I65.23 BILATERAL CAROTID ARTERY STENOSIS: ICD-10-CM

## 2024-02-08 DIAGNOSIS — I10 ESSENTIAL HYPERTENSION: ICD-10-CM

## 2024-02-08 DIAGNOSIS — K76.0 FATTY LIVER: ICD-10-CM

## 2024-02-08 LAB
ALBUMIN SERPL-MCNC: 5.2 G/DL (ref 3.4–5)
ANION GAP SERPL CALCULATED.3IONS-SCNC: 13 MMOL/L (ref 3–16)
BILIRUB UR QL STRIP.AUTO: NEGATIVE
BUN SERPL-MCNC: 12 MG/DL (ref 7–20)
CALCIUM SERPL-MCNC: 9.1 MG/DL (ref 8.3–10.6)
CHLORIDE SERPL-SCNC: 92 MMOL/L (ref 99–110)
CLARITY UR: CLEAR
CO2 SERPL-SCNC: 26 MMOL/L (ref 21–32)
COLOR UR: YELLOW
CREAT SERPL-MCNC: 0.6 MG/DL (ref 0.6–1.2)
CREAT UR-MCNC: 11.3 MG/DL (ref 28–259)
DEPRECATED RDW RBC AUTO: 13.2 % (ref 12.4–15.4)
GFR SERPLBLD CREATININE-BSD FMLA CKD-EPI: >60 ML/MIN/{1.73_M2}
GLUCOSE SERPL-MCNC: 117 MG/DL (ref 70–99)
GLUCOSE UR STRIP.AUTO-MCNC: NEGATIVE MG/DL
HCT VFR BLD AUTO: 40.1 % (ref 36–48)
HGB BLD-MCNC: 13.8 G/DL (ref 12–16)
HGB UR QL STRIP.AUTO: NEGATIVE
KETONES UR STRIP.AUTO-MCNC: NEGATIVE MG/DL
LEUKOCYTE ESTERASE UR QL STRIP.AUTO: NEGATIVE
MCH RBC QN AUTO: 33.5 PG (ref 26–34)
MCHC RBC AUTO-ENTMCNC: 34.4 G/DL (ref 31–36)
MCV RBC AUTO: 97.2 FL (ref 80–100)
NITRITE UR QL STRIP.AUTO: NEGATIVE
PH UR STRIP.AUTO: 6.5 [PH] (ref 5–8)
PHOSPHATE SERPL-MCNC: 3.8 MG/DL (ref 2.5–4.9)
PLATELET # BLD AUTO: 227 K/UL (ref 135–450)
PMV BLD AUTO: 8.7 FL (ref 5–10.5)
POTASSIUM SERPL-SCNC: 4.1 MMOL/L (ref 3.5–5.1)
PROT UR STRIP.AUTO-MCNC: NEGATIVE MG/DL
PROT UR-MCNC: <4 MG/DL
PROT/CREAT UR-RTO: ABNORMAL MG/DL
RBC # BLD AUTO: 4.12 M/UL (ref 4–5.2)
SODIUM SERPL-SCNC: 131 MMOL/L (ref 136–145)
SP GR UR STRIP.AUTO: 1 (ref 1–1.03)
UA DIPSTICK W REFLEX MICRO PNL UR: NORMAL
URN SPEC COLLECT METH UR: NORMAL
UROBILINOGEN UR STRIP-ACNC: 0.2 E.U./DL
WBC # BLD AUTO: 6.5 K/UL (ref 4–11)

## 2024-02-09 RX ORDER — ATORVASTATIN CALCIUM 20 MG/1
20 TABLET, FILM COATED ORAL DAILY
Qty: 90 TABLET | Refills: 1 | Status: SHIPPED | OUTPATIENT
Start: 2024-02-09

## 2024-02-15 ENCOUNTER — OFFICE VISIT (OUTPATIENT)
Dept: INTERNAL MEDICINE CLINIC | Age: 64
End: 2024-02-15
Payer: COMMERCIAL

## 2024-02-15 VITALS
DIASTOLIC BLOOD PRESSURE: 67 MMHG | OXYGEN SATURATION: 99 % | SYSTOLIC BLOOD PRESSURE: 124 MMHG | BODY MASS INDEX: 24.43 KG/M2 | HEART RATE: 67 BPM | WEIGHT: 156 LBS

## 2024-02-15 DIAGNOSIS — I10 ESSENTIAL HYPERTENSION: Primary | ICD-10-CM

## 2024-02-15 DIAGNOSIS — I47.29 NSVT (NONSUSTAINED VENTRICULAR TACHYCARDIA) (HCC): ICD-10-CM

## 2024-02-15 DIAGNOSIS — M62.838 NECK MUSCLE SPASM: ICD-10-CM

## 2024-02-15 DIAGNOSIS — Z23 NEED FOR TDAP VACCINATION: ICD-10-CM

## 2024-02-15 DIAGNOSIS — E78.5 HYPERLIPIDEMIA, UNSPECIFIED HYPERLIPIDEMIA TYPE: ICD-10-CM

## 2024-02-15 DIAGNOSIS — J44.9 COPD, MODERATE (HCC): ICD-10-CM

## 2024-02-15 DIAGNOSIS — F41.9 ANXIETY: ICD-10-CM

## 2024-02-15 DIAGNOSIS — I49.9 IRREGULAR HEART BEAT: ICD-10-CM

## 2024-02-15 DIAGNOSIS — M62.830 BACK SPASM: ICD-10-CM

## 2024-02-15 DIAGNOSIS — J31.0 CHRONIC RHINITIS: ICD-10-CM

## 2024-02-15 DIAGNOSIS — I10 ESSENTIAL HYPERTENSION: ICD-10-CM

## 2024-02-15 DIAGNOSIS — I49.3 VENTRICULAR ECTOPIC BEATS: ICD-10-CM

## 2024-02-15 PROCEDURE — 90715 TDAP VACCINE 7 YRS/> IM: CPT | Performed by: INTERNAL MEDICINE

## 2024-02-15 PROCEDURE — 1036F TOBACCO NON-USER: CPT | Performed by: INTERNAL MEDICINE

## 2024-02-15 PROCEDURE — 90471 IMMUNIZATION ADMIN: CPT | Performed by: INTERNAL MEDICINE

## 2024-02-15 PROCEDURE — G8427 DOCREV CUR MEDS BY ELIG CLIN: HCPCS | Performed by: INTERNAL MEDICINE

## 2024-02-15 PROCEDURE — G8420 CALC BMI NORM PARAMETERS: HCPCS | Performed by: INTERNAL MEDICINE

## 2024-02-15 PROCEDURE — G8482 FLU IMMUNIZE ORDER/ADMIN: HCPCS | Performed by: INTERNAL MEDICINE

## 2024-02-15 PROCEDURE — 3023F SPIROM DOC REV: CPT | Performed by: INTERNAL MEDICINE

## 2024-02-15 PROCEDURE — 3074F SYST BP LT 130 MM HG: CPT | Performed by: INTERNAL MEDICINE

## 2024-02-15 PROCEDURE — 3017F COLORECTAL CA SCREEN DOC REV: CPT | Performed by: INTERNAL MEDICINE

## 2024-02-15 PROCEDURE — 3078F DIAST BP <80 MM HG: CPT | Performed by: INTERNAL MEDICINE

## 2024-02-15 PROCEDURE — 99214 OFFICE O/P EST MOD 30 MIN: CPT | Performed by: INTERNAL MEDICINE

## 2024-02-15 RX ORDER — TIZANIDINE 2 MG/1
2 TABLET ORAL EVERY 8 HOURS PRN
Qty: 90 TABLET | Refills: 1 | Status: CANCELLED | OUTPATIENT
Start: 2024-02-15

## 2024-02-15 RX ORDER — IPRATROPIUM BROMIDE 42 UG/1
2 SPRAY, METERED NASAL 4 TIMES DAILY
Qty: 15 ML | Refills: 3 | Status: SHIPPED | OUTPATIENT
Start: 2024-02-15

## 2024-02-15 RX ORDER — METOPROLOL SUCCINATE 50 MG/1
TABLET, EXTENDED RELEASE ORAL
Qty: 90 TABLET | Refills: 1 | Status: SHIPPED | OUTPATIENT
Start: 2024-02-15

## 2024-02-15 NOTE — PROGRESS NOTES
Sayda NASIM Chrissie  1960  female  63 y.o.    SUBJECTIVE:       Chief Complaint   Patient presents with    Follow-up     3 month f/u kidney dr yesterday said protein was a little low        HPI:  Follow-up visit for chronic problems.    Past Medical History:   Diagnosis Date    Alcohol problem drinking     Alcohol withdrawal delirium ([facility]) 2017    Anxiety     Facial bones, closed fracture ([facility]) 2017    Hypertension     Hyponatremia     Malignant hypertension 3/17/2016    Panic attacks      Past Surgical History:   Procedure Laterality Date    COLONOSCOPY  2018    next colonoscopy in , 10 years    COLONOSCOPY N/A 2018    COLONOSCOPY POLYPECTOMY SNARE/COLD BIOPSY performed by Jin Florian MD at [facility] ENDOSCOPY    NECK SURGERY      OTHER SURGICAL HISTORY      Right carotid artery surgery    TOTAL HIP ARTHROPLASTY Left 3/28/2023    LEFT TOTAL HIP REPLACEMENT-DIRECT ANTERIOR; MIRZA performed by Andrea Newsome MD at [facility] OR    TUBAL LIGATION      WRIST GANGLION EXCISION  1988    bilateral [place], [place]     Social History     Socioeconomic History    Marital status:      Spouse name: None    Number of children: 1    Years of education: None    Highest education level: None   Occupational History    Occupation: [employer]      Comment: 2017    Occupation: Previously a nurse's aide   Tobacco Use    Smoking status: Former     Current packs/day: 0.00     Average packs/day: 0.5 packs/day for 38.8 years (19.4 ttl pk-yrs)     Types: Cigarettes     Start date: 1979     Quit date: 10/18/2017     Years since quittin.3     Passive exposure: Past    Smokeless tobacco: Never   Vaping Use    Vaping Use: Never used   Substance and Sexual Activity    Alcohol use: No     Comment: off since 16    Drug use: Yes     Types: Marijuana (Weed)     Comment: couple of puffs--couple of days ago.    Sexual activity: Not Currently     Partners: Male   Social History Narrative

## 2024-02-21 DIAGNOSIS — M62.838 NECK MUSCLE SPASM: ICD-10-CM

## 2024-02-21 DIAGNOSIS — M62.830 BACK SPASM: ICD-10-CM

## 2024-02-21 RX ORDER — TIZANIDINE 2 MG/1
TABLET ORAL
Qty: 90 TABLET | Refills: 1 | Status: SHIPPED | OUTPATIENT
Start: 2024-02-21

## 2024-02-26 ENCOUNTER — TELEPHONE (OUTPATIENT)
Dept: CARDIOLOGY CLINIC | Age: 64
End: 2024-02-26

## 2024-02-26 DIAGNOSIS — I36.1 NONRHEUMATIC TRICUSPID VALVE REGURGITATION: Primary | ICD-10-CM

## 2024-03-11 ENCOUNTER — OFFICE VISIT (OUTPATIENT)
Dept: ORTHOPEDIC SURGERY | Age: 64
End: 2024-03-11
Payer: COMMERCIAL

## 2024-03-11 DIAGNOSIS — Z96.642 S/P TOTAL LEFT HIP ARTHROPLASTY: Primary | ICD-10-CM

## 2024-03-11 PROCEDURE — 1036F TOBACCO NON-USER: CPT | Performed by: ORTHOPAEDIC SURGERY

## 2024-03-11 PROCEDURE — G8428 CUR MEDS NOT DOCUMENT: HCPCS | Performed by: ORTHOPAEDIC SURGERY

## 2024-03-11 PROCEDURE — G8420 CALC BMI NORM PARAMETERS: HCPCS | Performed by: ORTHOPAEDIC SURGERY

## 2024-03-11 PROCEDURE — 3017F COLORECTAL CA SCREEN DOC REV: CPT | Performed by: ORTHOPAEDIC SURGERY

## 2024-03-11 PROCEDURE — G8482 FLU IMMUNIZE ORDER/ADMIN: HCPCS | Performed by: ORTHOPAEDIC SURGERY

## 2024-03-11 PROCEDURE — 99213 OFFICE O/P EST LOW 20 MIN: CPT | Performed by: ORTHOPAEDIC SURGERY

## 2024-03-11 NOTE — PROGRESS NOTES
Patient: Sayda Dickens                  : 1960   MRN: [id number]   Date of Visit: 3/11/24     Physician: Andrea Newsome MD.     Reason for Visit: Status post LUIS     Subjective History of Present Illness:     Sayda Dickens is here for regularly scheduled follow-up s/p LEFT LUIS. Reports they are doing well, occasional aches and pains are controlled with over the counter medications. They do not report fevers, chills or drainage from the incision site    Physical Examination??: ?   General: Patient is alert and oriented x 3 and appears comfortable.   Incision is well-approximated, no erythema, fluctuance   Fires quad, SILT to thigh     Radiographs: AP pelvis/AP/lateral hip views of operative hip with well-positioned total hip arthroplasty. No evidence of fracture subluxation or dislocation.        Assessment and Plan?: The patient is progressing well approximately 6 mo s/p LUIS     She has some left hip weakness in the abductor however is able to do single leg stance and is otherwise comfortable    1. A thorough discussion was had with the patient concerning the ?postoperative course and the patient is in agreement with the plan.   2. Spoke with the patient regarding the use of over-the-counter medications both oral and topical medications for pain control.      _______________________      Andrea Newsome MD

## 2024-03-12 PROBLEM — I49.3 FREQUENT PVCS: Status: ACTIVE | Noted: 2024-03-12

## 2024-03-12 PROBLEM — I35.8 AORTIC VALVE SCLEROSIS: Status: ACTIVE | Noted: 2024-03-12

## 2024-03-12 NOTE — PROGRESS NOTES
performed during the hospital encounter of 02/13/17   EKG 12 lead   Result Value Ref Range    Ventricular Rate 89 BPM    Atrial Rate 89 BPM    P-R Interval 160 ms    QRS Duration 108 ms    Q-T Interval 432 ms    QTc Calculation (Bazett) 525 ms    P Axis 74 degrees    R Axis 57 degrees    T Axis 58 degrees    Diagnosis       Normal sinus rhythm  Baseline artifact obscures  Biatrial enlargement  Prolonged QT  likely  Abnormal ECG  QRS tor and QT longer.    Confirmed by HOMAR RHODES MD ([id number]) on 2/13/2017 4:35:55 PM         Lab Results   Component Value Date    CHOL 200 08/14/2015    CHOL 129 08/14/2015    CHOLFAST 176 05/15/2023    TRIG 79 08/14/2015    TRIGLYCFAST 74 05/15/2023    HDL 85 (H) 05/15/2023    LDLCALC 76 05/15/2023       The 10-year ASCVD risk score (Yomi MARTINEZ, et al., 2019) is: 4.8%    Values used to calculate the score:      Age: 63 years      Sex: Female      Is Non- : No      Diabetic: No      Tobacco smoker: No      Systolic Blood Pressure: 124 mmHg      Is BP treated: Yes      HDL Cholesterol: 85 mg/dL      Total Cholesterol: 176 mg/dL    Lab Results   Component Value Date    PROBNP 1,006 (H) 02/13/2017        Lab Results   Component Value Date    TROPONINI <0.01 03/03/2023    TROPONINI <0.01 02/13/2017     Hemoglobin A1C   Date Value Ref Range Status   03/03/2023 5.1 See comment % Final     Comment:     Comment:  Diagnosis of Diabetes: > or = 6.5%  Increased risk of diabetes (Prediabetes): 5.7-6.4%  Glycemic Control: Nonpregnant Adults: <7.0%                    Pregnant: <6.0%            Lab Results   Component Value Date    TSH 1.05 08/14/2015    TSHREFLEX 1.18 03/03/2023       Lab Results   Component Value Date    WBC 6.5 02/08/2024    HGB 13.8 02/08/2024    HCT 40.1 02/08/2024    MCV 97.2 02/08/2024     02/08/2024     Lab Results   Component Value Date     (L) 02/08/2024    K 4.1 02/08/2024    CL 92 (L) 02/08/2024    CO2 26 02/08/2024    BUN 12

## 2024-03-18 DIAGNOSIS — F41.9 ANXIETY: ICD-10-CM

## 2024-03-18 RX ORDER — ESCITALOPRAM OXALATE 20 MG/1
TABLET ORAL
Qty: 90 TABLET | Refills: 1 | Status: SHIPPED | OUTPATIENT
Start: 2024-03-18

## 2024-03-18 NOTE — TELEPHONE ENCOUNTER
Medication:   Requested Prescriptions     Pending Prescriptions Disp Refills    escitalopram (LEXAPRO) 20 MG tablet [Pharmacy Med Name: ESCITALOPRAM 20 MG TABLET] 90 tablet 1     Sig: TAKE 1 TABLET BY MOUTH DAILY        Last Filled:  9/20/23    Patient Phone Number: [phone] (home)     Last appt: 2/15/2024   Next appt: 5/14/2024    Last OARRS:       4/19/2017     4:01 PM   RX Monitoring   Attestation The Prescription Monitoring Report for this patient was reviewed today.

## 2024-03-22 ENCOUNTER — HOSPITAL ENCOUNTER (OUTPATIENT)
Dept: NON INVASIVE DIAGNOSTICS | Age: 64
Discharge: HOME | End: 2024-03-22
Payer: COMMERCIAL

## 2024-03-22 ENCOUNTER — OFFICE VISIT (OUTPATIENT)
Dept: CARDIOLOGY CLINIC | Age: 64
End: 2024-03-22
Payer: COMMERCIAL

## 2024-03-22 VITALS
WEIGHT: 156 LBS | BODY MASS INDEX: 24.48 KG/M2 | DIASTOLIC BLOOD PRESSURE: 56 MMHG | HEART RATE: 47 BPM | OXYGEN SATURATION: 99 % | SYSTOLIC BLOOD PRESSURE: 124 MMHG | HEIGHT: 67 IN

## 2024-03-22 DIAGNOSIS — I49.3 FREQUENT PVCS: ICD-10-CM

## 2024-03-22 DIAGNOSIS — I35.8 AORTIC VALVE SCLEROSIS: Primary | ICD-10-CM

## 2024-03-22 DIAGNOSIS — I47.29 NSVT (NONSUSTAINED VENTRICULAR TACHYCARDIA) (HCC): ICD-10-CM

## 2024-03-22 DIAGNOSIS — I10 ESSENTIAL HYPERTENSION: ICD-10-CM

## 2024-03-22 DIAGNOSIS — I34.0 NONRHEUMATIC MITRAL VALVE REGURGITATION: ICD-10-CM

## 2024-03-22 DIAGNOSIS — E78.49 OTHER HYPERLIPIDEMIA: ICD-10-CM

## 2024-03-22 PROCEDURE — 3074F SYST BP LT 130 MM HG: CPT | Performed by: INTERNAL MEDICINE

## 2024-03-22 PROCEDURE — G8427 DOCREV CUR MEDS BY ELIG CLIN: HCPCS | Performed by: INTERNAL MEDICINE

## 2024-03-22 PROCEDURE — 1036F TOBACCO NON-USER: CPT | Performed by: INTERNAL MEDICINE

## 2024-03-22 PROCEDURE — 3078F DIAST BP <80 MM HG: CPT | Performed by: INTERNAL MEDICINE

## 2024-03-22 PROCEDURE — 93306 TTE W/DOPPLER COMPLETE: CPT

## 2024-03-22 PROCEDURE — 3017F COLORECTAL CA SCREEN DOC REV: CPT | Performed by: INTERNAL MEDICINE

## 2024-03-22 PROCEDURE — G8420 CALC BMI NORM PARAMETERS: HCPCS | Performed by: INTERNAL MEDICINE

## 2024-03-22 PROCEDURE — 99214 OFFICE O/P EST MOD 30 MIN: CPT | Performed by: INTERNAL MEDICINE

## 2024-03-22 PROCEDURE — G8482 FLU IMMUNIZE ORDER/ADMIN: HCPCS | Performed by: INTERNAL MEDICINE

## 2024-03-22 PROCEDURE — 93000 ELECTROCARDIOGRAM COMPLETE: CPT | Performed by: INTERNAL MEDICINE

## 2024-03-22 NOTE — PATIENT INSTRUCTIONS
Continue current medications.    Echo in 12 months with a clinic visit same day    Call if any new symptoms or problems

## 2024-04-01 RX ORDER — FOLIC ACID 1 MG/1
1000 TABLET ORAL DAILY
Qty: 90 TABLET | Refills: 1 | Status: SHIPPED | OUTPATIENT
Start: 2024-04-01

## 2024-04-01 NOTE — TELEPHONE ENCOUNTER
Last OV: 2/15/2024  Next OV: 5/14/2024    Next appointment due:05/15/24    Last fill:01/02/24  Refills:1   #90

## 2024-04-03 ENCOUNTER — APPOINTMENT (RX ONLY)
Dept: URBAN - METROPOLITAN AREA CLINIC 170 | Facility: CLINIC | Age: 64
Setting detail: DERMATOLOGY
End: 2024-04-03

## 2024-04-03 DIAGNOSIS — D22 MELANOCYTIC NEVI: ICD-10-CM | Status: STABLE

## 2024-04-03 DIAGNOSIS — B35.1 TINEA UNGUIUM: ICD-10-CM | Status: INADEQUATELY CONTROLLED

## 2024-04-03 DIAGNOSIS — D18.0 HEMANGIOMA: ICD-10-CM | Status: STABLE

## 2024-04-03 DIAGNOSIS — L82.1 OTHER SEBORRHEIC KERATOSIS: ICD-10-CM | Status: STABLE

## 2024-04-03 DIAGNOSIS — Z79.899 OTHER LONG TERM (CURRENT) DRUG THERAPY: ICD-10-CM

## 2024-04-03 DIAGNOSIS — L81.4 OTHER MELANIN HYPERPIGMENTATION: ICD-10-CM | Status: STABLE

## 2024-04-03 DIAGNOSIS — Z85.828 PERSONAL HISTORY OF OTHER MALIGNANT NEOPLASM OF SKIN: ICD-10-CM | Status: STABLE

## 2024-04-03 PROBLEM — D22.5 MELANOCYTIC NEVI OF TRUNK: Status: ACTIVE | Noted: 2024-04-03

## 2024-04-03 PROBLEM — D18.01 HEMANGIOMA OF SKIN AND SUBCUTANEOUS TISSUE: Status: ACTIVE | Noted: 2024-04-03

## 2024-04-03 PROCEDURE — ? NAIL CLIPPING FOR PAS

## 2024-04-03 PROCEDURE — ? FULL BODY SKIN EXAM

## 2024-04-03 PROCEDURE — ? HIGH RISK MEDICATION MONITORING

## 2024-04-03 PROCEDURE — ? COUNSELING

## 2024-04-03 PROCEDURE — ? TREATMENT REGIMEN

## 2024-04-03 PROCEDURE — ? PHOTO-DOCUMENTATION

## 2024-04-03 PROCEDURE — ? PRESCRIPTION MEDICATION MANAGEMENT

## 2024-04-03 PROCEDURE — 99214 OFFICE O/P EST MOD 30 MIN: CPT

## 2024-04-03 PROCEDURE — ? ORDER TESTS

## 2024-04-03 ASSESSMENT — LOCATION SIMPLE DESCRIPTION DERM
LOCATION SIMPLE: ABDOMEN
LOCATION SIMPLE: LEFT UPPER BACK
LOCATION SIMPLE: RIGHT GREAT TOE
LOCATION SIMPLE: LEFT GREAT TOE
LOCATION SIMPLE: RIGHT SHOULDER
LOCATION SIMPLE: UPPER BACK

## 2024-04-03 ASSESSMENT — LOCATION ZONE DERM
LOCATION ZONE: TRUNK
LOCATION ZONE: TOENAIL
LOCATION ZONE: ARM

## 2024-04-03 ASSESSMENT — LOCATION DETAILED DESCRIPTION DERM
LOCATION DETAILED: SUPERIOR THORACIC SPINE
LOCATION DETAILED: LEFT INFERIOR UPPER BACK
LOCATION DETAILED: RIGHT GREAT TOENAIL
LOCATION DETAILED: RIGHT ANTERIOR SHOULDER
LOCATION DETAILED: LEFT GREAT TOENAIL
LOCATION DETAILED: PERIUMBILICAL SKIN

## 2024-04-03 ASSESSMENT — NAIL INVOLVEMENT PERCENT: % OF NAIL(S) INVOLVED WITH INFECTION: 5

## 2024-04-03 NOTE — PROCEDURE: PRESCRIPTION MEDICATION MANAGEMENT
Render In Strict Bullet Format?: No
Detail Level: Simple
Plan: If clipping + onychomycosis, will tell pt to complete lab order and plan for Terbinafine 250 mg QD x 90 days and Ciclopirox sol QHS x 1 yr.

## 2024-04-03 NOTE — PROCEDURE: HIGH RISK MEDICATION MONITORING
Valtrex Counseling: I discussed with the patient the risks of valacyclovir including but not limited to kidney damage, nausea, vomiting and severe allergy.  The patient understands that if the infection seems to be worsening or is not improving, they are to call.
Xeljanz Counseling: I discussed with the patient the risks of Xeljanz therapy including increased risk of infection, liver issues, headache, diarrhea, or cold symptoms. Live vaccines should be avoided. They were instructed to call if they have any problems.
Protopic Counseling: Patient may experience a mild burning sensation during topical application. Protopic is not approved in children less than 2 years of age. There have been case reports of hematologic and skin malignancies in patients using topical calcineurin inhibitors although causality is questionable.
Finasteride Pregnancy And Lactation Text: This medication is absolutely contraindicated during pregnancy. It is unknown if it is excreted in breast milk.
Imiquimod Pregnancy And Lactation Text: This medication is Pregnancy Category C. It is unknown if this medication is excreted in breast milk.
Clofazimine Pregnancy And Lactation Text: This medication is Pregnancy Category C and isn't considered safe during pregnancy. It is excreted in breast milk.
Cyclophosphamide Counseling:  I discussed with the patient the risks of cyclophosphamide including but not limited to hair loss, hormonal abnormalities, decreased fertility, abdominal pain, diarrhea, nausea and vomiting, bone marrow suppression and infection. The patient understands that monitoring is required while taking this medication.
Minocycline Counseling: Patient advised regarding possible photosensitivity and discoloration of the teeth, skin, lips, tongue and gums.  Patient instructed to avoid sunlight, if possible.  When exposed to sunlight, patients should wear protective clothing, sunglasses, and sunscreen.  The patient was instructed to call the office immediately if the following severe adverse effects occur:  hearing changes, easy bruising/bleeding, severe headache, or vision changes.  The patient verbalized understanding of the proper use and possible adverse effects of minocycline.  All of the patient's questions and concerns were addressed.
Ketoconazole Pregnancy And Lactation Text: This medication is Pregnancy Category C and it isn't know if it is safe during pregnancy. It is also excreted in breast milk and breast feeding isn't recommended.
Libtayo Counseling- I discussed with the patient the risks of Libtayo including but not limited to nausea, vomiting, diarrhea, and bone or muscle pain.  The patient verbalized understanding of the proper use and possible adverse effects of Libtayo.  All of the patient's questions and concerns were addressed.
Tetracycline Pregnancy And Lactation Text: This medication is Pregnancy Category D and not consider safe during pregnancy. It is also excreted in breast milk.
Detail Level: Detailed
Tazorac Pregnancy And Lactation Text: This medication is not safe during pregnancy. It is unknown if this medication is excreted in breast milk.
Bactrim Counseling:  I discussed with the patient the risks of sulfa antibiotics including but not limited to GI upset, allergic reaction, drug rash, diarrhea, dizziness, photosensitivity, and yeast infections.  Rarely, more serious reactions can occur including but not limited to aplastic anemia, agranulocytosis, methemoglobinemia, blood dyscrasias, liver or kidney failure, lung infiltrates or desquamative/blistering drug rashes.
Cimzia Pregnancy And Lactation Text: This medication crosses the placenta but can be considered safe in certain situations. Cimzia may be excreted in breast milk.
Bactrim Pregnancy And Lactation Text: This medication is Pregnancy Category D and is known to cause fetal risk.  It is also excreted in breast milk.
Albendazole Counseling:  I discussed with the patient the risks of albendazole including but not limited to cytopenia, kidney damage, nausea/vomiting and severe allergy.  The patient understands that this medication is being used in an off-label manner.
Winlevi Pregnancy And Lactation Text: This medication is considered safe during pregnancy and breastfeeding.
Drysol Counseling:  I discussed with the patient the risks of drysol/aluminum chloride including but not limited to skin rash, itching, irritation, burning.
Azelaic Acid Pregnancy And Lactation Text: This medication is considered safe during pregnancy and breast feeding.
Topical Clindamycin Counseling: Patient counseled that this medication may cause skin irritation or allergic reactions.  In the event of skin irritation, the patient was advised to reduce the amount of the drug applied or use it less frequently.   The patient verbalized understanding of the proper use and possible adverse effects of clindamycin.  All of the patient's questions and concerns were addressed.
Libtayo Pregnancy And Lactation Text: This medication is contraindicated in pregnancy and when breast feeding.
Terbinafine Counseling: Patient counseling regarding adverse effects of terbinafine including but not limited to headache, diarrhea, rash, upset stomach, liver function test abnormalities, itching, taste/smell disturbance, nausea, abdominal pain, and flatulence.  There is a rare possibility of liver failure that can occur when taking terbinafine.  The patient understands that a baseline LFT and kidney function test may be required. The patient verbalized understanding of the proper use and possible adverse effects of terbinafine.  All of the patient's questions and concerns were addressed.
High Dose Vitamin A Pregnancy And Lactation Text: High dose vitamin A therapy is contraindicated during pregnancy and breast feeding.
Taltz Counseling: I discussed with the patient the risks of ixekizumab including but not limited to immunosuppression, serious infections, worsening of inflammatory bowel disease and drug reactions.  The patient understands that monitoring is required including a PPD at baseline and must alert us or the primary physician if symptoms of infection or other concerning signs are noted.
Nsaids Pregnancy And Lactation Text: These medications are considered safe up to 30 weeks gestation. It is excreted in breast milk.
Oxybutynin Pregnancy And Lactation Text: This medication is Pregnancy Category B and is considered safe during pregnancy. It is unknown if it is excreted in breast milk.
Siliq Pregnancy And Lactation Text: The risk during pregnancy and breastfeeding is uncertain with this medication.
Hyrimoz Counseling:  I discussed with the patient the risks of adalimumab including but not limited to myelosuppression, immunosuppression, autoimmune hepatitis, demyelinating diseases, lymphoma, and serious infections.  The patient understands that monitoring is required including a PPD at baseline and must alert us or the primary physician if symptoms of infection or other concerning signs are noted.
Cibinqo Pregnancy And Lactation Text: It is unknown if this medication will adversely affect pregnancy or breast feeding.  You should not take this medication if you are currently pregnant or planning a pregnancy or while breastfeeding.
Valtrex Pregnancy And Lactation Text: this medication is Pregnancy Category B and is considered safe during pregnancy. This medication is not directly found in breast milk but it's metabolite acyclovir is present.
Litfulo Counseling: Litfulo (Ritlecitinib) Counseling: I discussed with the patient the risks of Litfulo therapy including but not limited to headache, upper respiratory infections, nausea, diarrhea, acne, and urticaria. Live vaccines should be avoided.  This medication has been linked to serious infections; higher rate of mortality; malignancy and lymphoproliferative disorders; major adverse cardiovascular events; thrombosis; decreases in lymphocytes and platelets; liver enzyme elevations; and CPK elevations.
Minoxidil Counseling: Minoxidil is a topical medication which can increase blood flow where it is applied. It is uncertain how this medication increases hair growth. Side effects are uncommon and include stinging and allergic reactions.
Colchicine Counseling:  Patient counseled regarding adverse effects including but not limited to stomach upset (nausea, vomiting, stomach pain, or diarrhea).  Patient instructed to limit alcohol consumption while taking this medication.  Colchicine may reduce blood counts especially with prolonged use.  The patient understands that monitoring of kidney function and blood counts may be required, especially at baseline. The patient verbalized understanding of the proper use and possible adverse effects of colchicine.  All of the patient's questions and concerns were addressed.
Hyrimoz Pregnancy And Lactation Text: This medication is Pregnancy Category B and is considered safe during pregnancy. It is unknown if this medication is excreted in breast milk.
Xelleonidz Pregnancy And Lactation Text: This medication is Pregnancy Category D and is not considered safe during pregnancy.  The risk during breast feeding is also uncertain.
Clindamycin Counseling: I counseled the patient regarding use of clindamycin as an antibiotic for prophylactic and/or therapeutic purposes. Clindamycin is active against numerous classes of bacteria, including skin bacteria. Side effects may include nausea, diarrhea, gastrointestinal upset, rash, hives, yeast infections, and in rare cases, colitis.
Cyclophosphamide Pregnancy And Lactation Text: This medication is Pregnancy Category D and it isn't considered safe during pregnancy. This medication is excreted in breast milk.
Olanzapine Counseling- I discussed with the patient the common side effects of olanzapine including but are not limited to: lack of energy, dry mouth, increased appetite, sleepiness, tremor, constipation, dizziness, changes in behavior, or restlessness.  Explained that teenagers are more likely to experience headaches, abdominal pain, pain in the arms or legs, tiredness, and sleepiness.  Serious side effects include but are not limited: increased risk of death in elderly patients who are confused, have memory loss, or dementia-related psychosis; hyperglycemia; increased cholesterol and triglycerides; and weight gain.
Protopic Pregnancy And Lactation Text: This medication is Pregnancy Category C. It is unknown if this medication is excreted in breast milk when applied topically.
Birth Control Pills Counseling: Birth Control Pill Counseling: I discussed with the patient the potential side effects of OCPs including but not limited to increased risk of stroke, heart attack, thrombophlebitis, deep venous thrombosis, hepatic adenomas, breast changes, GI upset, headaches, and depression.  The patient verbalized understanding of the proper use and possible adverse effects of OCPs. All of the patient's questions and concerns were addressed.
Cosentyx Counseling:  I discussed with the patient the risks of Cosentyx including but not limited to worsening of Crohn's disease, immunosuppression, allergic reactions and infections.  The patient understands that monitoring is required including a PPD at baseline and must alert us or the primary physician if symptoms of infection or other concerning signs are noted.
Include Pregnancy/Lactation Warning?: No
Birth Control Pills Pregnancy And Lactation Text: This medication should be avoided if pregnant and for the first 30 days post-partum.
Benzoyl Peroxide Counseling: Patient counseled that medicine may cause skin irritation and bleach clothing.  In the event of skin irritation, the patient was advised to reduce the amount of the drug applied or use it less frequently.   The patient verbalized understanding of the proper use and possible adverse effects of benzoyl peroxide.  All of the patient's questions and concerns were addressed.
Rhofade Counseling: Rhofade is a topical medication which can decrease superficial blood flow where applied. Side effects are uncommon and include stinging, redness and allergic reactions.
Fluconazole Counseling:  Patient counseled regarding adverse effects of fluconazole including but not limited to headache, diarrhea, nausea, upset stomach, liver function test abnormalities, taste disturbance, and stomach pain.  There is a rare possibility of liver failure that can occur when taking fluconazole.  The patient understands that monitoring of LFTs and kidney function test may be required, especially at baseline. The patient verbalized understanding of the proper use and possible adverse effects of fluconazole.  All of the patient's questions and concerns were addressed.
Albendazole Pregnancy And Lactation Text: This medication is Pregnancy Category C and it isn't known if it is safe during pregnancy. It is also excreted in breast milk.
Odomzo Counseling- I discussed with the patient the risks of Odomzo including but not limited to nausea, vomiting, diarrhea, constipation, weight loss, changes in the sense of taste, decreased appetite, muscle spasms, and hair loss.  The patient verbalized understanding of the proper use and possible adverse effects of Odomzo.  All of the patient's questions and concerns were addressed.
Cephalexin Counseling: I counseled the patient regarding use of cephalexin as an antibiotic for prophylactic and/or therapeutic purposes. Cephalexin (commonly prescribed under brand name Keflex) is a cephalosporin antibiotic which is active against numerous classes of bacteria, including most skin bacteria. Side effects may include nausea, diarrhea, gastrointestinal upset, rash, hives, yeast infections, and in rare cases, hepatitis, kidney disease, seizures, fever, confusion, neurologic symptoms, and others. Patients with severe allergies to penicillin medications are cautioned that there is about a 10% incidence of cross-reactivity with cephalosporins. When possible, patients with penicillin allergies should use alternatives to cephalosporins for antibiotic therapy.
Terbinafine Pregnancy And Lactation Text: This medication is Pregnancy Category B and is considered safe during pregnancy. It is also excreted in breast milk and breast feeding isn't recommended.
Propranolol Counseling:  I discussed with the patient the risks of propranolol including but not limited to low heart rate, low blood pressure, low blood sugar, restlessness and increased cold sensitivity. They should call the office if they experience any of these side effects.
Zyclara Counseling:  I discussed with the patient the risks of imiquimod including but not limited to erythema, scaling, itching, weeping, crusting, and pain.  Patient understands that the inflammatory response to imiquimod is variable from person to person and was educated regarded proper titration schedule.  If flu-like symptoms develop, patient knows to discontinue the medication and contact us.
Hydroxychloroquine Counseling:  I discussed with the patient that a baseline ophthalmologic exam is needed at the start of therapy and every year thereafter while on therapy. A CBC may also be warranted for monitoring.  The side effects of this medication were discussed with the patient, including but not limited to agranulocytosis, aplastic anemia, seizures, rashes, retinopathy, and liver toxicity. Patient instructed to call the office should any adverse effect occur.  The patient verbalized understanding of the proper use and possible adverse effects of Plaquenil.  All the patient's questions and concerns were addressed.
Propranolol Pregnancy And Lactation Text: This medication is Pregnancy Category C and it isn't known if it is safe during pregnancy. It is excreted in breast milk.
Litfulo Pregnancy And Lactation Text: There is insufficient data to evaluate whether or not Litfulo is safe to use during pregnancy.  Breastfeeding is not recommended during treatment.
Odomzo Pregnancy And Lactation Text: This medication is Pregnancy Category X and is absolutely contraindicated during pregnancy. It is unknown if it is excreted in breast milk.
Minoxidil Pregnancy And Lactation Text: This medication has not been assigned a Pregnancy Risk Category but animal studies failed to show danger with the topical medication. It is unknown if the medication is excreted in breast milk.
Elidel Counseling: Patient may experience a mild burning sensation during topical application. Elidel is not approved in children less than 2 years of age. There have been case reports of hematologic and skin malignancies in patients using topical calcineurin inhibitors although causality is questionable.
Tremfya Counseling: I discussed with the patient the risks of guselkumab including but not limited to immunosuppression, serious infections, worsening of inflammatory bowel disease and drug reactions.  The patient understands that monitoring is required including a PPD at baseline and must alert us or the primary physician if symptoms of infection or other concerning signs are noted.
Clindamycin Pregnancy And Lactation Text: This medication can be used in pregnancy if certain situations. Clindamycin is also present in breast milk.
Olanzapine Pregnancy And Lactation Text: This medication is pregnancy category C.   There are no adequate and well controlled trials with olanzapine in pregnant females.  Olanzapine should be used during pregnancy only if the potential benefit justifies the potential risk to the fetus.   In a study in lactating healthy women, olanzapine was excreted in breast milk.  It is recommended that women taking olanzapine should not breast feed.
Ivermectin Counseling:  Patient instructed to take medication on an empty stomach with a full glass of water.  Patient informed of potential adverse effects including but not limited to nausea, diarrhea, dizziness, itching, and swelling of the extremities or lymph nodes.  The patient verbalized understanding of the proper use and possible adverse effects of ivermectin.  All of the patient's questions and concerns were addressed.
Cyclosporine Counseling:  I discussed with the patient the risks of cyclosporine including but not limited to hypertension, gingival hyperplasia,myelosuppression, immunosuppression, liver damage, kidney damage, neurotoxicity, lymphoma, and serious infections. The patient understands that monitoring is required including baseline blood pressure, CBC, CMP, lipid panel and uric acid, and then 1-2 times monthly CMP and blood pressure.
Quinolones Counseling:  I discussed with the patient the risks of fluoroquinolones including but not limited to GI upset, allergic reaction, drug rash, diarrhea, dizziness, photosensitivity, yeast infections, liver function test abnormalities, tendonitis/tendon rupture.
Azelaic Acid Counseling: Patient counseled that medicine may cause skin irritation and to avoid applying near the eyes.  In the event of skin irritation, the patient was advised to reduce the amount of the drug applied or use it less frequently.   The patient verbalized understanding of the proper use and possible adverse effects of azelaic acid.  All of the patient's questions and concerns were addressed.
Rhofade Pregnancy And Lactation Text: This medication has not been assigned a Pregnancy Risk Category. It is unknown if the medication is excreted in breast milk.
Cimetidine Counseling:  I discussed with the patient the risks of Cimetidine including but not limited to gynecomastia, headache, diarrhea, nausea, drowsiness, arrhythmias, pancreatitis, skin rashes, psychosis, bone marrow suppression and kidney toxicity.
Topical Ketoconazole Counseling: Patient counseled that this medication may cause skin irritation or allergic reactions.  In the event of skin irritation, the patient was advised to reduce the amount of the drug applied or use it less frequently.   The patient verbalized understanding of the proper use and possible adverse effects of ketoconazole.  All of the patient's questions and concerns were addressed.
Benzoyl Peroxide Pregnancy And Lactation Text: This medication is Pregnancy Category C. It is unknown if benzoyl peroxide is excreted in breast milk.
Acitretin Counseling:  I discussed with the patient the risks of acitretin including but not limited to hair loss, dry lips/skin/eyes, liver damage, hyperlipidemia, depression/suicidal ideation, photosensitivity.  Serious rare side effects can include but are not limited to pancreatitis, pseudotumor cerebri, bony changes, clot formation/stroke/heart attack.  Patient understands that alcohol is contraindicated since it can result in liver toxicity and significantly prolong the elimination of the drug by many years.
Spironolactone Counseling: Patient advised regarding risks of diarrhea, abdominal pain, hyperkalemia, birth defects (for female patients), liver toxicity and renal toxicity. The patient may need blood work to monitor liver and kidney function and potassium levels while on therapy. The patient verbalized understanding of the proper use and possible adverse effects of spironolactone.  All of the patient's questions and concerns were addressed.
Ilumya Counseling: I discussed with the patient the risks of tildrakizumab including but not limited to immunosuppression, malignancy, posterior leukoencephalopathy syndrome, and serious infections.  The patient understands that monitoring is required including a PPD at baseline and must alert us or the primary physician if symptoms of infection or other concerning signs are noted.
Cephalexin Pregnancy And Lactation Text: This medication is Pregnancy Category B and considered safe during pregnancy.  It is also excreted in breast milk but can be used safely for shorter doses.
Fluconazole Pregnancy And Lactation Text: This medication is Pregnancy Category C and it isn't know if it is safe during pregnancy. It is also excreted in breast milk.
Olumiant Counseling: I discussed with the patient the risks of Olumiant therapy including but not limited to upper respiratory tract infections, shingles, cold sores, and nausea. Live vaccines should be avoided.  This medication has been linked to serious infections; higher rate of mortality; malignancy and lymphoproliferative disorders; major adverse cardiovascular events; thrombosis; gastrointestinal perforations; neutropenia; lymphopenia; anemia; liver enzyme elevations; and lipid elevations.
Simponi Counseling:  I discussed with the patient the risks of golimumab including but not limited to myelosuppression, immunosuppression, autoimmune hepatitis, demyelinating diseases, lymphoma, and serious infections.  The patient understands that monitoring is required including a PPD at baseline and must alert us or the primary physician if symptoms of infection or other concerning signs are noted.
Carac Counseling:  I discussed with the patient the risks of Carac including but not limited to erythema, scaling, itching, weeping, crusting, and pain.
Mirvaso Counseling: Mirvaso is a topical medication which can decrease superficial blood flow where applied. Side effects are uncommon and include stinging, redness and allergic reactions.
SSKI Counseling:  I discussed with the patient the risks of SSKI including but not limited to thyroid abnormalities, metallic taste, GI upset, fever, headache, acne, arthralgias, paraesthesias, lymphadenopathy, easy bleeding, arrhythmias, and allergic reaction.
Cyclosporine Pregnancy And Lactation Text: This medication is Pregnancy Category C and it isn't know if it is safe during pregnancy. This medication is excreted in breast milk.
Oral Minoxidil Counseling- I discussed with the patient the risks of oral minoxidil including but not limited to shortness of breath, swelling of the feet or ankles, dizziness, lightheadedness, unwanted hair growth and allergic reaction.  The patient verbalized understanding of the proper use and possible adverse effects of oral minoxidil.  All of the patient's questions and concerns were addressed.
Doxycycline Counseling:  Patient counseled regarding possible photosensitivity and increased risk for sunburn.  Patient instructed to avoid sunlight, if possible.  When exposed to sunlight, patients should wear protective clothing, sunglasses, and sunscreen.  The patient was instructed to call the office immediately if the following severe adverse effects occur:  hearing changes, easy bruising/bleeding, severe headache, or vision changes.  The patient verbalized understanding of the proper use and possible adverse effects of doxycycline.  All of the patient's questions and concerns were addressed.
Hydroxychloroquine Pregnancy And Lactation Text: This medication has been shown to cause fetal harm but it isn't assigned a Pregnancy Risk Category. There are small amounts excreted in breast milk.
Dapsone Counseling: I discussed with the patient the risks of dapsone including but not limited to hemolytic anemia, agranulocytosis, rashes, methemoglobinemia, kidney failure, peripheral neuropathy, headaches, GI upset, and liver toxicity.  Patients who start dapsone require monitoring including baseline LFTs and weekly CBCs for the first month, then every month thereafter.  The patient verbalized understanding of the proper use and possible adverse effects of dapsone.  All of the patient's questions and concerns were addressed.
Dutasteride Male Counseling: Dustasteride Counseling:  I discussed with the patient the risks of use of dutasteride including but not limited to decreased libido, decreased ejaculate volume, and gynecomastia. Women who can become pregnant should not handle medication.  All of the patient's questions and concerns were addressed.
Griseofulvin Counseling:  I discussed with the patient the risks of griseofulvin including but not limited to photosensitivity, cytopenia, liver damage, nausea/vomiting and severe allergy.  The patient understands that this medication is best absorbed when taken with a fatty meal (e.g., ice cream or french fries).
Solaraze Counseling:  I discussed with the patient the risks of Solaraze including but not limited to erythema, scaling, itching, weeping, crusting, and pain.
Dapsone Pregnancy And Lactation Text: This medication is Pregnancy Category C and is not considered safe during pregnancy or breast feeding.
Methotrexate Counseling:  Patient counseled regarding adverse effects of methotrexate including but not limited to nausea, vomiting, abnormalities in liver function tests. Patients may develop mouth sores, rash, diarrhea, and abnormalities in blood counts. The patient understands that monitoring is required including LFT's and blood counts.  There is a rare possibility of scarring of the liver and lung problems that can occur when taking methotrexate. Persistent nausea, loss of appetite, pale stools, dark urine, cough, and shortness of breath should be reported immediately. Patient advised to discontinue methotrexate treatment at least three months before attempting to become pregnant.  I discussed the need for folate supplements while taking methotrexate.  These supplements can decrease side effects during methotrexate treatment. The patient verbalized understanding of the proper use and possible adverse effects of methotrexate.  All of the patient's questions and concerns were addressed.
Rifampin Counseling: I discussed with the patient the risks of rifampin including but not limited to liver damage, kidney damage, red-orange body fluids, nausea/vomiting and severe allergy.
Dupixent Counseling: I discussed with the patient the risks of dupilumab including but not limited to eye infection and irritation, cold sores, injection site reactions, worsening of asthma, allergic reactions and increased risk of parasitic infection.  Live vaccines should be avoided while taking dupilumab. Dupilumab will also interact with certain medications such as warfarin and cyclosporine. The patient understands that monitoring is required and they must alert us or the primary physician if symptoms of infection or other concerning signs are noted.
Opioid Counseling: I discussed with the patient the potential side effects of opioids including but not limited to addiction, altered mental status, and depression. I stressed avoiding alcohol, benzodiazepines, muscle relaxants and sleep aids unless specifically okayed by a physician. The patient verbalized understanding of the proper use and possible adverse effects of opioids. All of the patient's questions and concerns were addressed. They were instructed to flush the remaining pills down the toilet if they did not need them for pain.
Acitretin Pregnancy And Lactation Text: This medication is Pregnancy Category X and should not be given to women who are pregnant or may become pregnant in the future. This medication is excreted in breast milk.
Spironolactone Pregnancy And Lactation Text: This medication can cause feminization of the male fetus and should be avoided during pregnancy. The active metabolite is also found in breast milk.
Dupixent Pregnancy And Lactation Text: This medication likely crosses the placenta but the risk for the fetus is uncertain. This medication is excreted in breast milk.
Carac Pregnancy And Lactation Text: This medication is Pregnancy Category X and contraindicated in pregnancy and in women who may become pregnant. It is unknown if this medication is excreted in breast milk.
Topical Sulfur Applications Counseling: Topical Sulfur Counseling: Patient counseled that this medication may cause skin irritation or allergic reactions.  In the event of skin irritation, the patient was advised to reduce the amount of the drug applied or use it less frequently.   The patient verbalized understanding of the proper use and possible adverse effects of topical sulfur application.  All of the patient's questions and concerns were addressed.
Bexarotene Counseling:  I discussed with the patient the risks of bexarotene including but not limited to hair loss, dry lips/skin/eyes, liver abnormalities, hyperlipidemia, pancreatitis, depression/suicidal ideation, photosensitivity, drug rash/allergic reactions, hypothyroidism, anemia, leukopenia, infection, cataracts, and teratogenicity.  Patient understands that they will need regular blood tests to check lipid profile, liver function tests, white blood cell count, thyroid function tests and pregnancy test if applicable.
Doxycycline Pregnancy And Lactation Text: This medication is Pregnancy Category D and not consider safe during pregnancy. It is also excreted in breast milk but is considered safe for shorter treatment courses.
Eucrisa Counseling: Patient may experience a mild burning sensation during topical application. Eucrisa is not approved in children less than 2 years of age.
Xolair Counseling:  Patient informed of potential adverse effects including but not limited to fever, muscle aches, rash and allergic reactions.  The patient verbalized understanding of the proper use and possible adverse effects of Xolair.  All of the patient's questions and concerns were addressed.
Sski Pregnancy And Lactation Text: This medication is Pregnancy Category D and isn't considered safe during pregnancy. It is excreted in breast milk.
Low Dose Naltrexone Counseling- I discussed with the patient the potential risks and side effects of low dose naltrexone including but not limited to: more vivid dreams, headaches, nausea, vomiting, abdominal pain, fatigue, dizziness, and anxiety.
Oral Minoxidil Pregnancy And Lactation Text: This medication should only be used when clearly needed if you are pregnant, attempting to become pregnant or breast feeding.
Olumiant Pregnancy And Lactation Text: Based on animal studies, Olumiant may cause embryo-fetal harm when administered to pregnant women.  The medication should not be used in pregnancy.  Breastfeeding is not recommended during treatment.
Infliximab Counseling:  I discussed with the patient the risks of infliximab including but not limited to myelosuppression, immunosuppression, autoimmune hepatitis, demyelinating diseases, lymphoma, and serious infections.  The patient understands that monitoring is required including a PPD at baseline and must alert us or the primary physician if symptoms of infection or other concerning signs are noted.
Doxepin Counseling:  Patient advised that the medication is sedating and not to drive a car after taking this medication. Patient informed of potential adverse effects including but not limited to dry mouth, urinary retention, and blurry vision.  The patient verbalized understanding of the proper use and possible adverse effects of doxepin.  All of the patient's questions and concerns were addressed.
Rinvoq Counseling: I discussed with the patient the risks of Rinvoq therapy including but not limited to upper respiratory tract infections, shingles, cold sores, bronchitis, nausea, cough, fever, acne, and headache. Live vaccines should be avoided.  This medication has been linked to serious infections; higher rate of mortality; malignancy and lymphoproliferative disorders; major adverse cardiovascular events; thrombosis; thrombocytopenia, anemia, and neutropenia; lipid elevations; liver enzyme elevations; and gastrointestinal perforations.
Opzelura Counseling:  I discussed with the patient the risks of Opzelura including but not limited to nasopharngitis, bronchitis, ear infection, eosinophila, hives, diarrhea, folliculitis, tonsillitis, and rhinorrhea.  Taken orally, this medication has been linked to serious infections; higher rate of mortality; malignancy and lymphoproliferative disorders; major adverse cardiovascular events; thrombosis; thrombocytopenia, anemia, and neutropenia; and lipid elevations.
Gabapentin Counseling: I discussed with the patient the risks of gabapentin including but not limited to dizziness, somnolence, fatigue and ataxia.
Dutasteride Female Counseling: Dutasteride Counseling:  I discussed with the patient the risks of use of dutasteride including but not limited to decreased libido and sexual dysfunction. Explained the teratogenic nature of the medication and stressed the importance of not getting pregnant during treatment. All of the patient's questions and concerns were addressed.
Erythromycin Counseling:  I discussed with the patient the risks of erythromycin including but not limited to GI upset, allergic reaction, drug rash, diarrhea, increase in liver enzymes, and yeast infections.
Xolair Pregnancy And Lactation Text: This medication is Pregnancy Category B and is considered safe during pregnancy. This medication is excreted in breast milk.
Griseofulvin Pregnancy And Lactation Text: This medication is Pregnancy Category X and is known to cause serious birth defects. It is unknown if this medication is excreted in breast milk but breast feeding should be avoided.
Solaraze Pregnancy And Lactation Text: This medication is Pregnancy Category B and is considered safe. There is some data to suggest avoiding during the third trimester. It is unknown if this medication is excreted in breast milk.
Methotrexate Pregnancy And Lactation Text: This medication is Pregnancy Category X and is known to cause fetal harm. This medication is excreted in breast milk.
Opioid Pregnancy And Lactation Text: These medications can lead to premature delivery and should be avoided during pregnancy. These medications are also present in breast milk in small amounts.
Rifampin Pregnancy And Lactation Text: This medication is Pregnancy Category C and it isn't know if it is safe during pregnancy. It is also excreted in breast milk and should not be used if you are breast feeding.
Adbry Counseling: I discussed with the patient the risks of tralokinumab including but not limited to eye infection and irritation, cold sores, injection site reactions, worsening of asthma, allergic reactions and increased risk of parasitic infection.  Live vaccines should be avoided while taking tralokinumab. The patient understands that monitoring is required and they must alert us or the primary physician if symptoms of infection or other concerning signs are noted.
Topical Sulfur Applications Pregnancy And Lactation Text: This medication is Pregnancy Category C and has an unknown safety profile during pregnancy. It is unknown if this topical medication is excreted in breast milk.
Prednisone Counseling:  I discussed with the patient the risks of prolonged use of prednisone including but not limited to weight gain, insomnia, osteoporosis, mood changes, diabetes, susceptibility to infection, glaucoma and high blood pressure.  In cases where prednisone use is prolonged, patients should be monitored with blood pressure checks, serum glucose levels and an eye exam.  Additionally, the patient may need to be placed on GI prophylaxis, PCP prophylaxis, and calcium and vitamin D supplementation and/or a bisphosphonate.  The patient verbalized understanding of the proper use and the possible adverse effects of prednisone.  All of the patient's questions and concerns were addressed.
Calcipotriene Counseling:  I discussed with the patient the risks of calcipotriene including but not limited to erythema, scaling, itching, and irritation.
Topical Retinoid counseling:  Patient advised to apply a pea-sized amount only at bedtime and wait 30 minutes after washing their face before applying.  If too drying, patient may add a non-comedogenic moisturizer. The patient verbalized understanding of the proper use and possible adverse effects of retinoids.  All of the patient's questions and concerns were addressed.
Itraconazole Counseling:  I discussed with the patient the risks of itraconazole including but not limited to liver damage, nausea/vomiting, neuropathy, and severe allergy.  The patient understands that this medication is best absorbed when taken with acidic beverages such as non-diet cola or ginger ale.  The patient understands that monitoring is required including baseline LFTs and repeat LFTs at intervals.  The patient understands that they are to contact us or the primary physician if concerning signs are noted.
Bexarotene Pregnancy And Lactation Text: This medication is Pregnancy Category X and should not be given to women who are pregnant or may become pregnant. This medication should not be used if you are breast feeding.
Skyrizi Counseling: I discussed with the patient the risks of risankizumab-rzaa including but not limited to immunosuppression, and serious infections.  The patient understands that monitoring is required including a PPD at baseline and must alert us or the primary physician if symptoms of infection or other concerning signs are noted.
Low Dose Naltrexone Pregnancy And Lactation Text: Naltrexone is pregnancy category C.  There have been no adequate and well-controlled studies in pregnant women.  It should be used in pregnancy only if the potential benefit justifies the potential risk to the fetus.   Limited data indicates that naltrexone is minimally excreted into breastmilk.
Azathioprine Counseling:  I discussed with the patient the risks of azathioprine including but not limited to myelosuppression, immunosuppression, hepatotoxicity, lymphoma, and infections.  The patient understands that monitoring is required including baseline LFTs, Creatinine, possible TPMP genotyping and weekly CBCs for the first month and then every 2 weeks thereafter.  The patient verbalized understanding of the proper use and possible adverse effects of azathioprine.  All of the patient's questions and concerns were addressed.
Enbrel Counseling:  I discussed with the patient the risks of etanercept including but not limited to myelosuppression, immunosuppression, autoimmune hepatitis, demyelinating diseases, lymphoma, and infections.  The patient understands that monitoring is required including a PPD at baseline and must alert us or the primary physician if symptoms of infection or other concerning signs are noted.
Thalidomide Counseling: I discussed with the patient the risks of thalidomide including but not limited to birth defects, anxiety, weakness, chest pain, dizziness, cough and severe allergy.
Niacinamide Counseling: I recommended taking niacin or niacinamide, also know as vitamin B3, twice daily. Recent evidence suggests that taking vitamin B3 (500 mg twice daily) can reduce the risk of actinic keratoses and non-melanoma skin cancers. Side effects of vitamin B3 include flushing and headache.
Rituxan Counseling:  I discussed with the patient the risks of Rituxan infusions. Side effects can include infusion reactions, severe drug rashes including mucocutaneous reactions, reactivation of latent hepatitis and other infections and rarely progressive multifocal leukoencephalopathy.  All of the patient's questions and concerns were addressed.
Opzelura Pregnancy And Lactation Text: There is insufficient data to evaluate drug-associated risk for major birth defects, miscarriage, or other adverse maternal or fetal outcomes.  There is a pregnancy registry that monitors pregnancy outcomes in pregnant persons exposed to the medication during pregnancy.  It is unknown if this medication is excreted in breast milk.  Do not breastfeed during treatment and for about 4 weeks after the last dose.
Hydroquinone Counseling:  Patient advised that medication may result in skin irritation, lightening (hypopigmentation), dryness, and burning.  In the event of skin irritation, the patient was advised to reduce the amount of the drug applied or use it less frequently.  Rarely, spots that are treated with hydroquinone can become darker (pseudoochronosis).  Should this occur, patient instructed to stop medication and call the office. The patient verbalized understanding of the proper use and possible adverse effects of hydroquinone.  All of the patient's questions and concerns were addressed.
Arava Counseling:  Patient counseled regarding adverse effects of Arava including but not limited to nausea, vomiting, abnormalities in liver function tests. Patients may develop mouth sores, rash, diarrhea, and abnormalities in blood counts. The patient understands that monitoring is required including LFTs and blood counts.  There is a rare possibility of scarring of the liver and lung problems that can occur when taking methotrexate. Persistent nausea, loss of appetite, pale stools, dark urine, cough, and shortness of breath should be reported immediately. Patient advised to discontinue Arava treatment and consult with a physician prior to attempting conception. The patient will have to undergo a treatment to eliminate Arava from the body prior to conception.
Azathioprine Pregnancy And Lactation Text: This medication is Pregnancy Category D and isn't considered safe during pregnancy. It is unknown if this medication is excreted in breast milk.
Sarecycline Counseling: Patient advised regarding possible photosensitivity and discoloration of the teeth, skin, lips, tongue and gums.  Patient instructed to avoid sunlight, if possible.  When exposed to sunlight, patients should wear protective clothing, sunglasses, and sunscreen.  The patient was instructed to call the office immediately if the following severe adverse effects occur:  hearing changes, easy bruising/bleeding, severe headache, or vision changes.  The patient verbalized understanding of the proper use and possible adverse effects of sarecycline.  All of the patient's questions and concerns were addressed.
Rinvoq Pregnancy And Lactation Text: Based on animal studies, Rinvoq may cause embryo-fetal harm when administered to pregnant women.  The medication should not be used in pregnancy.  Breastfeeding is not recommended during treatment and for 6 days after the last dose.
Adbry Pregnancy And Lactation Text: It is unknown if this medication will adversely affect pregnancy or breast feeding.
Erythromycin Pregnancy And Lactation Text: This medication is Pregnancy Category B and is considered safe during pregnancy. It is also excreted in breast milk.
Dutasteride Pregnancy And Lactation Text: This medication is absolutely contraindicated in women, especially during pregnancy and breast feeding. Feminization of male fetuses is possible if taking while pregnant.
Doxepin Pregnancy And Lactation Text: This medication is Pregnancy Category C and it isn't known if it is safe during pregnancy. It is also excreted in breast milk and breast feeding isn't recommended.
Finasteride Male Counseling: Finasteride Counseling:  I discussed with the patient the risks of use of finasteride including but not limited to decreased libido, decreased ejaculate volume, gynecomastia, and depression. Women should not handle medication.  All of the patient's questions and concerns were addressed.
Bimzelx Counseling:  I discussed with the patient the risks of Bimzelx including but not limited to depression, immunosuppression, allergic reactions and infections.  The patient understands that monitoring is required including a PPD at baseline and must alert us or the primary physician if symptoms of infection or other concerning signs are noted.
Hydroxyzine Counseling: Patient advised that the medication is sedating and not to drive a car after taking this medication.  Patient informed of potential adverse effects including but not limited to dry mouth, urinary retention, and blurry vision.  The patient verbalized understanding of the proper use and possible adverse effects of hydroxyzine.  All of the patient's questions and concerns were addressed.
Wartpeel Counseling:  I discussed with the patient the risks of Wartpeel including but not limited to erythema, scaling, itching, weeping, crusting, and pain.
Calcipotriene Pregnancy And Lactation Text: This medication has not been proven safe during pregnancy. It is unknown if this medication is excreted in breast milk.
Azithromycin Counseling:  I discussed with the patient the risks of azithromycin including but not limited to GI upset, allergic reaction, drug rash, diarrhea, and yeast infections.
Isotretinoin Counseling: Patient should get monthly blood tests, not donate blood, not drive at night if vision affected, not share medication, and not undergo elective surgery for 6 months after tx completed. Side effects reviewed, pt to contact office should one occur.
Otezla Counseling: The side effects of Otezla were discussed with the patient, including but not limited to worsening or new depression, weight loss, diarrhea, nausea, upper respiratory tract infection, and headache. Patient instructed to call the office should any adverse effect occur.  The patient verbalized understanding of the proper use and possible adverse effects of Otezla.  All the patient's questions and concerns were addressed.
Erivedge Counseling- I discussed with the patient the risks of Erivedge including but not limited to nausea, vomiting, diarrhea, constipation, weight loss, changes in the sense of taste, decreased appetite, muscle spasms, and hair loss.  The patient verbalized understanding of the proper use and possible adverse effects of Erivedge.  All of the patient's questions and concerns were addressed.
Otezla Pregnancy And Lactation Text: This medication is Pregnancy Category C and it isn't known if it is safe during pregnancy. It is unknown if it is excreted in breast milk.
Sotyktu Counseling:  I discussed the most common side effects of Sotyktu including: common cold, sore throat, sinus infections, cold sores, canker sores, folliculitis, and acne.  I also discussed more serious side effects of Sotyktu including but not limited to: serious allergic reactions; increased risk for infections such as TB; cancers such as lymphomas; rhabdomyolysis and elevated CPK; and elevated triglycerides and liver enzymes. 
5-Fu Counseling: 5-Fluorouracil Counseling:  I discussed with the patient the risks of 5-fluorouracil including but not limited to erythema, scaling, itching, weeping, crusting, and pain.
Stelara Counseling:  I discussed with the patient the risks of ustekinumab including but not limited to immunosuppression, malignancy, posterior leukoencephalopathy syndrome, and serious infections.  The patient understands that monitoring is required including a PPD at baseline and must alert us or the primary physician if symptoms of infection or other concerning signs are noted.
Niacinamide Pregnancy And Lactation Text: These medications are considered safe during pregnancy.
Cellcept Counseling:  I discussed with the patient the risks of mycophenolate mofetil including but not limited to infection/immunosuppression, GI upset, hypokalemia, hypercholesterolemia, bone marrow suppression, lymphoproliferative disorders, malignancy, GI ulceration/bleed/perforation, colitis, interstitial lung disease, kidney failure, progressive multifocal leukoencephalopathy, and birth defects.  The patient understands that monitoring is required including a baseline creatinine and regular CBC testing. In addition, patient must alert us immediately if symptoms of infection or other concerning signs are noted.
Tranexamic Acid Counseling:  Patient advised of the small risk of bleeding problems with tranexamic acid. They were also instructed to call if they developed any nausea, vomiting or diarrhea. All of the patient's questions and concerns were addressed.
Metronidazole Counseling:  I discussed with the patient the risks of metronidazole including but not limited to seizures, nausea/vomiting, a metallic taste in the mouth, nausea/vomiting and severe allergy.
Rituxan Pregnancy And Lactation Text: This medication is Pregnancy Category C and it isn't know if it is safe during pregnancy. It is unknown if this medication is excreted in breast milk but similar antibodies are known to be excreted.
Picato Counseling:  I discussed with the patient the risks of Picato including but not limited to erythema, scaling, itching, weeping, crusting, and pain.
Glycopyrrolate Counseling:  I discussed with the patient the risks of glycopyrrolate including but not limited to skin rash, drowsiness, dry mouth, difficulty urinating, and blurred vision.
Hydroxyzine Pregnancy And Lactation Text: This medication is not safe during pregnancy and should not be taken. It is also excreted in breast milk and breast feeding isn't recommended.
Tazorac Counseling:  Patient advised that medication is irritating and drying.  Patient may need to apply sparingly and wash off after an hour before eventually leaving it on overnight.  The patient verbalized understanding of the proper use and possible adverse effects of tazorac.  All of the patient's questions and concerns were addressed.
Glycopyrrolate Pregnancy And Lactation Text: This medication is Pregnancy Category B and is considered safe during pregnancy. It is unknown if it is excreted breast milk.
Tetracycline Counseling: Patient counseled regarding possible photosensitivity and increased risk for sunburn.  Patient instructed to avoid sunlight, if possible.  When exposed to sunlight, patients should wear protective clothing, sunglasses, and sunscreen.  The patient was instructed to call the office immediately if the following severe adverse effects occur:  hearing changes, easy bruising/bleeding, severe headache, or vision changes.  The patient verbalized understanding of the proper use and possible adverse effects of tetracycline.  All of the patient's questions and concerns were addressed. Patient understands to avoid pregnancy while on therapy due to potential birth defects.
Ketoconazole Counseling:   Patient counseled regarding improving absorption with orange juice.  Adverse effects include but are not limited to breast enlargement, headache, diarrhea, nausea, upset stomach, liver function test abnormalities, taste disturbance, and stomach pain.  There is a rare possibility of liver failure that can occur when taking ketoconazole. The patient understands that monitoring of LFTs may be required, especially at baseline. The patient verbalized understanding of the proper use and possible adverse effects of ketoconazole.  All of the patient's questions and concerns were addressed.
Azithromycin Pregnancy And Lactation Text: This medication is considered safe during pregnancy and is also secreted in breast milk.
Humira Counseling:  I discussed with the patient the risks of adalimumab including but not limited to myelosuppression, immunosuppression, autoimmune hepatitis, demyelinating diseases, lymphoma, and serious infections.  The patient understands that monitoring is required including a PPD at baseline and must alert us or the primary physician if symptoms of infection or other concerning signs are noted.
Finasteride Female Counseling: Finasteride Counseling:  I discussed with the patient the risks of use of finasteride including but not limited to decreased libido and sexual dysfunction. Explained the teratogenic nature of the medication and stressed the importance of not getting pregnant during treatment. All of the patient's questions and concerns were addressed.
Isotretinoin Pregnancy And Lactation Text: This medication is Pregnancy Category X and is considered extremely dangerous during pregnancy. It is unknown if it is excreted in breast milk.
Bimzelx Pregnancy And Lactation Text: This medication crosses the placenta and the safety is uncertain during pregnancy. It is unknown if this medication is present in breast milk.
Winlevi Counseling:  I discussed with the patient the risks of topical clascoterone including but not limited to erythema, scaling, itching, and stinging. Patient voiced their understanding.
Cibinqo Counseling: I discussed with the patient the risks of Cibinqo therapy including but not limited to common cold, nausea, headache, cold sores, increased blood CPK levels, dizziness, UTIs, fatigue, acne, and vomitting. Live vaccines should be avoided.  This medication has been linked to serious infections; higher rate of mortality; malignancy and lymphoproliferative disorders; major adverse cardiovascular events; thrombosis; thrombocytopenia and lymphopenia; lipid elevations; and retinal detachment.
Cimzia Counseling:  I discussed with the patient the risks of Cimzia including but not limited to immunosuppression, allergic reactions and infections.  The patient understands that monitoring is required including a PPD at baseline and must alert us or the primary physician if symptoms of infection or other concerning signs are noted.
High Dose Vitamin A Counseling: Side effects reviewed, pt to contact office should one occur.
Metronidazole Pregnancy And Lactation Text: This medication is Pregnancy Category B and considered safe during pregnancy.  It is also excreted in breast milk.
Clofazimine Counseling:  I discussed with the patient the risks of clofazimine including but not limited to skin and eye pigmentation, liver damage, nausea/vomiting, gastrointestinal bleeding and allergy.
Imiquimod Counseling:  I discussed with the patient the risks of imiquimod including but not limited to erythema, scaling, itching, weeping, crusting, and pain.  Patient understands that the inflammatory response to imiquimod is variable from person to person and was educated regarded proper titration schedule.  If flu-like symptoms develop, patient knows to discontinue the medication and contact us.
Oxybutynin Counseling:  I discussed with the patient the risks of oxybutynin including but not limited to skin rash, drowsiness, dry mouth, difficulty urinating, and blurred vision.
Nsaids Counseling: NSAID Counseling: I discussed with the patient that NSAIDs should be taken with food. Prolonged use of NSAIDs can result in the development of stomach ulcers.  Patient advised to stop taking NSAIDs if abdominal pain occurs.  The patient verbalized understanding of the proper use and possible adverse effects of NSAIDs.  All of the patient's questions and concerns were addressed.
Tranexamic Acid Pregnancy And Lactation Text: It is unknown if this medication is safe during pregnancy or breast feeding.
Siliq Counseling:  I discussed with the patient the risks of Siliq including but not limited to new or worsening depression, suicidal thoughts and behavior, immunosuppression, malignancy, posterior leukoencephalopathy syndrome, and serious infections.  The patient understands that monitoring is required including a PPD at baseline and must alert us or the primary physician if symptoms of infection or other concerning signs are noted. There is also a special program designed to monitor depression which is required with Siliq.
Sotyktu Pregnancy And Lactation Text: There is insufficient data to evaluate whether or not Sotyktu is safe to use during pregnancy.   It is not known if Sotyktu passes into breast milk and whether or not it is safe to use when breastfeeding.

## 2024-04-03 NOTE — PROCEDURE: ORDER TESTS
Expected Date Of Service: 04/03/2024
Performing Laboratory: 0
Billing Type: Third-Party Bill
Bill For Surgical Tray: no

## 2024-04-03 NOTE — HPI: INFECTION (ONYCHOMYCOSIS)
What Type Of Note Output Would You Prefer (Optional)?: Bullet Format
How Severe Is It?: moderate
Is This A New Presentation, Or A Follow-Up?: Nail Infection
Additional History: Last used OTC antifungal 3-4 mo ago.

## 2024-04-03 NOTE — PROCEDURE: PHOTO-DOCUMENTATION
Detail Level: Detailed
Photo Preface (Leave Blank If You Do Not Want): Photographs were obtained today
Details (Free Text): Toenails

## 2024-04-03 NOTE — PROCEDURE: NAIL CLIPPING FOR PAS
Detail Level: Detailed
Render Path Notes In Note?: No
Lab: -[id number]
Lab Facility: [id number]
Billing Type: Third-Party Bill

## 2024-04-10 ENCOUNTER — RX ONLY (OUTPATIENT)
Age: 64
Setting detail: RX ONLY
End: 2024-04-10

## 2024-04-10 RX ORDER — CICLOPIROX 80 MG/ML
SOLUTION TOPICAL
Qty: 6.6 | Refills: 11 | Status: ERX | COMMUNITY
Start: 2024-04-10

## 2024-04-24 DIAGNOSIS — F41.9 ANXIETY: ICD-10-CM

## 2024-04-24 RX ORDER — NAPROXEN 500 MG/1
500 TABLET ORAL DAILY
Qty: 90 TABLET | Refills: 1 | Status: SHIPPED | OUTPATIENT
Start: 2024-04-24

## 2024-04-24 RX ORDER — BUSPIRONE HYDROCHLORIDE 7.5 MG/1
7.5 TABLET ORAL DAILY PRN
Qty: 90 TABLET | Refills: 1 | Status: SHIPPED | OUTPATIENT
Start: 2024-04-24

## 2024-05-07 ENCOUNTER — OFFICE VISIT (OUTPATIENT)
Dept: PULMONOLOGY | Age: 64
End: 2024-05-07
Payer: COMMERCIAL

## 2024-05-07 VITALS
HEIGHT: 67 IN | OXYGEN SATURATION: 99 % | HEART RATE: 89 BPM | DIASTOLIC BLOOD PRESSURE: 78 MMHG | SYSTOLIC BLOOD PRESSURE: 122 MMHG | BODY MASS INDEX: 24.3 KG/M2 | WEIGHT: 154.8 LBS

## 2024-05-07 DIAGNOSIS — Z87.891 PERSONAL HISTORY OF TOBACCO USE: Primary | ICD-10-CM

## 2024-05-07 DIAGNOSIS — J44.9 COPD, MODERATE (HCC): ICD-10-CM

## 2024-05-07 DIAGNOSIS — K21.9 GASTROESOPHAGEAL REFLUX DISEASE, UNSPECIFIED WHETHER ESOPHAGITIS PRESENT: ICD-10-CM

## 2024-05-07 DIAGNOSIS — R91.1 PULMONARY NODULE: ICD-10-CM

## 2024-05-07 DIAGNOSIS — J43.2 CENTRILOBULAR EMPHYSEMA (HCC): ICD-10-CM

## 2024-05-07 PROCEDURE — 99214 OFFICE O/P EST MOD 30 MIN: CPT | Performed by: INTERNAL MEDICINE

## 2024-05-07 PROCEDURE — G8420 CALC BMI NORM PARAMETERS: HCPCS | Performed by: INTERNAL MEDICINE

## 2024-05-07 PROCEDURE — 3017F COLORECTAL CA SCREEN DOC REV: CPT | Performed by: INTERNAL MEDICINE

## 2024-05-07 PROCEDURE — 1036F TOBACCO NON-USER: CPT | Performed by: INTERNAL MEDICINE

## 2024-05-07 PROCEDURE — 3074F SYST BP LT 130 MM HG: CPT | Performed by: INTERNAL MEDICINE

## 2024-05-07 PROCEDURE — G8427 DOCREV CUR MEDS BY ELIG CLIN: HCPCS | Performed by: INTERNAL MEDICINE

## 2024-05-07 PROCEDURE — G0296 VISIT TO DETERM LDCT ELIG: HCPCS | Performed by: INTERNAL MEDICINE

## 2024-05-07 PROCEDURE — 3078F DIAST BP <80 MM HG: CPT | Performed by: INTERNAL MEDICINE

## 2024-05-07 NOTE — PROGRESS NOTES
[facility]  Consult Note  Anibal Ashraf MD       Sayda Dickens   YOB: 1960    Date of Visit:  5/7/2024    Assessment/Plan:  1. Centrilobular emphysema (HCC)/left lower lobe pneumonia  I reviewed CT imaging with the patient for the visit today.  CT scan does reveal evidence of paraseptal and centrilobular emphysema mild to moderate in severity.    2. COPD, moderate (HCC)  PFT 2020:  Spirometry reveals normal FVC.  FEV1 is reduced at 2 liters, which is  70% predicted.  FEV1/FVC ratio is reduced at 67%.  There is 200 mL  improvement in FEV1 after inhaled bronchodilators, but this is only 10%  improvement.  Expiratory flow rates are also reduced.     IMPRESSION:  Moderate obstructive lung disease with a trend toward  improvement after inhaled bronchodilators.    Spiriva ==> makes her have a sore throat despite rinsing after use.    Have her repeat PFTs ==> She never had this done    3. Hemoptysis  No Hemoptysis and cough has rnearly resolved.,    4. Pulmonary nodule  No worrisome nodules  Repeat CT in November as a screening study    5. Gastroesophageal reflux disease, unspecified whether esophagitis present  Infrequent symptoms  She treats with over-the-counter antacids and PPI    6. Former smoker  Significant tobacco smoke exposure  Quit smoking in 2017    She had her COVID and Flu shots  Recommend RSV as well    6 months      Chief Complaint   Patient presents with   • COPD   • Centrilobular emphysema   • Cough   • Head Congestion   • Allergies       HPI  The patient presents with a chief complaint of moderate shortness of breath related to moderate COPD of many years duration. He has mild associated cough. Exertion is a modifying factor.    She had pneumonia in the fall and that has cleared up. She has some springtime allergies but otherwise breathing has been baseline.    Review of Systems  No Chest pain, Nausea or vomiting reported      History  I

## 2024-05-08 DIAGNOSIS — I73.00 RAYNAUD'S PHENOMENON WITHOUT GANGRENE: ICD-10-CM

## 2024-05-08 DIAGNOSIS — I10 ESSENTIAL HYPERTENSION: ICD-10-CM

## 2024-05-08 RX ORDER — NIFEDIPINE 30 MG/1
30 TABLET, EXTENDED RELEASE ORAL NIGHTLY
Qty: 90 TABLET | Refills: 1 | Status: SHIPPED | OUTPATIENT
Start: 2024-05-08

## 2024-05-09 ENCOUNTER — TELEPHONE (OUTPATIENT)
Dept: PULMONOLOGY | Age: 64
End: 2024-05-09

## 2024-05-14 ENCOUNTER — OFFICE VISIT (OUTPATIENT)
Dept: INTERNAL MEDICINE CLINIC | Age: 64
End: 2024-05-14
Payer: COMMERCIAL

## 2024-05-14 VITALS
WEIGHT: 150 LBS | DIASTOLIC BLOOD PRESSURE: 76 MMHG | HEART RATE: 57 BPM | BODY MASS INDEX: 23.49 KG/M2 | SYSTOLIC BLOOD PRESSURE: 124 MMHG | OXYGEN SATURATION: 98 %

## 2024-05-14 DIAGNOSIS — F10.21 ALCOHOL DEPENDENCE IN REMISSION (HCC): ICD-10-CM

## 2024-05-14 DIAGNOSIS — E78.5 HYPERLIPIDEMIA, UNSPECIFIED HYPERLIPIDEMIA TYPE: ICD-10-CM

## 2024-05-14 DIAGNOSIS — M62.830 BACK SPASM: ICD-10-CM

## 2024-05-14 DIAGNOSIS — I10 ESSENTIAL HYPERTENSION: Primary | ICD-10-CM

## 2024-05-14 DIAGNOSIS — F41.9 ANXIETY: ICD-10-CM

## 2024-05-14 PROCEDURE — 99214 OFFICE O/P EST MOD 30 MIN: CPT | Performed by: INTERNAL MEDICINE

## 2024-05-14 PROCEDURE — G8420 CALC BMI NORM PARAMETERS: HCPCS | Performed by: INTERNAL MEDICINE

## 2024-05-14 PROCEDURE — 3017F COLORECTAL CA SCREEN DOC REV: CPT | Performed by: INTERNAL MEDICINE

## 2024-05-14 PROCEDURE — G8427 DOCREV CUR MEDS BY ELIG CLIN: HCPCS | Performed by: INTERNAL MEDICINE

## 2024-05-14 PROCEDURE — 3078F DIAST BP <80 MM HG: CPT | Performed by: INTERNAL MEDICINE

## 2024-05-14 PROCEDURE — 1036F TOBACCO NON-USER: CPT | Performed by: INTERNAL MEDICINE

## 2024-05-14 PROCEDURE — 3074F SYST BP LT 130 MM HG: CPT | Performed by: INTERNAL MEDICINE

## 2024-05-14 SDOH — ECONOMIC STABILITY: FOOD INSECURITY: WITHIN THE PAST 12 MONTHS, THE FOOD YOU BOUGHT JUST DIDN'T LAST AND YOU DIDN'T HAVE MONEY TO GET MORE.: NEVER TRUE

## 2024-05-14 SDOH — ECONOMIC STABILITY: INCOME INSECURITY: HOW HARD IS IT FOR YOU TO PAY FOR THE VERY BASICS LIKE FOOD, HOUSING, MEDICAL CARE, AND HEATING?: NOT VERY HARD

## 2024-05-14 SDOH — ECONOMIC STABILITY: FOOD INSECURITY: WITHIN THE PAST 12 MONTHS, YOU WORRIED THAT YOUR FOOD WOULD RUN OUT BEFORE YOU GOT MONEY TO BUY MORE.: NEVER TRUE

## 2024-05-14 ASSESSMENT — ENCOUNTER SYMPTOMS
WHEEZING: 0
TROUBLE SWALLOWING: 0
VOMITING: 0
ABDOMINAL PAIN: 0
NAUSEA: 0
SHORTNESS OF BREATH: 0
VOICE CHANGE: 0

## 2024-05-14 NOTE — PROGRESS NOTES
(SINGULAIR) 10 MG tablet TAKE 1 TABLET BY MOUTH DAILY 90 tablet 1    omeprazole (PRILOSEC) 40 MG delayed release capsule Take 1 capsule by mouth every morning (before breakfast) 90 capsule 1    clotrimazole (LOTRIMIN) 1 % cream APPLY TOPICALLY  TWO TIMES A DAY FOR 4 WEEKS 60 g 0    fluocinonide (LIDEX) 0.05 % cream Apply topically 2 times daily. 30 g 1     No current facility-administered medications for this visit.       Return in about 3 months (around 8/14/2024).  An After Visit Summary was printed and given to the patient.  Documentation was done using voice recognition dragon software.  Every effort was made to ensure accuracy; however, inadvertent  Unintentional computerized transcription errors may be present.

## 2024-06-06 DIAGNOSIS — E87.1 HYPONATREMIA: ICD-10-CM

## 2024-06-06 LAB
ALBUMIN SERPL-MCNC: 4.9 G/DL (ref 3.4–5)
ANION GAP SERPL CALCULATED.3IONS-SCNC: 14 MMOL/L (ref 3–16)
BUN SERPL-MCNC: 10 MG/DL (ref 7–20)
CALCIUM SERPL-MCNC: 10.2 MG/DL (ref 8.3–10.6)
CHLORIDE SERPL-SCNC: 94 MMOL/L (ref 99–110)
CO2 SERPL-SCNC: 24 MMOL/L (ref 21–32)
CREAT SERPL-MCNC: 0.6 MG/DL (ref 0.6–1.2)
DEPRECATED RDW RBC AUTO: 13.3 % (ref 12.4–15.4)
GFR SERPLBLD CREATININE-BSD FMLA CKD-EPI: >90 ML/MIN/{1.73_M2}
GLUCOSE SERPL-MCNC: 98 MG/DL (ref 70–99)
HCT VFR BLD AUTO: 39.7 % (ref 36–48)
HGB BLD-MCNC: 13.9 G/DL (ref 12–16)
MCH RBC QN AUTO: 33.5 PG (ref 26–34)
MCHC RBC AUTO-ENTMCNC: 35 G/DL (ref 31–36)
MCV RBC AUTO: 95.8 FL (ref 80–100)
PHOSPHATE SERPL-MCNC: 3.9 MG/DL (ref 2.5–4.9)
PLATELET # BLD AUTO: 200 K/UL (ref 135–450)
PMV BLD AUTO: 8.4 FL (ref 5–10.5)
POTASSIUM SERPL-SCNC: 4.2 MMOL/L (ref 3.5–5.1)
RBC # BLD AUTO: 4.14 M/UL (ref 4–5.2)
SODIUM SERPL-SCNC: 132 MMOL/L (ref 136–145)
WBC # BLD AUTO: 6.3 K/UL (ref 4–11)

## 2024-06-08 DIAGNOSIS — R05.8 RECURRENT COUGH: ICD-10-CM

## 2024-06-08 DIAGNOSIS — J30.9 CHRONIC ALLERGIC RHINITIS: ICD-10-CM

## 2024-06-10 RX ORDER — MONTELUKAST SODIUM 10 MG/1
TABLET ORAL
Qty: 90 TABLET | Refills: 1 | Status: SHIPPED | OUTPATIENT
Start: 2024-06-10

## 2024-06-10 NOTE — TELEPHONE ENCOUNTER
Medication:   Requested Prescriptions     Pending Prescriptions Disp Refills    montelukast (SINGULAIR) 10 MG tablet [Pharmacy Med Name: MONTELUKAST SOD 10 MG TABLET] 90 tablet 1     Sig: TAKE 1 TABLET BY MOUTH DAILY        Last Filled:  12/5/23    Patient Phone Number: [phone] (home)     Last appt: 5/14/2024   Next appt: 8/13/2024    Last OARRS:       4/19/2017     4:01 PM   RX Monitoring   Attestation The Prescription Monitoring Report for this patient was reviewed today.

## 2024-06-13 DIAGNOSIS — R05.8 RECURRENT COUGH: ICD-10-CM

## 2024-06-13 DIAGNOSIS — J30.9 CHRONIC ALLERGIC RHINITIS: ICD-10-CM

## 2024-06-13 RX ORDER — MONTELUKAST SODIUM 10 MG/1
TABLET ORAL
Qty: 90 TABLET | Refills: 1 | OUTPATIENT
Start: 2024-06-13

## 2024-06-19 DIAGNOSIS — J30.9 CHRONIC ALLERGIC RHINITIS: ICD-10-CM

## 2024-06-19 RX ORDER — FLUTICASONE PROPIONATE 50 MCG
SPRAY, SUSPENSION (ML) NASAL
Qty: 1 EACH | Refills: 1 | Status: SHIPPED | OUTPATIENT
Start: 2024-06-19

## 2024-07-03 DIAGNOSIS — B35.3 TINEA PEDIS OF BOTH FEET: ICD-10-CM

## 2024-07-03 RX ORDER — CLOTRIMAZOLE 1 %
CREAM (GRAM) TOPICAL
Qty: 60 G | Refills: 0 | OUTPATIENT
Start: 2024-07-03

## 2024-07-12 ENCOUNTER — TELEPHONE (OUTPATIENT)
Dept: VASCULAR SURGERY | Age: 64
End: 2024-07-12

## 2024-07-12 DIAGNOSIS — I65.23 CAROTID ATHEROSCLEROSIS, BILATERAL: Primary | ICD-10-CM

## 2024-07-18 DIAGNOSIS — M62.838 NECK MUSCLE SPASM: ICD-10-CM

## 2024-07-18 DIAGNOSIS — M62.830 BACK SPASM: ICD-10-CM

## 2024-07-18 RX ORDER — TIZANIDINE 2 MG/1
TABLET ORAL
Qty: 90 TABLET | Refills: 1 | Status: SHIPPED | OUTPATIENT
Start: 2024-07-18

## 2024-07-23 DIAGNOSIS — J31.0 CHRONIC RHINITIS: ICD-10-CM

## 2024-07-23 RX ORDER — IPRATROPIUM BROMIDE 42 UG/1
SPRAY, METERED NASAL
Qty: 15 ML | Refills: 0 | Status: SHIPPED | OUTPATIENT
Start: 2024-07-23

## 2024-07-23 NOTE — TELEPHONE ENCOUNTER
Last OV: 5/14/2024  Next OV: 8/13/2024    Next appointment due:8/14/2024     Last fill:2/15/24  Refills:3

## 2024-08-02 RX ORDER — LANOLIN ALCOHOL/MO/W.PET/CERES
100 CREAM (GRAM) TOPICAL DAILY
Qty: 90 TABLET | Refills: 1 | Status: SHIPPED | OUTPATIENT
Start: 2024-08-02

## 2024-08-05 DIAGNOSIS — I49.9 IRREGULAR HEART BEAT: ICD-10-CM

## 2024-08-05 DIAGNOSIS — I10 ESSENTIAL HYPERTENSION: ICD-10-CM

## 2024-08-05 RX ORDER — METOPROLOL SUCCINATE 50 MG/1
TABLET, EXTENDED RELEASE ORAL
Qty: 90 TABLET | Refills: 1 | Status: SHIPPED | OUTPATIENT
Start: 2024-08-05

## 2024-08-07 DIAGNOSIS — E78.5 HYPERLIPIDEMIA, UNSPECIFIED HYPERLIPIDEMIA TYPE: ICD-10-CM

## 2024-08-07 LAB
CHOLEST SERPL-MCNC: 159 MG/DL (ref 0–199)
HDLC SERPL-MCNC: 80 MG/DL (ref 40–60)
LDL CHOLESTEROL: 69 MG/DL
TRIGL SERPL-MCNC: 52 MG/DL (ref 0–150)
VLDLC SERPL CALC-MCNC: 10 MG/DL

## 2024-08-10 DIAGNOSIS — I65.23 BILATERAL CAROTID ARTERY STENOSIS: ICD-10-CM

## 2024-08-12 RX ORDER — ATORVASTATIN CALCIUM 20 MG/1
20 TABLET, FILM COATED ORAL DAILY
Qty: 90 TABLET | Refills: 1 | Status: SHIPPED | OUTPATIENT
Start: 2024-08-12

## 2024-08-12 NOTE — TELEPHONE ENCOUNTER
Medication:   Requested Prescriptions     Pending Prescriptions Disp Refills    atorvastatin (LIPITOR) 20 MG tablet [Pharmacy Med Name: ATORVASTATIN 20 MG TABLET] 90 tablet 1     Sig: TAKE 1 TABLET BY MOUTH DAILY        Last Filled:  2/9/24    Patient Phone Number: [phone] (home)     Last appt: 5/14/2024   Next appt: 8/13/2024    Last OARRS:       4/19/2017     4:01 PM   RX Monitoring   Attestation The Prescription Monitoring Report for this patient was reviewed today.

## 2024-08-13 ENCOUNTER — OFFICE VISIT (OUTPATIENT)
Dept: INTERNAL MEDICINE CLINIC | Age: 64
End: 2024-08-13
Payer: COMMERCIAL

## 2024-08-13 VITALS
OXYGEN SATURATION: 94 % | TEMPERATURE: 97.2 F | WEIGHT: 148.6 LBS | BODY MASS INDEX: 23.27 KG/M2 | HEART RATE: 74 BPM | SYSTOLIC BLOOD PRESSURE: 120 MMHG | DIASTOLIC BLOOD PRESSURE: 70 MMHG

## 2024-08-13 DIAGNOSIS — M62.838 NECK MUSCLE SPASM: ICD-10-CM

## 2024-08-13 DIAGNOSIS — E78.5 HYPERLIPIDEMIA, UNSPECIFIED HYPERLIPIDEMIA TYPE: ICD-10-CM

## 2024-08-13 DIAGNOSIS — B35.3 TINEA PEDIS OF BOTH FEET: ICD-10-CM

## 2024-08-13 DIAGNOSIS — F41.9 ANXIETY: ICD-10-CM

## 2024-08-13 DIAGNOSIS — M62.830 BACK SPASM: ICD-10-CM

## 2024-08-13 DIAGNOSIS — I10 ESSENTIAL HYPERTENSION: Primary | ICD-10-CM

## 2024-08-13 PROCEDURE — 3017F COLORECTAL CA SCREEN DOC REV: CPT | Performed by: INTERNAL MEDICINE

## 2024-08-13 PROCEDURE — 1036F TOBACCO NON-USER: CPT | Performed by: INTERNAL MEDICINE

## 2024-08-13 PROCEDURE — 3078F DIAST BP <80 MM HG: CPT | Performed by: INTERNAL MEDICINE

## 2024-08-13 PROCEDURE — G8427 DOCREV CUR MEDS BY ELIG CLIN: HCPCS | Performed by: INTERNAL MEDICINE

## 2024-08-13 PROCEDURE — 99214 OFFICE O/P EST MOD 30 MIN: CPT | Performed by: INTERNAL MEDICINE

## 2024-08-13 PROCEDURE — G2211 COMPLEX E/M VISIT ADD ON: HCPCS | Performed by: INTERNAL MEDICINE

## 2024-08-13 PROCEDURE — G8420 CALC BMI NORM PARAMETERS: HCPCS | Performed by: INTERNAL MEDICINE

## 2024-08-13 PROCEDURE — 3074F SYST BP LT 130 MM HG: CPT | Performed by: INTERNAL MEDICINE

## 2024-08-13 RX ORDER — CLOTRIMAZOLE 1 %
CREAM (GRAM) TOPICAL
Qty: 85 G | Refills: 2 | Status: SHIPPED | OUTPATIENT
Start: 2024-08-13

## 2024-08-13 ASSESSMENT — ENCOUNTER SYMPTOMS
ABDOMINAL PAIN: 0
SHORTNESS OF BREATH: 0
TROUBLE SWALLOWING: 0
VOMITING: 0
WHEEZING: 0
NAUSEA: 0
PHOTOPHOBIA: 0
VOICE CHANGE: 0

## 2024-08-13 NOTE — PROGRESS NOTES
7.5 MG tablet TAKE 1 TABLET BY MOUTH DAILY AS NEEDED FOR ANXIETY 90 tablet 1    sodium chloride 1 g tablet Take 1 tablet by mouth in the morning and at bedtime 180 tablet 2    folic acid (FOLVITE) 1 MG tablet TAKE 1 TABLET BY MOUTH DAILY 90 tablet 1    escitalopram (LEXAPRO) 20 MG tablet TAKE 1 TABLET BY MOUTH DAILY 90 tablet 1    VENTOLIN  (90 Base) MCG/ACT inhaler INHALE TWO PUFFS BY MOUTH EVERY 6 HOURS AS NEEDED FOR WHEEZING 18 g 4    ascorbic acid (VITAMIN C) 500 MG tablet TAKE 1 TABLET BY MOUTH DAILY 90 tablet 1    omeprazole (PRILOSEC) 40 MG delayed release capsule Take 1 capsule by mouth every morning (before breakfast) 90 capsule 1    fluocinonide (LIDEX) 0.05 % cream Apply topically 2 times daily. 30 g 1     No current facility-administered medications for this visit.       Return in about 6 months (around 2/13/2025) for as schedule.  An After Visit Summary was printed and given to the patient.  Documentation was done using voice recognition dragon software.  Every effort was made to ensure accuracy; however, inadvertent  Unintentional computerized transcription errors may be present.

## 2024-08-15 ENCOUNTER — TELEPHONE (OUTPATIENT)
Dept: INTERNAL MEDICINE CLINIC | Age: 64
End: 2024-08-15

## 2024-08-15 DIAGNOSIS — B07.9 WARTS OF FOOT: ICD-10-CM

## 2024-08-15 DIAGNOSIS — L98.9 SKIN LESION OF FOOT: Primary | ICD-10-CM

## 2024-08-15 NOTE — TELEPHONE ENCOUNTER
Pt calling, forgot to discuss plantars wart on bottom of foot during appt on 8/13. Pt states she has had it for about 5 months and the pain keeps increasing. Tried freeze off but it did not help. Advised pt she may need to come in for another appt for Dr. Salvador to evaluate, pt understands. Please advise and call pt.

## 2024-08-15 NOTE — TELEPHONE ENCOUNTER
Call placed to patient to informed PCP recommended seeing a podiatry and order was placed. Information to Podiatry given to patient.

## 2024-09-08 DIAGNOSIS — J30.9 CHRONIC ALLERGIC RHINITIS: ICD-10-CM

## 2024-09-09 RX ORDER — FLUTICASONE PROPIONATE 50 MCG
SPRAY, SUSPENSION (ML) NASAL
Qty: 3 EACH | Refills: 3 | Status: SHIPPED | OUTPATIENT
Start: 2024-09-09

## 2024-09-11 DIAGNOSIS — F41.9 ANXIETY: ICD-10-CM

## 2024-09-12 RX ORDER — ESCITALOPRAM OXALATE 20 MG/1
TABLET ORAL
Qty: 90 TABLET | Refills: 1 | Status: SHIPPED | OUTPATIENT
Start: 2024-09-12

## 2024-09-23 DIAGNOSIS — F41.9 ANXIETY: ICD-10-CM

## 2024-09-23 RX ORDER — BUSPIRONE HYDROCHLORIDE 7.5 MG/1
7.5 TABLET ORAL DAILY PRN
Qty: 90 TABLET | Refills: 1 | Status: SHIPPED | OUTPATIENT
Start: 2024-09-23

## 2024-09-23 RX ORDER — FOLIC ACID 1 MG/1
1000 TABLET ORAL DAILY
Qty: 90 TABLET | Refills: 1 | Status: SHIPPED | OUTPATIENT
Start: 2024-09-23

## 2024-09-25 DIAGNOSIS — T14.8XXA BRUISE: ICD-10-CM

## 2024-09-25 RX ORDER — ASCORBIC ACID 500 MG
500 TABLET ORAL DAILY
Qty: 90 TABLET | Refills: 1 | Status: SHIPPED | OUTPATIENT
Start: 2024-09-25

## 2024-11-01 DIAGNOSIS — I73.00 RAYNAUD'S PHENOMENON WITHOUT GANGRENE: ICD-10-CM

## 2024-11-01 DIAGNOSIS — I10 ESSENTIAL HYPERTENSION: ICD-10-CM

## 2024-11-01 RX ORDER — NIFEDIPINE 30 MG/1
30 TABLET, EXTENDED RELEASE ORAL NIGHTLY
Qty: 90 TABLET | Refills: 1 | Status: SHIPPED | OUTPATIENT
Start: 2024-11-01

## 2024-11-12 ENCOUNTER — OFFICE VISIT (OUTPATIENT)
Dept: ORTHOPEDIC SURGERY | Age: 64
End: 2024-11-12

## 2024-11-12 VITALS — HEIGHT: 67 IN | WEIGHT: 148 LBS | BODY MASS INDEX: 23.23 KG/M2

## 2024-11-12 DIAGNOSIS — S43.431A SUPERIOR GLENOID LABRUM LESION OF RIGHT SHOULDER, INITIAL ENCOUNTER: ICD-10-CM

## 2024-11-12 DIAGNOSIS — M25.511 RIGHT SHOULDER PAIN, UNSPECIFIED CHRONICITY: Primary | ICD-10-CM

## 2024-11-12 RX ORDER — TRIAMCINOLONE ACETONIDE 40 MG/ML
40 INJECTION, SUSPENSION INTRA-ARTICULAR; INTRAMUSCULAR ONCE
Status: COMPLETED | OUTPATIENT
Start: 2024-11-12 | End: 2024-11-12

## 2024-11-12 RX ORDER — BUPIVACAINE HYDROCHLORIDE 2.5 MG/ML
4 INJECTION, SOLUTION INFILTRATION; PERINEURAL ONCE
Status: COMPLETED | OUTPATIENT
Start: 2024-11-12 | End: 2024-11-12

## 2024-11-12 RX ORDER — LIDOCAINE HYDROCHLORIDE 10 MG/ML
4 INJECTION, SOLUTION INFILTRATION; PERINEURAL ONCE
Status: COMPLETED | OUTPATIENT
Start: 2024-11-12 | End: 2024-11-12

## 2024-11-12 RX ADMIN — LIDOCAINE HYDROCHLORIDE 4 ML: 10 INJECTION, SOLUTION INFILTRATION; PERINEURAL at 08:37

## 2024-11-12 RX ADMIN — BUPIVACAINE HYDROCHLORIDE 10 MG: 2.5 INJECTION, SOLUTION INFILTRATION; PERINEURAL at 08:36

## 2024-11-12 RX ADMIN — TRIAMCINOLONE ACETONIDE 40 MG: 40 INJECTION, SUSPENSION INTRA-ARTICULAR; INTRAMUSCULAR at 08:37

## 2024-11-12 NOTE — PROGRESS NOTES
CHIEF COMPLAINT: Right shoulder pain    History:    Sayda Dickens is a 63 y.o. right handed female self-referred for evaluation and treatment of Right shoulder pain.   This is evaluated as a personal injury.   The pain began 6 months ago.  Pain is rated as a 7/10.   There was not an injury.  Pain did increase last week as she was scrubbing lu and then the next day woke up and really had difficulty moving her arm.  She points to pain being located deep within her shoulder.  Symptoms are worse with reaching up overhead, laying on her side, and reaching behind her back.  The patient has not had PT. The patient has not had an injection.   The patient has tried NSAIDs, naproxen with relief. The patient has tried ice, with relief.   Patient's occupation is at [employer]      Past Medical History:   Diagnosis Date    Alcohol problem drinking     Alcohol withdrawal delirium (HCC) 2/22/2017    Anxiety     Facial bones, closed fracture 2/22/2017    Hypertension     Hyponatremia     Malignant hypertension 3/17/2016    Panic attacks        Past Surgical History:   Procedure Laterality Date    COLONOSCOPY  11/16/2018    next colonoscopy in 2028, 10 years    COLONOSCOPY N/A 11/16/2018    COLONOSCOPY POLYPECTOMY SNARE/COLD BIOPSY performed by Jin Florian MD at [facility] ENDOSCOPY    NECK SURGERY  2000    OTHER SURGICAL HISTORY      Right carotid artery surgery    TOTAL HIP ARTHROPLASTY Left 3/28/2023    LEFT TOTAL HIP REPLACEMENT-DIRECT ANTERIOR; MIRZA performed by Andrea Newsome MD at [facility] OR    TUBAL LIGATION      WRIST GANGLION EXCISION  1988    bilateral [facility], [place]       Current Outpatient Medications on File Prior to Visit   Medication Sig Dispense Refill    NIFEdipine (PROCARDIA XL) 30 MG extended release tablet TAKE 1 TABLET BY MOUTH AT BEDTIME 90 tablet 1    sodium chloride 1 g tablet Take 1 tablet by mouth in the morning and at bedtime 180 tablet 2    vitamin C (ASCORBIC ACID) 500 MG tablet TAKE

## 2024-11-14 ENCOUNTER — TELEPHONE (OUTPATIENT)
Dept: VASCULAR SURGERY | Age: 64
End: 2024-11-14

## 2024-11-14 NOTE — TELEPHONE ENCOUNTER
Pt called to let us know that her insurance is expiring 11/30. She is attempting to get new insurance, but may not be able to get the new coverage and schedule her CDS right when it is due. I offered to get her scheduled at  or [place] in the office before her insurance expires, but pt declined d/t location.

## 2024-11-16 ENCOUNTER — HOSPITAL ENCOUNTER (OUTPATIENT)
Dept: WOMENS IMAGING | Age: 64
Discharge: HOME | End: 2024-11-16
Payer: COMMERCIAL

## 2024-11-16 VITALS — WEIGHT: 147 LBS | HEIGHT: 67 IN | BODY MASS INDEX: 23.07 KG/M2

## 2024-11-16 DIAGNOSIS — Z12.31 SCREENING MAMMOGRAM FOR BREAST CANCER: ICD-10-CM

## 2024-11-16 PROCEDURE — 77063 BREAST TOMOSYNTHESIS BI: CPT

## 2024-11-20 ENCOUNTER — HOSPITAL ENCOUNTER (OUTPATIENT)
Dept: PHYSICAL THERAPY | Age: 64
Setting detail: THERAPIES SERIES
Discharge: HOME | End: 2024-11-20
Attending: ORTHOPAEDIC SURGERY
Payer: COMMERCIAL

## 2024-11-20 DIAGNOSIS — M25.511 RIGHT SHOULDER PAIN, UNSPECIFIED CHRONICITY: ICD-10-CM

## 2024-11-20 DIAGNOSIS — S43.431S SUPERIOR GLENOID LABRUM LESION OF RIGHT SHOULDER, SEQUELA: Primary | ICD-10-CM

## 2024-11-20 PROCEDURE — 97161 PT EVAL LOW COMPLEX 20 MIN: CPT

## 2024-11-20 PROCEDURE — 97530 THERAPEUTIC ACTIVITIES: CPT

## 2024-11-20 PROCEDURE — 97140 MANUAL THERAPY 1/> REGIONS: CPT

## 2024-11-20 NOTE — PLAN OF CARE
[facility] - [facility] [address], [place] [address] office: [phone] fax: [phone]     Physical Therapy Initial Evaluation Certification      Dear Leon Monroy MD ,    We had the pleasure of evaluating the following patient for physical therapy services at [facility].  A summary of our findings can be found in the initial assessment below.  This includes our plan of care.  If you have any questions or concerns regarding these findings, please do not hesitate to contact me at the office phone number listed above.  Thank you for the referral.     Physician Signature:_______________________________Date:__________________  By signing above (or electronic signature), therapist’s plan is approved by physician       Physical Therapy: TREATMENT/PROGRESS NOTE   Patient: Sayda Dickens (63 y.o. female)   Examination Date: 2024   :  1960 MRN: [id number]   Visit #: 1   Insurance Allowable Auth Needed   30 - insurance ending on  []Yes    [x]No    Insurance: Payor: [employer] / Plan: [employer] [place] MEDICAID / Product Type: *No Product type* /   Insurance ID: [id number] - (Medicaid Managed)  Secondary Insurance (if applicable):    Treatment Diagnosis:     ICD-10-CM    1. Superior glenoid labrum lesion of right shoulder, sequela  S43.431S       2. Right shoulder pain, unspecified chronicity  M25.511          Medical Diagnosis:  Superior glenoid labrum lesion of right shoulder, initial encounter [S43.431A]   Referring Physician: Leon Monroy MD  PCP: RYLAND Salvador MD     Plan of care signed (Y/N):     Date of Patient follow up with Physician:      Plan of Care Report: EVAL today  POC update due: (10 visits /OR AUTH LIMITS, whichever is less)  2024                                             Medical History:  Comorbidities:  Hypertension  Rheumatoid Arthritis  Anxiety  Depression  Relevant Medical

## 2024-11-22 ENCOUNTER — OFFICE VISIT (OUTPATIENT)
Dept: INTERNAL MEDICINE CLINIC | Age: 64
End: 2024-11-22

## 2024-11-22 ENCOUNTER — APPOINTMENT (OUTPATIENT)
Dept: PHYSICAL THERAPY | Age: 64
End: 2024-11-22
Attending: ORTHOPAEDIC SURGERY
Payer: COMMERCIAL

## 2024-11-22 VITALS
SYSTOLIC BLOOD PRESSURE: 124 MMHG | DIASTOLIC BLOOD PRESSURE: 74 MMHG | BODY MASS INDEX: 23.35 KG/M2 | HEIGHT: 67 IN | HEART RATE: 73 BPM | WEIGHT: 148.8 LBS | OXYGEN SATURATION: 97 %

## 2024-11-22 DIAGNOSIS — F43.22 ADJUSTMENT DISORDER WITH ANXIOUS MOOD: Primary | ICD-10-CM

## 2024-11-22 DIAGNOSIS — F41.9 ANXIETY: ICD-10-CM

## 2024-11-22 DIAGNOSIS — M62.830 BACK SPASM: ICD-10-CM

## 2024-11-22 DIAGNOSIS — I10 ESSENTIAL HYPERTENSION: ICD-10-CM

## 2024-11-22 DIAGNOSIS — M62.838 NECK MUSCLE SPASM: ICD-10-CM

## 2024-11-22 DIAGNOSIS — I49.9 IRREGULAR HEART BEAT: ICD-10-CM

## 2024-11-22 RX ORDER — BUSPIRONE HYDROCHLORIDE 7.5 MG/1
7.5 TABLET ORAL 2 TIMES DAILY
Qty: 180 TABLET | Refills: 1 | Status: SHIPPED | OUTPATIENT
Start: 2024-11-22

## 2024-11-22 NOTE — PROGRESS NOTES
64 08/17/2010 09:20 AM    LDL 69 08/07/2024 08:48 AM    LDL 76 05/15/2023 09:44 AM     TSH:   Lab Results   Component Value Date/Time    TSH 1.18 03/03/2023 09:17 AM         ASSESSMENT/PLAN:  Assessment/Plan:  Sayda was seen today for 6 month follow-up.    Diagnoses and all orders for this visit:  History of adjustment disorder with anxious mood  Anxiety  And depression.  Patient report BuSpar 7.5 mg twice a day helped tremendously however it was reduced to once a day.  Patient denies any suicidal homicidal ideation.  Denies manic symptoms.  Denies excessive daytime sleepiness or drowsiness.  She feels her anxiety is stress also under control with current citalopram.  Will reorder-     busPIRone (BUSPAR) 7.5 MG tablet; Take 1 tablet by mouth 2 times daily    Essential hypertension  Patient denies any exertional chest pain, dyspnea, palpitations, syncope, orthopnea, edema or paroxysmal nocturnal dyspnea.    Excellent blood pressure control with current metoprolol and nifedipine  Irregular heart beat  Pulse is regular.  No palpitation dizziness.  She also follows up with cardiologist periodically currently on nifedipine and metoprolol  Back spasm  Neck muscle spasm  Patient is still take occasional tizanidine for muscle spasm and her symptom has been in good remission.    Status post right shoulder injury.  Patient will be doing physical therapy already evaluated by orthopedic physician.  Patient continued to complain of significant restriction in movement of the right shoulder          No orders of the defined types were placed in this encounter.    Current Outpatient Medications   Medication Sig Dispense Refill    busPIRone (BUSPAR) 7.5 MG tablet Take 1 tablet by mouth 2 times daily 180 tablet 1    NIFEdipine (PROCARDIA XL) 30 MG extended release tablet TAKE 1 TABLET BY MOUTH AT BEDTIME 90 tablet 1    sodium chloride 1 g tablet Take 1 tablet by mouth in the morning and at bedtime 180 tablet 2    vitamin C

## 2024-11-26 ENCOUNTER — HOSPITAL ENCOUNTER (OUTPATIENT)
Dept: PHYSICAL THERAPY | Age: 64
Setting detail: THERAPIES SERIES
Discharge: HOME | End: 2024-11-26
Attending: ORTHOPAEDIC SURGERY
Payer: COMMERCIAL

## 2024-11-26 PROCEDURE — 97110 THERAPEUTIC EXERCISES: CPT

## 2024-11-26 PROCEDURE — 97140 MANUAL THERAPY 1/> REGIONS: CPT

## 2024-11-26 NOTE — FLOWSHEET NOTE
[facility] - Outpatient Rehabilitation [facility] Therapy [address], [place] [address] office: [phone] fax: [phone]           Physical Therapy: TREATMENT/PROGRESS NOTE   Patient: Sayda Dickens (64 y.o. female)   Examination Date: 2024   :  1960 MRN: [id number]   Visit #: 2   Insurance Allowable Auth Needed   30 - insurance ending on  []Yes    [x]No    Insurance: Payor: [employer] / Plan: [employer] [place] MEDICAID / Product Type: *No Product type* /   Insurance ID: [id number] - (Medicaid Managed)  Secondary Insurance (if applicable):    Treatment Diagnosis:     ICD-10-CM    1. Superior glenoid labrum lesion of right shoulder, sequela  S43.431S       2. Right shoulder pain, unspecified chronicity  M25.511          Medical Diagnosis:  Superior glenoid labrum lesion of right shoulder, initial encounter [S43.431A]   Referring Physician: Leon Monroy MD  PCP: RYLAND Salvador MD     Plan of care signed (Y/N):     Date of Patient follow up with Physician:      Plan of Care Report: NO  POC update due: (10 visits /OR AUTH LIMITS, whichever is less)  2024                                             Medical History:  Comorbidities:  Hypertension  Rheumatoid Arthritis  Anxiety  Depression  Relevant Medical History:                                          Precautions/ Contra-indications:           Latex allergy:  NO  Pacemaker:    NO  Contraindications for Manipulation: None  Date of Surgery:   Other:    Red Flags:  None    Suicide Screening:   The patient did not verbalize a primary behavioral concern, suicidal ideation, suicidal intent, or demonstrate suicidal behaviors.    Preferred Language for Healthcare:   [x] English       [] other:    SUBJECTIVE EXAMINATION     Patient stated complaint: R shoulder pain that started in September. Was very mild initially, then was reaching in the backseat for her coat when she had an onset of sharp pain. Since then, her

## 2024-11-29 ENCOUNTER — APPOINTMENT (OUTPATIENT)
Dept: PHYSICAL THERAPY | Age: 64
End: 2024-11-29
Attending: ORTHOPAEDIC SURGERY
Payer: COMMERCIAL

## 2024-11-30 ENCOUNTER — HOSPITAL ENCOUNTER (OUTPATIENT)
Dept: PHYSICAL THERAPY | Age: 64
Setting detail: THERAPIES SERIES
End: 2024-11-30
Attending: ORTHOPAEDIC SURGERY
Payer: COMMERCIAL

## 2025-01-14 DIAGNOSIS — M62.830 BACK SPASM: ICD-10-CM

## 2025-01-14 DIAGNOSIS — M62.838 NECK MUSCLE SPASM: ICD-10-CM

## 2025-01-14 RX ORDER — TIZANIDINE 2 MG/1
TABLET ORAL
Qty: 90 TABLET | Refills: 1 | Status: SHIPPED | OUTPATIENT
Start: 2025-01-14

## 2025-01-14 NOTE — TELEPHONE ENCOUNTER
Last OV: 11/22/2024  Next OV: 2/25/2025    Next appointment due:around 2/22/2025     Last fill:7/18/24  Refills 1 #90        :

## 2025-02-06 DIAGNOSIS — J30.9 CHRONIC ALLERGIC RHINITIS: ICD-10-CM

## 2025-02-06 DIAGNOSIS — R05.8 RECURRENT COUGH: ICD-10-CM

## 2025-02-06 RX ORDER — MONTELUKAST SODIUM 10 MG/1
TABLET ORAL
Qty: 90 TABLET | Refills: 1 | Status: SHIPPED | OUTPATIENT
Start: 2025-02-06

## 2025-02-09 DIAGNOSIS — I10 ESSENTIAL HYPERTENSION: ICD-10-CM

## 2025-02-09 DIAGNOSIS — I49.9 IRREGULAR HEART BEAT: ICD-10-CM

## 2025-02-09 DIAGNOSIS — I65.23 BILATERAL CAROTID ARTERY STENOSIS: ICD-10-CM

## 2025-02-10 RX ORDER — METOPROLOL SUCCINATE 50 MG/1
TABLET, EXTENDED RELEASE ORAL
Qty: 90 TABLET | Refills: 1 | Status: SHIPPED | OUTPATIENT
Start: 2025-02-10

## 2025-02-10 RX ORDER — ATORVASTATIN CALCIUM 20 MG/1
20 TABLET, FILM COATED ORAL DAILY
Qty: 90 TABLET | Refills: 1 | Status: SHIPPED | OUTPATIENT
Start: 2025-02-10

## 2025-02-24 RX ORDER — GLYCOPYRROLATE AND FORMOTEROL FUMARATE 9; 4.8 UG/1; UG/1
2 AEROSOL, METERED RESPIRATORY (INHALATION) 2 TIMES DAILY
Qty: 10.7 G | OUTPATIENT
Start: 2025-02-24

## 2025-02-24 RX ORDER — ALBUTEROL SULFATE 90 UG/1
2 INHALANT RESPIRATORY (INHALATION) EVERY 6 HOURS PRN
Qty: 18 G | Refills: 4 | Status: SHIPPED | OUTPATIENT
Start: 2025-02-24

## 2025-02-24 NOTE — TELEPHONE ENCOUNTER
Medication:   Requested Prescriptions     Pending Prescriptions Disp Refills    albuterol sulfate HFA (PROVENTIL;VENTOLIN;PROAIR) 108 (90 Base) MCG/ACT inhaler [Pharmacy Med Name: ALBUTEROL HFA 90 MCG INHALER] 18 g 4     Sig: INHALE 2 PUFFS BY MOUTH EVERY 6 HOURS AS NEEDED FOR WHEEZING        Last Filled:  1/15/24    Patient Phone Number: [phone] (home)     Last appt: 11/22/2024   Next appt: 2/25/2025    Last OARRS:       4/19/2017     4:01 PM   RX Monitoring   Attestation The Prescription Monitoring Report for this patient was reviewed today.

## 2025-02-25 ENCOUNTER — OFFICE VISIT (OUTPATIENT)
Dept: INTERNAL MEDICINE CLINIC | Age: 65
End: 2025-02-25
Payer: MEDICARE

## 2025-02-25 VITALS
SYSTOLIC BLOOD PRESSURE: 126 MMHG | DIASTOLIC BLOOD PRESSURE: 64 MMHG | OXYGEN SATURATION: 98 % | WEIGHT: 149.4 LBS | BODY MASS INDEX: 23.4 KG/M2 | HEART RATE: 56 BPM

## 2025-02-25 DIAGNOSIS — J30.2 SEASONAL ALLERGIC RHINITIS, UNSPECIFIED TRIGGER: ICD-10-CM

## 2025-02-25 DIAGNOSIS — Z78.0 POST-MENOPAUSAL: ICD-10-CM

## 2025-02-25 DIAGNOSIS — J44.9 COPD, MODERATE (HCC): ICD-10-CM

## 2025-02-25 DIAGNOSIS — I47.29 NSVT (NONSUSTAINED VENTRICULAR TACHYCARDIA) (HCC): ICD-10-CM

## 2025-02-25 DIAGNOSIS — J43.2 CENTRILOBULAR EMPHYSEMA (HCC): ICD-10-CM

## 2025-02-25 DIAGNOSIS — F10.21 ALCOHOL DEPENDENCE IN REMISSION (HCC): ICD-10-CM

## 2025-02-25 DIAGNOSIS — F41.9 ANXIETY: ICD-10-CM

## 2025-02-25 DIAGNOSIS — I10 ESSENTIAL HYPERTENSION: Primary | ICD-10-CM

## 2025-02-25 PROCEDURE — 1090F PRES/ABSN URINE INCON ASSESS: CPT | Performed by: INTERNAL MEDICINE

## 2025-02-25 PROCEDURE — 1036F TOBACCO NON-USER: CPT | Performed by: INTERNAL MEDICINE

## 2025-02-25 PROCEDURE — 3017F COLORECTAL CA SCREEN DOC REV: CPT | Performed by: INTERNAL MEDICINE

## 2025-02-25 PROCEDURE — G2211 COMPLEX E/M VISIT ADD ON: HCPCS | Performed by: INTERNAL MEDICINE

## 2025-02-25 PROCEDURE — 99214 OFFICE O/P EST MOD 30 MIN: CPT | Performed by: INTERNAL MEDICINE

## 2025-02-25 PROCEDURE — G8427 DOCREV CUR MEDS BY ELIG CLIN: HCPCS | Performed by: INTERNAL MEDICINE

## 2025-02-25 PROCEDURE — 3023F SPIROM DOC REV: CPT | Performed by: INTERNAL MEDICINE

## 2025-02-25 PROCEDURE — G8400 PT W/DXA NO RESULTS DOC: HCPCS | Performed by: INTERNAL MEDICINE

## 2025-02-25 PROCEDURE — 3074F SYST BP LT 130 MM HG: CPT | Performed by: INTERNAL MEDICINE

## 2025-02-25 PROCEDURE — 3078F DIAST BP <80 MM HG: CPT | Performed by: INTERNAL MEDICINE

## 2025-02-25 PROCEDURE — 1123F ACP DISCUSS/DSCN MKR DOCD: CPT | Performed by: INTERNAL MEDICINE

## 2025-02-25 PROCEDURE — G8420 CALC BMI NORM PARAMETERS: HCPCS | Performed by: INTERNAL MEDICINE

## 2025-02-25 RX ORDER — LORATADINE 10 MG/1
10 TABLET ORAL DAILY
Qty: 30 TABLET | Refills: 0 | Status: SHIPPED | OUTPATIENT
Start: 2025-02-25

## 2025-02-25 RX ORDER — BUSPIRONE HYDROCHLORIDE 7.5 MG/1
7.5 TABLET ORAL 2 TIMES DAILY
Qty: 180 TABLET | Refills: 1 | Status: SHIPPED | OUTPATIENT
Start: 2025-02-25

## 2025-02-25 SDOH — ECONOMIC STABILITY: FOOD INSECURITY: WITHIN THE PAST 12 MONTHS, THE FOOD YOU BOUGHT JUST DIDN'T LAST AND YOU DIDN'T HAVE MONEY TO GET MORE.: NEVER TRUE

## 2025-02-25 SDOH — ECONOMIC STABILITY: FOOD INSECURITY: WITHIN THE PAST 12 MONTHS, YOU WORRIED THAT YOUR FOOD WOULD RUN OUT BEFORE YOU GOT MONEY TO BUY MORE.: NEVER TRUE

## 2025-02-25 ASSESSMENT — PATIENT HEALTH QUESTIONNAIRE - PHQ9
SUM OF ALL RESPONSES TO PHQ QUESTIONS 1-9: 0
1. LITTLE INTEREST OR PLEASURE IN DOING THINGS: NOT AT ALL
SUM OF ALL RESPONSES TO PHQ QUESTIONS 1-9: 0
2. FEELING DOWN, DEPRESSED OR HOPELESS: NOT AT ALL
SUM OF ALL RESPONSES TO PHQ9 QUESTIONS 1 & 2: 0

## 2025-02-25 ASSESSMENT — ENCOUNTER SYMPTOMS
ABDOMINAL PAIN: 0
RHINORRHEA: 1
VOICE CHANGE: 0
TROUBLE SWALLOWING: 0
NAUSEA: 0
VOMITING: 0
SHORTNESS OF BREATH: 0
WHEEZING: 0
PHOTOPHOBIA: 0

## 2025-02-25 NOTE — PROGRESS NOTES
Saydagautam Dickens  1960  female  64 y.o.    SUBJECTIVE:    Chief Complaint   Patient presents with    3 Month Follow-Up     Has been experiencing some sneezing        HPI:  Follow-up visit for chronic problems.  Patient report higher dose of BuSpar helped tremendously however she is not getting refill from pharmacy as directed.  Requesting new prescription      Past Medical History:   Diagnosis Date    Alcohol problem drinking     Alcohol withdrawal delirium (HCC) 2017    Anxiety     Facial bones, closed fracture (HCC) 2017    Hypertension     Hyponatremia     Malignant hypertension 3/17/2016    Panic attacks      Past Surgical History:   Procedure Laterality Date    COLONOSCOPY  2018    next colonoscopy in , 10 years    COLONOSCOPY N/A 2018    COLONOSCOPY POLYPECTOMY SNARE/COLD BIOPSY performed by Jin Florian MD at [facility] ENDOSCOPY    NECK SURGERY      OTHER SURGICAL HISTORY      Right carotid artery surgery    TOTAL HIP ARTHROPLASTY Left 3/28/2023    LEFT TOTAL HIP REPLACEMENT-DIRECT ANTERIOR; MIRZA performed by Andrea Newsome MD at [facility] OR    TUBAL LIGATION      WRIST GANGLION EXCISION  [address] [place] [address]     Social History     Socioeconomic History    Marital status:      Spouse name: None    Number of children: 1    Years of education: None    Highest education level: None   Occupational History    Occupation: [employer]      Comment: 2017    Occupation: Previously a nurse's aide   Tobacco Use    Smoking status: Former     Current packs/day: 0.00     Average packs/day: 1 pack/day for 38.8 years (38.8 ttl pk-yrs)     Types: Cigarettes     Start date: 1979     Quit date: 10/18/2017     Years since quittin.3     Passive exposure: Past    Smokeless tobacco: Never   Vaping Use    Vaping status: Never Used   Substance and Sexual Activity    Alcohol use: Yes     Comment: rare    Drug use: Yes     Types: Marijuana (Weed)     Comment:

## 2025-02-27 RX ORDER — NAPROXEN 500 MG/1
500 TABLET ORAL DAILY
Qty: 90 TABLET | Refills: 1 | Status: SHIPPED | OUTPATIENT
Start: 2025-02-27

## 2025-02-27 NOTE — TELEPHONE ENCOUNTER
OKAY FOR FILL    Medication:   Requested Prescriptions     Pending Prescriptions Disp Refills    naproxen (NAPROSYN) 500 MG tablet 90 tablet 1     Sig: Take 1 tablet by mouth daily        Last Ordered: 4/24/24  Last Filled:  1/7/25    Patient Phone Number: [phone] (home)     Last appt: 2/25/2025   Next appt: 5/28/2025    Last OARRS:       4/19/2017     4:01 PM   RX Monitoring   Attestation The Prescription Monitoring Report for this patient was reviewed today.

## 2025-03-02 DIAGNOSIS — J43.2 CENTRILOBULAR EMPHYSEMA (HCC): Primary | ICD-10-CM

## 2025-03-02 DIAGNOSIS — J44.9 COPD, MODERATE (HCC): ICD-10-CM

## 2025-03-03 RX ORDER — GLYCOPYRROLATE AND FORMOTEROL FUMARATE 9; 4.8 UG/1; UG/1
2 AEROSOL, METERED RESPIRATORY (INHALATION) 2 TIMES DAILY
Qty: 10.7 G | Refills: 0 | Status: SHIPPED | OUTPATIENT
Start: 2025-03-03

## 2025-03-03 NOTE — TELEPHONE ENCOUNTER
Last appointment:  5/7/2024    Next appointment:  Visit date not found    S/W patient.  She said she finally has Medicare figured out and now can move forward.  Patient will have the CT lung done and then call for an OV.

## 2025-03-12 ENCOUNTER — HOSPITAL ENCOUNTER (OUTPATIENT)
Dept: CT IMAGING | Age: 65
Discharge: HOME | End: 2025-03-12
Attending: INTERNAL MEDICINE
Payer: MEDICARE

## 2025-03-12 DIAGNOSIS — Z87.891 PERSONAL HISTORY OF TOBACCO USE: ICD-10-CM

## 2025-03-12 PROCEDURE — 71271 CT THORAX LUNG CANCER SCR C-: CPT

## 2025-03-20 DIAGNOSIS — R91.1 PULMONARY NODULE: ICD-10-CM

## 2025-03-20 DIAGNOSIS — J43.2 CENTRILOBULAR EMPHYSEMA (HCC): Primary | ICD-10-CM

## 2025-03-20 DIAGNOSIS — J44.9 COPD, MODERATE (HCC): ICD-10-CM

## 2025-03-25 DIAGNOSIS — F41.9 ANXIETY: ICD-10-CM

## 2025-03-25 RX ORDER — ESCITALOPRAM OXALATE 20 MG/1
20 TABLET ORAL DAILY
Qty: 90 TABLET | Refills: 1 | Status: SHIPPED | OUTPATIENT
Start: 2025-03-25

## 2025-03-26 ENCOUNTER — HOSPITAL ENCOUNTER (OUTPATIENT)
Dept: PULMONOLOGY | Age: 65
Discharge: HOME | End: 2025-03-26
Attending: INTERNAL MEDICINE
Payer: MEDICARE

## 2025-03-26 DIAGNOSIS — R91.1 PULMONARY NODULE: ICD-10-CM

## 2025-03-26 DIAGNOSIS — J43.2 CENTRILOBULAR EMPHYSEMA (HCC): ICD-10-CM

## 2025-03-26 DIAGNOSIS — J44.9 COPD, MODERATE (HCC): ICD-10-CM

## 2025-03-26 LAB
DLCO %PRED: 84 %
DLCO PRED: NORMAL
DLCO/VA %PRED: NORMAL
DLCO/VA PRED: NORMAL
DLCO/VA: NORMAL
DLCO: NORMAL
EXPIRATORY TIME-POST: NORMAL
EXPIRATORY TIME: NORMAL
FEF 25-75 %CHNG: NORMAL
FEF 25-75 POST %PRED: NORMAL
FEF 25-75% %PRED-PRE: NORMAL
FEF 25-75% PRED: NORMAL
FEF 25-75-POST: NORMAL
FEF 25-75-PRE: NORMAL
FEV1 %PRED-POST: 79 %
FEV1 %PRED-PRE: 77 %
FEV1 PRED: NORMAL
FEV1-POST: NORMAL
FEV1-PRE: NORMAL
FEV1/FVC %PRED-POST: NORMAL
FEV1/FVC %PRED-PRE: NORMAL
FEV1/FVC PRED: NORMAL
FEV1/FVC-POST: 81 %
FEV1/FVC-PRE: 79 %
FVC %PRED-POST: NORMAL
FVC %PRED-PRE: NORMAL
FVC PRED: NORMAL
FVC-POST: NORMAL
FVC-PRE: NORMAL
GAW %PRED: NORMAL
GAW PRED: NORMAL
GAW: NORMAL
IC PRE %PRED: NORMAL
IC PRED: NORMAL
IC: NORMAL
MEP: NORMAL
MIP: NORMAL
MVV %PRED-PRE: NORMAL
MVV PRED: NORMAL
MVV-PRE: NORMAL
PEF %PRED-POST: NORMAL
PEF %PRED-PRE: NORMAL
PEF PRED: NORMAL
PEF%CHNG: NORMAL
PEF-POST: NORMAL
PEF-PRE: NORMAL
RAW %PRED: NORMAL
RAW PRED: NORMAL
RAW: NORMAL
RV PRE %PRED: NORMAL
RV PRED: NORMAL
RV: NORMAL
SVC %PRED: NORMAL
SVC PRED: NORMAL
SVC: NORMAL
TLC PRE %PRED: 175 %
TLC PRED: NORMAL
TLC: NORMAL
VA %PRED: NORMAL
VA PRED: NORMAL
VA: NORMAL
VTG %PRED: NORMAL
VTG PRED: NORMAL
VTG: NORMAL

## 2025-03-26 PROCEDURE — 94760 N-INVAS EAR/PLS OXIMETRY 1: CPT

## 2025-03-26 PROCEDURE — 94726 PLETHYSMOGRAPHY LUNG VOLUMES: CPT

## 2025-03-26 PROCEDURE — 94729 DIFFUSING CAPACITY: CPT

## 2025-03-26 PROCEDURE — 6370000000 HC RX 637 (ALT 250 FOR IP): Performed by: INTERNAL MEDICINE

## 2025-03-26 PROCEDURE — 94060 EVALUATION OF WHEEZING: CPT

## 2025-03-26 RX ORDER — ALBUTEROL SULFATE 90 UG/1
4 INHALANT RESPIRATORY (INHALATION) ONCE
Status: COMPLETED | OUTPATIENT
Start: 2025-03-26 | End: 2025-03-26

## 2025-03-26 RX ADMIN — Medication 4 PUFF: at 08:01

## 2025-03-26 ASSESSMENT — PULMONARY FUNCTION TESTS
FEV1_PERCENT_PREDICTED_PRE: 77
FEV1_PERCENT_PREDICTED_POST: 79
FEV1/FVC_PRE: 79
FEV1/FVC_POST: 81

## 2025-03-27 NOTE — PROCEDURES
Pulmonary Function Testing      Patient name:  Sayda Dickens      Unit #:   [id number]   Date of test:  3/26/2025   Date of interpretation:   3/27/2025    Ms. Sayda Dickens is a 64 y.o. year-old female. The spirometry data were acceptable and reproducible.     Spirometry:  Flow volume loops were obstructed. The FEV-1/FVC ratio was decreased. The pre-bronchodilator FEV-1 was 1.95 liters (-1.27 Z score), which was normal. The FVC was 3.12 liters (-2.31 Z score), which was normal. Response to inhaled bronchodilators (albuterol) was not significant.    Lung volumes:  Lung volumes were tested by plethysmography. The total lung capacity was 9.65 liters (7.73 Z score), which was increased. The residual volume was 6.26 liters (10.54 Z score), which was increased. The ratio of residual volume to total lung capacity (RV/TLC) was 4.28 Z score, which was increased.     Diffusion capacity was found to be -1.05 Z score which is normal.      Interpretation:  Mild obstructive lung disease with lung volume evidence for air trapping and hyperinflation    Comments:  Z score  Mild -1.645 to -2.5  Moderate -2.5 to -4.0  Severe: < -4.0     Using Z-scores can reduce age and height bias, and the recommended thresholds correlate with mortality risk.    Anibal Ashraf MD

## 2025-04-01 ENCOUNTER — OFFICE VISIT (OUTPATIENT)
Dept: PULMONOLOGY | Age: 65
End: 2025-04-01
Payer: MEDICARE

## 2025-04-01 VITALS
OXYGEN SATURATION: 99 % | BODY MASS INDEX: 23.86 KG/M2 | WEIGHT: 152 LBS | HEIGHT: 67 IN | DIASTOLIC BLOOD PRESSURE: 70 MMHG | SYSTOLIC BLOOD PRESSURE: 122 MMHG | HEART RATE: 82 BPM

## 2025-04-01 DIAGNOSIS — T17.500A MUCUS PLUGGING OF BRONCHI: Primary | ICD-10-CM

## 2025-04-01 DIAGNOSIS — J44.9 COPD, MODERATE (HCC): ICD-10-CM

## 2025-04-01 DIAGNOSIS — Z87.891 FORMER SMOKER: ICD-10-CM

## 2025-04-01 DIAGNOSIS — R59.0 MEDIASTINAL ADENOPATHY: ICD-10-CM

## 2025-04-01 DIAGNOSIS — J43.2 CENTRILOBULAR EMPHYSEMA (HCC): ICD-10-CM

## 2025-04-01 PROCEDURE — G8420 CALC BMI NORM PARAMETERS: HCPCS | Performed by: INTERNAL MEDICINE

## 2025-04-01 PROCEDURE — 1090F PRES/ABSN URINE INCON ASSESS: CPT | Performed by: INTERNAL MEDICINE

## 2025-04-01 PROCEDURE — 3023F SPIROM DOC REV: CPT | Performed by: INTERNAL MEDICINE

## 2025-04-01 PROCEDURE — 1036F TOBACCO NON-USER: CPT | Performed by: INTERNAL MEDICINE

## 2025-04-01 PROCEDURE — G8427 DOCREV CUR MEDS BY ELIG CLIN: HCPCS | Performed by: INTERNAL MEDICINE

## 2025-04-01 PROCEDURE — 3078F DIAST BP <80 MM HG: CPT | Performed by: INTERNAL MEDICINE

## 2025-04-01 PROCEDURE — G8400 PT W/DXA NO RESULTS DOC: HCPCS | Performed by: INTERNAL MEDICINE

## 2025-04-01 PROCEDURE — 1123F ACP DISCUSS/DSCN MKR DOCD: CPT | Performed by: INTERNAL MEDICINE

## 2025-04-01 PROCEDURE — 3074F SYST BP LT 130 MM HG: CPT | Performed by: INTERNAL MEDICINE

## 2025-04-01 PROCEDURE — 99214 OFFICE O/P EST MOD 30 MIN: CPT | Performed by: INTERNAL MEDICINE

## 2025-04-01 PROCEDURE — 3017F COLORECTAL CA SCREEN DOC REV: CPT | Performed by: INTERNAL MEDICINE

## 2025-04-01 RX ORDER — DOXYCYCLINE 100 MG/1
100 CAPSULE ORAL 2 TIMES DAILY
Qty: 20 CAPSULE | Refills: 0 | Status: SHIPPED | OUTPATIENT
Start: 2025-04-01 | End: 2025-04-11

## 2025-04-01 NOTE — PROGRESS NOTES
[facility]  Consult Note  Anibal Ashraf MD       Sayda Dickens   YOB: 1960    Date of Visit:  4/1/2025    Assessment/Plan:  1. Centrilobular emphysema (HCC) mucous plugging  I reviewed CT imaging with the patient for the visit today.    CT imaging reveals evidence of stable emphysema.  There is also evidence of mucous plugging in the right lower lobe and some mediastinal adenopathy.  This could be infectious in nature.    She is having cough and sputum.  She is also having sinus drainage with yellow-green mucus.    Give her a round of doxycycline.    2. COPD, moderate (HCC)    Pulmonary Function Testing       Patient name:  Sayda Dickens      Unit #:   [id number]   Date of test:  3/26/2025   Date of interpretation:   3/27/2025     Ms. Sayda Dickens is a 64 y.o. year-old female. The spirometry data were acceptable and reproducible.      Spirometry:  Flow volume loops were obstructed. The FEV-1/FVC ratio was decreased. The pre-bronchodilator FEV-1 was 1.95 liters (-1.27 Z score), which was normal. The FVC was 3.12 liters (-2.31 Z score), which was normal. Response to inhaled bronchodilators (albuterol) was not significant.     Lung volumes:  Lung volumes were tested by plethysmography. The total lung capacity was 9.65 liters (7.73 Z score), which was increased. The residual volume was 6.26 liters (10.54 Z score), which was increased. The ratio of residual volume to total lung capacity (RV/TLC) was 4.28 Z score, which was increased.      Diffusion capacity was found to be -1.05 Z score which is normal.       Interpretation:  Mild obstructive lung disease with lung volume evidence for air trapping and hyperinflation     Comments:  Z score  Mild -1.645 to -2.5  Moderate -2.5 to -4.0  Severe: < -4.0      Using Z-scores can reduce age and height bias, and the recommended thresholds correlate with mortality risk.     Plan:  Bevespi 2 puffs twice

## 2025-04-07 ENCOUNTER — HOSPITAL ENCOUNTER (OUTPATIENT)
Dept: GENERAL RADIOLOGY | Age: 65
Discharge: HOME | End: 2025-04-07
Attending: INTERNAL MEDICINE
Payer: MEDICARE

## 2025-04-07 ENCOUNTER — RESULTS FOLLOW-UP (OUTPATIENT)
Dept: INTERNAL MEDICINE CLINIC | Age: 65
End: 2025-04-07

## 2025-04-07 DIAGNOSIS — Z78.0 POST-MENOPAUSAL: ICD-10-CM

## 2025-04-07 DIAGNOSIS — M85.80 OSTEOPENIA, UNSPECIFIED LOCATION: Primary | ICD-10-CM

## 2025-04-07 PROCEDURE — 77080 DXA BONE DENSITY AXIAL: CPT

## 2025-04-07 RX ORDER — CAL/D3/MAG11/ZINC/COP/MANG/BOR 600 MG-800
1 TABLET ORAL DAILY
Qty: 90 TABLET | Refills: 3 | Status: SHIPPED | OUTPATIENT
Start: 2025-04-07

## 2025-04-07 NOTE — RESULT ENCOUNTER NOTE
Osteopenia but no osteoporosis.  I recommend to take Caltrate D ,1 tablet daily which is over-the-counter

## 2025-05-05 ENCOUNTER — OFFICE VISIT (OUTPATIENT)
Dept: INTERNAL MEDICINE CLINIC | Age: 65
End: 2025-05-05
Payer: MEDICARE

## 2025-05-05 VITALS
WEIGHT: 153 LBS | SYSTOLIC BLOOD PRESSURE: 130 MMHG | DIASTOLIC BLOOD PRESSURE: 68 MMHG | HEIGHT: 67 IN | BODY MASS INDEX: 24.01 KG/M2 | HEART RATE: 66 BPM

## 2025-05-05 DIAGNOSIS — I11.0 HYPERTENSIVE HEART FAILURE (HCC): ICD-10-CM

## 2025-05-05 DIAGNOSIS — I10 ESSENTIAL HYPERTENSION: Primary | ICD-10-CM

## 2025-05-05 DIAGNOSIS — M79.672 PAIN IN BOTH FEET: ICD-10-CM

## 2025-05-05 DIAGNOSIS — M79.671 PAIN IN BOTH FEET: ICD-10-CM

## 2025-05-05 PROCEDURE — G8427 DOCREV CUR MEDS BY ELIG CLIN: HCPCS | Performed by: INTERNAL MEDICINE

## 2025-05-05 PROCEDURE — 1123F ACP DISCUSS/DSCN MKR DOCD: CPT | Performed by: INTERNAL MEDICINE

## 2025-05-05 PROCEDURE — 99214 OFFICE O/P EST MOD 30 MIN: CPT | Performed by: INTERNAL MEDICINE

## 2025-05-05 PROCEDURE — G8420 CALC BMI NORM PARAMETERS: HCPCS | Performed by: INTERNAL MEDICINE

## 2025-05-05 PROCEDURE — 3017F COLORECTAL CA SCREEN DOC REV: CPT | Performed by: INTERNAL MEDICINE

## 2025-05-05 PROCEDURE — 3075F SYST BP GE 130 - 139MM HG: CPT | Performed by: INTERNAL MEDICINE

## 2025-05-05 PROCEDURE — G8399 PT W/DXA RESULTS DOCUMENT: HCPCS | Performed by: INTERNAL MEDICINE

## 2025-05-05 PROCEDURE — 1090F PRES/ABSN URINE INCON ASSESS: CPT | Performed by: INTERNAL MEDICINE

## 2025-05-05 PROCEDURE — 3078F DIAST BP <80 MM HG: CPT | Performed by: INTERNAL MEDICINE

## 2025-05-05 PROCEDURE — 1036F TOBACCO NON-USER: CPT | Performed by: INTERNAL MEDICINE

## 2025-05-05 RX ORDER — FUROSEMIDE 40 MG/1
40 TABLET ORAL DAILY
Qty: 7 TABLET | Refills: 0 | Status: SHIPPED | OUTPATIENT
Start: 2025-05-05 | End: 2025-05-12

## 2025-05-05 ASSESSMENT — ENCOUNTER SYMPTOMS
WHEEZING: 0
ABDOMINAL PAIN: 0
NAUSEA: 0
SHORTNESS OF BREATH: 0
VOMITING: 0
CHEST TIGHTNESS: 0

## 2025-05-07 RX ORDER — FOLIC ACID 1 MG/1
1000 TABLET ORAL DAILY
Qty: 90 TABLET | Refills: 1 | Status: SHIPPED | OUTPATIENT
Start: 2025-05-07

## 2025-05-09 DIAGNOSIS — M79.671 PAIN IN BOTH FEET: ICD-10-CM

## 2025-05-09 DIAGNOSIS — I10 ESSENTIAL HYPERTENSION: ICD-10-CM

## 2025-05-09 DIAGNOSIS — I11.0 HYPERTENSIVE HEART FAILURE (HCC): ICD-10-CM

## 2025-05-09 DIAGNOSIS — M79.672 PAIN IN BOTH FEET: ICD-10-CM

## 2025-05-09 LAB
ANION GAP SERPL CALCULATED.3IONS-SCNC: 12 MMOL/L (ref 3–16)
BUN SERPL-MCNC: 12 MG/DL (ref 7–20)
CALCIUM SERPL-MCNC: 9.5 MG/DL (ref 8.3–10.6)
CHLORIDE SERPL-SCNC: 95 MMOL/L (ref 99–110)
CO2 SERPL-SCNC: 26 MMOL/L (ref 21–32)
CREAT SERPL-MCNC: 0.7 MG/DL (ref 0.6–1.2)
GFR SERPLBLD CREATININE-BSD FMLA CKD-EPI: >90 ML/MIN/{1.73_M2}
GLUCOSE SERPL-MCNC: 95 MG/DL (ref 70–99)
NT-PROBNP SERPL-MCNC: 1181 PG/ML (ref 0–124)
POTASSIUM SERPL-SCNC: 5 MMOL/L (ref 3.5–5.1)
SODIUM SERPL-SCNC: 133 MMOL/L (ref 136–145)
URATE SERPL-MCNC: 5 MG/DL (ref 2.6–6)

## 2025-05-11 ENCOUNTER — RESULTS FOLLOW-UP (OUTPATIENT)
Dept: INTERNAL MEDICINE CLINIC | Age: 65
End: 2025-05-11

## 2025-05-11 NOTE — RESULT ENCOUNTER NOTE
Heart failure marker is elevated.  She was placed on Lasix for a week.  Make sure she make a follow-up appointment with her cardiologist

## 2025-05-13 DIAGNOSIS — J44.9 COPD, MODERATE (HCC): ICD-10-CM

## 2025-05-13 DIAGNOSIS — J43.2 CENTRILOBULAR EMPHYSEMA (HCC): ICD-10-CM

## 2025-05-13 RX ORDER — GLYCOPYRROLATE AND FORMOTEROL FUMARATE 9; 4.8 UG/1; UG/1
2 AEROSOL, METERED RESPIRATORY (INHALATION) 2 TIMES DAILY
Qty: 10.7 G | Refills: 3 | Status: SHIPPED | OUTPATIENT
Start: 2025-05-13

## 2025-05-28 ENCOUNTER — TELEPHONE (OUTPATIENT)
Dept: INTERNAL MEDICINE CLINIC | Age: 65
End: 2025-05-28

## 2025-05-28 ENCOUNTER — OFFICE VISIT (OUTPATIENT)
Dept: INTERNAL MEDICINE CLINIC | Age: 65
End: 2025-05-28

## 2025-05-28 VITALS
DIASTOLIC BLOOD PRESSURE: 70 MMHG | OXYGEN SATURATION: 99 % | BODY MASS INDEX: 24.18 KG/M2 | WEIGHT: 154.4 LBS | HEART RATE: 68 BPM | SYSTOLIC BLOOD PRESSURE: 126 MMHG

## 2025-05-28 VITALS
WEIGHT: 154.4 LBS | HEART RATE: 68 BPM | BODY MASS INDEX: 24.23 KG/M2 | SYSTOLIC BLOOD PRESSURE: 126 MMHG | OXYGEN SATURATION: 99 % | HEIGHT: 67 IN | DIASTOLIC BLOOD PRESSURE: 70 MMHG

## 2025-05-28 DIAGNOSIS — I11.0 HYPERTENSIVE HEART FAILURE (HCC): Primary | ICD-10-CM

## 2025-05-28 DIAGNOSIS — Z11.4 SCREENING FOR HIV WITHOUT PRESENCE OF RISK FACTORS: ICD-10-CM

## 2025-05-28 DIAGNOSIS — K21.9 GASTROESOPHAGEAL REFLUX DISEASE WITHOUT ESOPHAGITIS: ICD-10-CM

## 2025-05-28 DIAGNOSIS — M62.838 NECK MUSCLE SPASM: ICD-10-CM

## 2025-05-28 DIAGNOSIS — M62.830 BACK SPASM: ICD-10-CM

## 2025-05-28 DIAGNOSIS — Z00.00 WELCOME TO MEDICARE PREVENTIVE VISIT: Primary | ICD-10-CM

## 2025-05-28 DIAGNOSIS — Z98.890 S/P CAROTID ENDARTERECTOMY: ICD-10-CM

## 2025-05-28 RX ORDER — FUROSEMIDE 20 MG/1
20 TABLET ORAL
Qty: 45 TABLET | Refills: 3 | Status: SHIPPED | OUTPATIENT
Start: 2025-05-28

## 2025-05-28 RX ORDER — ASPIRIN 81 MG/1
81 TABLET ORAL DAILY
Qty: 90 TABLET | Refills: 3 | Status: SHIPPED | OUTPATIENT
Start: 2025-05-28

## 2025-05-28 ASSESSMENT — ENCOUNTER SYMPTOMS
ABDOMINAL PAIN: 0
WHEEZING: 0
VOICE CHANGE: 0
TROUBLE SWALLOWING: 0
NAUSEA: 0
VOMITING: 0
CHEST TIGHTNESS: 0
SHORTNESS OF BREATH: 0

## 2025-05-28 ASSESSMENT — LIFESTYLE VARIABLES
HOW OFTEN DO YOU HAVE A DRINK CONTAINING ALCOHOL: 2-4 TIMES A MONTH
HOW MANY STANDARD DRINKS CONTAINING ALCOHOL DO YOU HAVE ON A TYPICAL DAY: 1 OR 2

## 2025-05-28 ASSESSMENT — PATIENT HEALTH QUESTIONNAIRE - PHQ9
2. FEELING DOWN, DEPRESSED OR HOPELESS: NOT AT ALL
SUM OF ALL RESPONSES TO PHQ QUESTIONS 1-9: 0
1. LITTLE INTEREST OR PLEASURE IN DOING THINGS: NOT AT ALL
SUM OF ALL RESPONSES TO PHQ QUESTIONS 1-9: 0

## 2025-05-28 NOTE — TELEPHONE ENCOUNTER
Called Dr. Menendez office and spoke with Dayana who stated that Dr. Menendez is very well accepting new patients and patient could have possibly received a letter that went out about another doctor will not be doing certain things, but Dr. Menendez is available. Will inform patient at her appointment today.

## 2025-05-28 NOTE — PROGRESS NOTES
Sayda Dickens  1960  female  64 y.o.    SUBJECTIVE:    Chief Complaint   Patient presents with    3 Month Follow-Up       History of Present Illness  The patient presents for evaluation of leg swelling, back pain, and medication management.    She reports a significant improvement in her condition following the administration of Lasix, with no current complaints of foot pain. However, prolonged standing at work, approximately 4.5 hours, results in mild foot swelling, which subsides after a period of rest and elevation at home.  Patient denies chest pain palpitation dizziness shortness of breath or orthostatic symptoms.    She continues to take tizanidine for muscle relaxation and reports experiencing lower back pain, described as a stabbing sensation, particularly after her 4-hour work shift. She has a history of anterior fusion surgery.    She has been advised to schedule an appointment with Dr. Estrada and has received clarification regarding Dr. Menendez from the nursing staff. She has previously consulted with Dr. Jackson on several occasions. She has been prescribed nifedipine and has an existing order for a carotid check but is currently on Medicare and is concerned about the out-of-pocket cost of $390.      She is currently taking BuSpar and escitalopram and reports no hallucinations, delusions, or manic symptoms. Her use of omeprazole is contingent on her diet, with prophylactic administration prior to consuming Mexican food.     She was previously on Naprosyn but discontinued its use and resumed daily aspirin 81 mg as per medical advice.             Past Medical History:   Diagnosis Date    Alcohol problem drinking     Alcohol withdrawal delirium (HCC) 2/22/2017    Anxiety     Facial bones, closed fracture (HCC) 2/22/2017    Hypertension     Hyponatremia     Malignant hypertension 3/17/2016    Panic attacks      Past Surgical History:   Procedure Laterality Date    COLONOSCOPY  11/16/2018    next

## 2025-05-28 NOTE — PROGRESS NOTES
Medicare Annual Wellness Visit    Sayda Dickens is here for Medicare AWV    Assessment & Plan   Welcome to Medicare preventive visit  Screening for HIV without presence of risk factors  -     HIV Screen; Future       Return in 1 year (on 5/28/2026) for Medicare AWV.     Subjective   Discuss with patient the importance of the recommended CARE  GAP of: COVID-19 vaccine. Patient states that she will get the COVID-19 vaccine in the fall.     Patient's complete Health Risk Assessment and screening values have been reviewed and are found in Flowsheets. The following problems were reviewed today and where indicated follow up appointments were made and/or referrals ordered.    Positive Risk Factor Screenings with Interventions:        Drug Use:   Substance and Sexual Activity   Drug Use Yes    Types: Marijuana (Weed)    Comment: couple of puffs--couple of days ago.     Interventions:  See AVS for additional education material        General HRA Questions:  Select all that apply: (!) Loneliness  Interventions - Loneliness:  See AVS for additional education material         Dentist Screen:  Have you seen the dentist within the past year?: (!) No    Intervention:  See AVS for additional education material    Hearing Screen:  Do you or your family notice any trouble with your hearing that hasn't been managed with hearing aids?: (!) Yes (Left ear hearing loss)    Interventions:  See AVS for additional education material     Safety:  Do you have non-slip mats or non-slip surfaces or shower bars or grab bars in your shower or bathtub?: (!) No  Interventions:  See AVS for additional education material     Advanced Directives:  Do you have a Living Will?: (!) No (Information packet given)    Intervention:  has NO advanced directive - information provided           Objective   Vitals:    05/28/25 0922   BP: 126/70   Pulse: 68   SpO2: 99%   Weight: 70 kg (154 lb 6.4 oz)   Height: 1.702 m (5' 7.01\")      Body mass index is 24.18

## 2025-05-28 NOTE — PATIENT INSTRUCTIONS
seafood, lean meats and poultry, eggs, beans, peas, nuts, seeds, and soy products.     Limit drinks and foods with added sugar. These include candy, desserts, and soda pop.   Heart-healthy lifestyle    If your doctor recommends it, get more exercise. For many people, walking is a good choice. Or you may want to swim, bike, or do other activities. Bit by bit, increase the time you're active every day. Try for at least 30 minutes on most days of the week.     Try to quit or cut back on using tobacco and other nicotine products. This includes smoking and vaping. If you need help quitting, talk to your doctor about stop-smoking programs and medicines. These can increase your chances of quitting for good. Quitting is one of the most important things you can do to protect your heart. It is never too late to quit. Try to avoid secondhand smoke too.     Stay at a weight that's healthy for you. Talk to your doctor if you need help losing weight.     Try to get 7 to 9 hours of sleep each night.     Limit alcohol to 2 drinks a day for men and 1 drink a day for women. Too much alcohol can cause health problems.     Manage other health problems such as diabetes, high blood pressure, and high cholesterol. If you think you may have a problem with alcohol or drug use, talk to your doctor.   Medicines    Take your medicines exactly as prescribed. Call your doctor if you think you are having a problem with your medicine.     If your doctor recommends aspirin, take the amount directed each day. Make sure you take aspirin and not another kind of pain reliever, such as acetaminophen (Tylenol).   When should you call for help?   Call 911 if you have symptoms of a heart attack. These may include:    Chest pain or pressure, or a strange feeling in the chest.     Sweating.     Shortness of breath.     Pain, pressure, or a strange feeling in the back, neck, jaw, or upper belly or in one or both shoulders or arms.     Lightheadedness or

## 2025-06-06 ENCOUNTER — TELEPHONE (OUTPATIENT)
Dept: VASCULAR SURGERY | Age: 65
End: 2025-06-06

## 2025-06-06 NOTE — TELEPHONE ENCOUNTER
I did speak with the patient to inform her that we do have a referral from Dr. Salvador, she is aware and she is very interested in scheduling with Dr. Ott for a treat and eval. Patient stated she was driving and will call back when she gets to work to discuss more.    Thank you,  Paulo

## 2025-06-08 NOTE — PROGRESS NOTES
[facility]     Outpatient Follow Up Note    Sayda Dickens is 64 y.o. female who presents today with a history of PVCs HTN, chronic hyponatremia, alcohol withdrawal.     Last year, a Holter showed 14% PVCs so I started her on beta blocker. LVEF was normal on echo. Today she had a new echo to re-evaluate LVEF given that PVCs can cause a cardiomyopathy.       CHIEF COMPLAINT / HPI:  Follow Up secondary to irregular heartbeat       History of Present Illness  The patient presents for evaluation of tachycardia, shortness of breath, and chest pain.    She initially sought medical attention due to swelling and discomfort in her feet. She consulted with Dr. Salvador, who conducted laboratory tests revealing elevated uric acid levels. She was advised to consult a cardiologist, but her insurance transition from Medicaid to Medicare caused a delay. The swelling in her feet has since resolved, although she notes that her socks leave marks on her ankles. She reports no current cardiac issues.    She has been prescribed Lasix 20 mg daily for 5 days, which effectively reduced the swelling in her legs. Subsequently, the dosage was adjusted to 40 mg every other day for a week, with instructions to report back via [employer]. It has been approximately 4 days since this adjustment.    She is scheduled for a carotid Doppler at the beginning of next month. She has been seeing Dr. Salvador every 3 to 6 months for her sodium level problems and uric acid level problems and has requested to see him once a year unless she is sick.    Today, she denies significant chest pain. There is no SOB/BURRIS. The patient denies orthopnea/PND. Denies swelling. her weight is 152 lbs.   The patient is not experiencing palpitations or dizziness.     These symptoms are improving since the last Office Visit.  With regard to medication therapy the patient has been compliant with prescribed regimen. They have tolerated therapy to date.     SOCIAL

## 2025-06-09 ENCOUNTER — OFFICE VISIT (OUTPATIENT)
Dept: CARDIOLOGY CLINIC | Age: 65
End: 2025-06-09
Payer: MEDICARE

## 2025-06-09 VITALS
BODY MASS INDEX: 23.86 KG/M2 | DIASTOLIC BLOOD PRESSURE: 78 MMHG | OXYGEN SATURATION: 95 % | SYSTOLIC BLOOD PRESSURE: 136 MMHG | HEART RATE: 96 BPM | WEIGHT: 152.4 LBS

## 2025-06-09 DIAGNOSIS — E78.49 OTHER HYPERLIPIDEMIA: ICD-10-CM

## 2025-06-09 DIAGNOSIS — I49.3 VENTRICULAR PREMATURE DEPOLARIZATION: ICD-10-CM

## 2025-06-09 DIAGNOSIS — I47.29 NSVT (NONSUSTAINED VENTRICULAR TACHYCARDIA) (HCC): Primary | ICD-10-CM

## 2025-06-09 DIAGNOSIS — I10 ESSENTIAL HYPERTENSION: ICD-10-CM

## 2025-06-09 DIAGNOSIS — I34.0 NONRHEUMATIC MITRAL VALVE REGURGITATION: ICD-10-CM

## 2025-06-09 DIAGNOSIS — R01.1 HEART MURMUR: ICD-10-CM

## 2025-06-09 DIAGNOSIS — I35.8 AORTIC VALVE SCLEROSIS: ICD-10-CM

## 2025-06-09 PROCEDURE — G8399 PT W/DXA RESULTS DOCUMENT: HCPCS

## 2025-06-09 PROCEDURE — 1123F ACP DISCUSS/DSCN MKR DOCD: CPT

## 2025-06-09 PROCEDURE — 3075F SYST BP GE 130 - 139MM HG: CPT

## 2025-06-09 PROCEDURE — 99214 OFFICE O/P EST MOD 30 MIN: CPT

## 2025-06-09 PROCEDURE — 1090F PRES/ABSN URINE INCON ASSESS: CPT

## 2025-06-09 PROCEDURE — 93000 ELECTROCARDIOGRAM COMPLETE: CPT

## 2025-06-09 PROCEDURE — 3017F COLORECTAL CA SCREEN DOC REV: CPT

## 2025-06-09 PROCEDURE — 3078F DIAST BP <80 MM HG: CPT

## 2025-06-09 PROCEDURE — G8420 CALC BMI NORM PARAMETERS: HCPCS

## 2025-06-09 PROCEDURE — G8427 DOCREV CUR MEDS BY ELIG CLIN: HCPCS

## 2025-06-09 PROCEDURE — 1036F TOBACCO NON-USER: CPT

## 2025-06-09 NOTE — PATIENT INSTRUCTIONS
Repeat Holter monitor when able  Repeat echocardiogram when able  Follow up with dr. Estrada in March   Continue swelling in legs

## 2025-06-24 DIAGNOSIS — M62.830 BACK SPASM: ICD-10-CM

## 2025-06-24 DIAGNOSIS — M62.838 NECK MUSCLE SPASM: ICD-10-CM

## 2025-06-24 RX ORDER — TIZANIDINE 2 MG/1
TABLET ORAL
Qty: 90 TABLET | Refills: 1 | Status: SHIPPED | OUTPATIENT
Start: 2025-06-24

## 2025-07-02 ENCOUNTER — OFFICE VISIT (OUTPATIENT)
Dept: VASCULAR SURGERY | Age: 65
End: 2025-07-02

## 2025-07-02 VITALS
SYSTOLIC BLOOD PRESSURE: 116 MMHG | HEIGHT: 67 IN | HEART RATE: 59 BPM | OXYGEN SATURATION: 96 % | DIASTOLIC BLOOD PRESSURE: 70 MMHG | WEIGHT: 151 LBS | BODY MASS INDEX: 23.7 KG/M2

## 2025-07-02 DIAGNOSIS — I65.23 CAROTID ARTERY STENOSIS WITHOUT CEREBRAL INFARCTION, BILATERAL: Primary | ICD-10-CM

## 2025-07-02 RX ORDER — KETOCONAZOLE 20 MG/G
CREAM TOPICAL
COMMUNITY
Start: 2024-11-22

## 2025-07-02 RX ORDER — CICLOPIROX 80 MG/ML
SOLUTION TOPICAL
COMMUNITY

## 2025-07-02 NOTE — PROGRESS NOTES
CONTRAST    (Results Pending)     No results found.    Assessment:   Carotid artery stenosis, bilateral      Plan:  Will obtain a CTA head and neck for evaluation of carotid artery disease given duplex concern for disease progression.  Continue antiplatelet therapy follow-up after CTA has been obtained    Electronically signed by Lamont Ott DO on 7/2/2025 at 11:50 AM

## 2025-07-28 DIAGNOSIS — I73.00 RAYNAUD'S PHENOMENON WITHOUT GANGRENE: ICD-10-CM

## 2025-07-28 DIAGNOSIS — I10 ESSENTIAL HYPERTENSION: ICD-10-CM

## 2025-07-28 RX ORDER — NIFEDIPINE 30 MG/1
30 TABLET, EXTENDED RELEASE ORAL NIGHTLY
Qty: 90 TABLET | Refills: 1 | Status: SHIPPED | OUTPATIENT
Start: 2025-07-28

## 2025-08-05 DIAGNOSIS — I49.9 IRREGULAR HEART BEAT: ICD-10-CM

## 2025-08-05 DIAGNOSIS — I10 ESSENTIAL HYPERTENSION: ICD-10-CM

## 2025-08-05 DIAGNOSIS — R05.8 RECURRENT COUGH: ICD-10-CM

## 2025-08-05 DIAGNOSIS — J30.9 CHRONIC ALLERGIC RHINITIS: ICD-10-CM

## 2025-08-05 RX ORDER — MONTELUKAST SODIUM 10 MG/1
10 TABLET ORAL DAILY
Qty: 90 TABLET | Refills: 1 | Status: SHIPPED | OUTPATIENT
Start: 2025-08-05

## 2025-08-05 RX ORDER — METOPROLOL SUCCINATE 50 MG/1
50 TABLET, EXTENDED RELEASE ORAL DAILY
Qty: 90 TABLET | Refills: 1 | Status: SHIPPED | OUTPATIENT
Start: 2025-08-05

## 2025-08-06 ENCOUNTER — APPOINTMENT (OUTPATIENT)
Dept: URBAN - METROPOLITAN AREA CLINIC 170 | Facility: CLINIC | Age: 65
Setting detail: DERMATOLOGY
End: 2025-08-06

## 2025-08-06 DIAGNOSIS — L82.1 OTHER SEBORRHEIC KERATOSIS: ICD-10-CM | Status: STABLE

## 2025-08-06 DIAGNOSIS — D22 MELANOCYTIC NEVI: ICD-10-CM | Status: STABLE

## 2025-08-06 DIAGNOSIS — L57.0 ACTINIC KERATOSIS: ICD-10-CM | Status: INADEQUATELY CONTROLLED

## 2025-08-06 DIAGNOSIS — Z85.828 PERSONAL HISTORY OF OTHER MALIGNANT NEOPLASM OF SKIN: ICD-10-CM | Status: STABLE

## 2025-08-06 DIAGNOSIS — L81.4 OTHER MELANIN HYPERPIGMENTATION: ICD-10-CM | Status: STABLE

## 2025-08-06 DIAGNOSIS — D18.0 HEMANGIOMA: ICD-10-CM | Status: STABLE

## 2025-08-06 DIAGNOSIS — B35.1 TINEA UNGUIUM: ICD-10-CM | Status: IMPROVED

## 2025-08-06 PROBLEM — D22.5 MELANOCYTIC NEVI OF TRUNK: Status: ACTIVE | Noted: 2025-08-06

## 2025-08-06 PROBLEM — D18.01 HEMANGIOMA OF SKIN AND SUBCUTANEOUS TISSUE: Status: ACTIVE | Noted: 2025-08-06

## 2025-08-06 PROCEDURE — ? LIQUID NITROGEN

## 2025-08-06 PROCEDURE — ? PRESCRIPTION MEDICATION MANAGEMENT

## 2025-08-06 PROCEDURE — ? MEDICATION COUNSELING

## 2025-08-06 PROCEDURE — ? COUNSELING

## 2025-08-06 PROCEDURE — ? FULL BODY SKIN EXAM

## 2025-08-06 PROCEDURE — ? TREATMENT REGIMEN

## 2025-08-06 PROCEDURE — ? PHOTO-DOCUMENTATION

## 2025-08-06 PROCEDURE — ? PRESCRIPTION

## 2025-08-06 RX ORDER — CICLOPIROX 80 MG/ML
SOLUTION TOPICAL
Qty: 6.6 | Refills: 11 | Status: ERX

## 2025-08-06 ASSESSMENT — LOCATION DETAILED DESCRIPTION DERM
LOCATION DETAILED: LEFT MEDIAL FOREHEAD
LOCATION DETAILED: RIGHT ANTERIOR SHOULDER
LOCATION DETAILED: LEFT INFERIOR FOREHEAD
LOCATION DETAILED: RIGHT GREAT TOENAIL
LOCATION DETAILED: SUPERIOR THORACIC SPINE
LOCATION DETAILED: PERIUMBILICAL SKIN
LOCATION DETAILED: LEFT INFERIOR UPPER BACK
LOCATION DETAILED: LEFT GREAT TOENAIL
LOCATION DETAILED: LEFT FOREHEAD
LOCATION DETAILED: LEFT INFERIOR VERMILION LIP
LOCATION DETAILED: LEFT CENTRAL TEMPLE

## 2025-08-06 ASSESSMENT — LOCATION ZONE DERM
LOCATION ZONE: FACE
LOCATION ZONE: ARM
LOCATION ZONE: TOENAIL
LOCATION ZONE: TRUNK
LOCATION ZONE: LIP

## 2025-08-06 ASSESSMENT — LOCATION SIMPLE DESCRIPTION DERM
LOCATION SIMPLE: LEFT FOREHEAD
LOCATION SIMPLE: LEFT GREAT TOE
LOCATION SIMPLE: LEFT UPPER BACK
LOCATION SIMPLE: LEFT LIP
LOCATION SIMPLE: UPPER BACK
LOCATION SIMPLE: LEFT TEMPLE
LOCATION SIMPLE: ABDOMEN
LOCATION SIMPLE: RIGHT SHOULDER
LOCATION SIMPLE: RIGHT GREAT TOE

## 2025-08-14 DIAGNOSIS — I65.23 BILATERAL CAROTID ARTERY STENOSIS: ICD-10-CM

## 2025-08-14 RX ORDER — ATORVASTATIN CALCIUM 20 MG/1
20 TABLET, FILM COATED ORAL DAILY
Qty: 90 TABLET | Refills: 1 | Status: SHIPPED | OUTPATIENT
Start: 2025-08-14

## 2025-08-26 ENCOUNTER — OFFICE VISIT (OUTPATIENT)
Dept: ORTHOPEDIC SURGERY | Age: 65
End: 2025-08-26

## 2025-08-26 VITALS — HEIGHT: 67 IN | WEIGHT: 151 LBS | BODY MASS INDEX: 23.7 KG/M2

## 2025-08-26 DIAGNOSIS — M79.641 PAIN OF RIGHT HAND: ICD-10-CM

## 2025-08-26 DIAGNOSIS — S62.521A DISPLACED FRACTURE OF DISTAL PHALANX OF RIGHT THUMB, INITIAL ENCOUNTER FOR CLOSED FRACTURE: Primary | ICD-10-CM

## (undated) DEVICE — DRESSING WND 4X8 IN ANTIMICROBIAL CONTACT SYS THERABOND 3D

## (undated) DEVICE — YANKAUER,OPEN TIP,W/O VENT,STERILE: Brand: MEDLINE INDUSTRIES, INC.

## (undated) DEVICE — APPLICATOR MEDICATED 26 CC SOLUTION HI LT ORNG CHLORAPREP

## (undated) DEVICE — SOLUTION IV IRRIG WATER 500ML POUR BRL ST 2F7113

## (undated) DEVICE — HOOD, PEEL-AWAY: Brand: FLYTE

## (undated) DEVICE — SUTURE ETHBND EXCEL SZ 5 L30IN NONABSORBABLE GRN L48MM V-40 MB46G

## (undated) DEVICE — BIPOLAR SEALER 23-112-1 AQM 6.0: Brand: AQUAMANTYS ®

## (undated) DEVICE — SUTURE VCRL + SZ 1 L36IN ABSRB UD L36MM CT-1 1/2 CIR VCP947H

## (undated) DEVICE — PROCEDURE KIT ENDOSCP CUST

## (undated) DEVICE — SYSTEM SKIN CLSR 22CM DERMBND PRINEO

## (undated) DEVICE — HANDPIECE SET WITH HIGH FLOW TIP AND SUCTION TUBE: Brand: INTERPULSE

## (undated) DEVICE — ELECTRODE PT RET AD L9FT HI MOIST COND ADH HYDRGEL CORDED

## (undated) DEVICE — HYPODERMIC SAFETY NEEDLE: Brand: MAGELLAN

## (undated) DEVICE — SUTURE VCRL SZ 2-0 L36IN ABSRB UD L36MM CT-1 1/2 CIR J945H

## (undated) DEVICE — Device: Brand: DISPOSABLE ELECTROSURGICAL SNARE

## (undated) DEVICE — C-ARM: Brand: UNBRANDED

## (undated) DEVICE — PACK SURG HIP ASSESSORY

## (undated) DEVICE — STOCKING,ANTI-EMBOLISM,T-L,M REG,LF: Brand: MEDLINE

## (undated) DEVICE — TOTAL BASIC PK

## (undated) DEVICE — HOOD: Brand: FLYTE

## (undated) DEVICE — DECANTER FLD 9IN ST BG FOR ASEP TRNSF OF FLD

## (undated) DEVICE — MARKER,SKIN,WI/RULER AND LABELS: Brand: MEDLINE

## (undated) DEVICE — MERCY FAIRFIELD TURNOVER KIT: Brand: MEDLINE INDUSTRIES, INC.

## (undated) DEVICE — SUTURE MCRYL + SZ 4-0 L27IN ABSRB UD L19MM PS-2 3/8 CIR MCP426H

## (undated) DEVICE — SUTURE STRATAFIX SZ 1 L14IN ABSRB VLT L36MM MO-4 TAPERPOINT SXPD2B400

## (undated) DEVICE — SYRINGE MED 30ML STD CLR PLAS LUERLOCK TIP N CTRL DISP

## (undated) DEVICE — CONTAINER,SPECIMEN,OR STERILE,4OZ: Brand: MEDLINE

## (undated) DEVICE — BW-412T DISP COMBO CLEANING BRUSH: Brand: SINGLE USE COMBINATION CLEANING BRUSH

## (undated) DEVICE — SUTURE VCRL SZ 0 L36IN ABSRB UD CT-1 L36MM 1/2 CIR TAPR PNT VCP946H

## (undated) DEVICE — RESTRAINT EXT ANK WRST LT BLU FOAM 2 STRP SIDE BCKL HK AND

## (undated) DEVICE — TRAP SPEC RETRV CLR PLAS POLYP IN LN SUCT QUIK CTCH